# Patient Record
Sex: FEMALE | Race: BLACK OR AFRICAN AMERICAN | Employment: PART TIME | URBAN - METROPOLITAN AREA
[De-identification: names, ages, dates, MRNs, and addresses within clinical notes are randomized per-mention and may not be internally consistent; named-entity substitution may affect disease eponyms.]

---

## 2018-05-02 ENCOUNTER — HOSPITAL ENCOUNTER (EMERGENCY)
Age: 23
Discharge: HOME | End: 2018-05-02
Attending: EMERGENCY MEDICINE
Payer: COMMERCIAL

## 2018-05-02 VITALS
OXYGEN SATURATION: 100 % | RESPIRATION RATE: 16 BRPM | HEART RATE: 90 BPM | SYSTOLIC BLOOD PRESSURE: 114 MMHG | DIASTOLIC BLOOD PRESSURE: 67 MMHG | TEMPERATURE: 98 F

## 2018-05-02 DIAGNOSIS — J45.20 MILD INTERMITTENT ASTHMA WITHOUT COMPLICATION: Primary | ICD-10-CM

## 2018-05-02 LAB
APPEARANCE UR: CLEAR
BACTERIA URNS QL MICRO: ABNORMAL /HPF
BILIRUB UR QL: NEGATIVE
COLOR UR: YELLOW
EPITH CASTS URNS QL MICRO: ABNORMAL /LPF (ref 0–5)
GLUCOSE UR STRIP.AUTO-MCNC: NEGATIVE MG/DL
HGB UR QL STRIP: ABNORMAL
KETONES UR QL STRIP.AUTO: NEGATIVE MG/DL
LEUKOCYTE ESTERASE UR QL STRIP.AUTO: NEGATIVE
MUCOUS THREADS URNS QL MICRO: ABNORMAL /LPF
NITRITE UR QL STRIP.AUTO: NEGATIVE
PH UR STRIP: 6 [PH] (ref 5–8)
PROT UR STRIP-MCNC: 100 MG/DL
RBC #/AREA URNS HPF: ABNORMAL /HPF (ref 0–5)
SP GR UR REFRACTOMETRY: 1.02 (ref 1–1.03)
UROBILINOGEN UR QL STRIP.AUTO: 0.2 EU/DL (ref 0.2–1)
WBC URNS QL MICRO: ABNORMAL /HPF (ref 0–4)

## 2018-05-02 PROCEDURE — 81001 URINALYSIS AUTO W/SCOPE: CPT | Performed by: EMERGENCY MEDICINE

## 2018-05-02 PROCEDURE — 74011250637 HC RX REV CODE- 250/637: Performed by: EMERGENCY MEDICINE

## 2018-05-02 PROCEDURE — 96372 THER/PROPH/DIAG INJ SC/IM: CPT

## 2018-05-02 PROCEDURE — 87491 CHLMYD TRACH DNA AMP PROBE: CPT | Performed by: EMERGENCY MEDICINE

## 2018-05-02 PROCEDURE — 99283 EMERGENCY DEPT VISIT LOW MDM: CPT

## 2018-05-02 PROCEDURE — 74011000250 HC RX REV CODE- 250: Performed by: EMERGENCY MEDICINE

## 2018-05-02 PROCEDURE — 74011250636 HC RX REV CODE- 250/636: Performed by: EMERGENCY MEDICINE

## 2018-05-02 PROCEDURE — 74011636637 HC RX REV CODE- 636/637: Performed by: EMERGENCY MEDICINE

## 2018-05-02 RX ORDER — PREDNISONE 50 MG/1
50 TABLET ORAL DAILY
Qty: 4 TAB | Refills: 0 | Status: SHIPPED | OUTPATIENT
Start: 2018-05-02 | End: 2020-12-30

## 2018-05-02 RX ORDER — ALBUTEROL SULFATE 90 UG/1
1 AEROSOL, METERED RESPIRATORY (INHALATION)
Qty: 1 INHALER | Refills: 0 | Status: SHIPPED | OUTPATIENT
Start: 2018-05-02

## 2018-05-02 RX ORDER — AZITHROMYCIN 250 MG/1
1000 TABLET, FILM COATED ORAL
Status: COMPLETED | OUTPATIENT
Start: 2018-05-02 | End: 2018-05-02

## 2018-05-02 RX ADMIN — PREDNISONE 50 MG: 20 TABLET ORAL at 07:50

## 2018-05-02 RX ADMIN — AZITHROMYCIN 1000 MG: 250 TABLET, FILM COATED ORAL at 07:50

## 2018-05-02 RX ADMIN — LIDOCAINE HYDROCHLORIDE 250 MG: 10 INJECTION, SOLUTION EPIDURAL; INFILTRATION; INTRACAUDAL; PERINEURAL at 07:50

## 2018-05-02 NOTE — ED PROVIDER NOTES
EMERGENCY DEPARTMENT HISTORY AND PHYSICAL EXAM    7:03 AM      Date: 5/2/2018  Patient Name: Mike Li    History of Presenting Illness     Chief Complaint   Patient presents with    Other     STD check    Other     Asthma check         History Provided By: Patient    Chief Complaint: Asthma Exacerbation   Duration: 1 Days  Timing:  Acute  Location: Chest   Quality: N/A  Severity: Mild  Modifying Factors: Some relief with home inhaler  Associated Symptoms: SOB       Additional History (Context): Mike Li is a 20 y.o. female with hx of asthma presenting to the ED with c/o an acute asthma exacerbation. Pt reports she was feeling SOB since yesterday, however is improving. States she has been using her inhaler every hour with some relief. Notes something at work triggered her asthma. Pt is also requesting STD testing and would like to be treated. Denies any CP, fever, chills, abdominal pain, nausea, vomiting, diarrhea, urinary sx, vaginal discharge or vaginal bleeding. Severity is mild. No other sx or complaints given at this time. PCP: Rosa Garcia MD    Current Outpatient Prescriptions   Medication Sig Dispense Refill    albuterol (PROVENTIL HFA, VENTOLIN HFA, PROAIR HFA) 90 mcg/actuation inhaler Take 1 Puff by inhalation every four (4) hours as needed for Wheezing. 1 Inhaler 0    predniSONE (DELTASONE) 50 mg tablet Take 1 Tab by mouth daily. 4 Tab 0    ibuprofen (MOTRIN) 800 mg tablet Take 1 Tab by mouth every eight (8) hours as needed. 61 Tab 3       Past History     Past Medical History:  Past Medical History:   Diagnosis Date    Asthma     last attack years ago       Past Surgical History:  No past surgical history on file.     Family History:  Family History   Problem Relation Age of Onset    No Known Problems Mother        Social History:  Social History   Substance Use Topics    Smoking status: Never Smoker    Smokeless tobacco: Not on file    Alcohol use No Allergies: Allergies   Allergen Reactions    Percocet [Oxycodone-Acetaminophen] Swelling     Pt can not remember if her mom told her  percocet or penicillin allergy so she does not take either. patient reports tylenol makes her eyes swell    Tylenol [Acetaminophen] Anaphylaxis    Penicillins Other (comments)         Review of Systems       Review of Systems   Constitutional: Negative for fever. Respiratory: Positive for shortness of breath. All other systems reviewed and are negative. Physical Exam     Patient Vitals for the past 12 hrs:   Temp Pulse Resp BP SpO2   05/02/18 0715 98 °F (36.7 °C) 90 16 - -   05/02/18 0710 - - - 114/67 100 %           Physical Exam   Constitutional: She is oriented to person, place, and time. She appears well-developed. HENT:   Head: Normocephalic and atraumatic. Eyes: Conjunctivae and EOM are normal.   Neck: Normal range of motion. Cardiovascular: Normal heart sounds. Exam reveals no gallop and no friction rub. No murmur heard. Pulmonary/Chest: Effort normal and breath sounds normal. No stridor. Abdominal: Soft. There is no tenderness. Genitourinary:   Genitourinary Comments: Denies  exam   Musculoskeletal: Normal range of motion. She exhibits no tenderness. Neurological: She is alert and oriented to person, place, and time. Skin: Skin is warm and dry. She is not diaphoretic. Psychiatric: She has a normal mood and affect. Her behavior is normal.   Nursing note and vitals reviewed.         Diagnostic Study Results     Labs -  Recent Results (from the past 12 hour(s))   URINALYSIS W/ RFLX MICROSCOPIC    Collection Time: 05/02/18  6:45 AM   Result Value Ref Range    Color YELLOW      Appearance CLEAR      Specific gravity 1.020 1.005 - 1.030      pH (UA) 6.0 5.0 - 8.0      Protein 100 (A) NEG mg/dL    Glucose NEGATIVE  NEG mg/dL    Ketone NEGATIVE  NEG mg/dL    Bilirubin NEGATIVE  NEG      Blood SMALL (A) NEG      Urobilinogen 0.2 0.2 - 1.0 EU/dL    Nitrites NEGATIVE  NEG      Leukocyte Esterase NEGATIVE  NEG     URINE MICROSCOPIC ONLY    Collection Time: 05/02/18  6:45 AM   Result Value Ref Range    WBC 4 to 6 0 - 4 /hpf    RBC 6 to 8 0 - 5 /hpf    Epithelial cells 3+ 0 - 5 /lpf    Bacteria FEW (A) NEG /hpf    Mucus 1+ (A) NEG /lpf       Radiologic Studies -   No orders to display         Medical Decision Making     Provider Notes (Medical Decision Making): Based on my history, physical exam, and diagnostic evaluation, the patient appears to have symptoms consistent with a mild asthma exacerbation. On re-exam patient has a normal respiratory pattern and lung fields are clear on repeat auscultation. Pt appears to be in no distress, appears well-hydrated and is ambulating in the emergency department without difficulty. Subjectively the pt feels better and is requesting discharge. They will be discharged with instructions to return if difficulty breathing, not tolerating oral food or fluids, any respiratory distress, or new symptoms. I encouraged follow-up with their primary care physician for repeat exam in 24-48 hours. Asked the patient if there were any questions or concerns about the care and discharge instructions. The patient seems satisfied with the care and appears to understand the discharge instructions. I am the first provider for this patient. I reviewed the vital signs, available nursing notes, past medical history, past surgical history, family history and social history. Vital Signs-Reviewed the patient's vital signs. Pulse Oximetry Analysis -  100% on room air, stable     Records Reviewed: Nursing Notes and Old Medical Records (Time of Review: 7:03 AM)    ED Course: Progress Notes, Reevaluation, and Consults:    Diagnosis     Clinical Impression:   1.  Mild intermittent asthma without complication        Disposition: Discharge     Follow-up Information     Follow up With Details Comments Rody Resendiz MD Call in 2 days  [address] [facility] [address]  [phone] [address] [facility] EMERGENCY DEPT  As needed, If symptoms worsen [address] [facility] [address]  [phone]           Discharge Medication List as of 5/2/2018  7:42 AM      START taking these medications    Details   predniSONE (DELTASONE) 50 mg tablet Take 1 Tab by mouth daily. , Print, Disp-4 Tab, R-0         CONTINUE these medications which have CHANGED    Details   albuterol (PROVENTIL HFA, VENTOLIN HFA, PROAIR HFA) 90 mcg/actuation inhaler Take 1 Puff by inhalation every four (4) hours as needed for Wheezing., Print, Disp-1 Inhaler, R-0         CONTINUE these medications which have NOT CHANGED    Details   ibuprofen (MOTRIN) 800 mg tablet Take 1 Tab by mouth every eight (8) hours as needed., Normal, Disp-60 Tab, R-3         STOP taking these medications       HYDROmorphone (DILAUDID) 2 mg tablet Comments:   Reason for Stopping:             _______________________________    Attestations:  Scribe Attestation     Salome Chen acting as a scribe for and in the presence of Moreno Campbell MD      May 02, 2018 at 7:03 AM       Provider Attestation:      I personally performed the services described in the documentation, reviewed the documentation, as recorded by the scribe in my presence, and it accurately and completely records my words and actions.  May 02, 2018 at 7:03 AM - Moreno Campbell MD    _______________________________

## 2018-05-02 NOTE — ED TRIAGE NOTES
Pt states that she is here to be tested for STDs. She states she is having sex with 1 partner, uses condoms, and not on birth control. Pt states she wants her asthma checked as well. Pt denies asthma attack or chest tightness.

## 2018-05-02 NOTE — Clinical Note
Please follow up with a doctor as discussed. IT'S IMPORTANT TO ALWAYS HAVE YOUR INHALER WITH YOU AND USE IT IF WHEEZING. The evaluation and treatment done today requires that you follow up with a physician for re-evaluation. Medical problems can change  over time and symptoms can get worse or new symptoms can develop over time, therefore, it is important that you follow up as we discussed. Please immediately return to the ER if you have any concerns. Call the ER if you have any questions about what w e discussed. At the [facility] Emergency Department we are genuinely concerned about your health and comfort. You may be selected to participate in a patient satisfaction survey mailed to your home. We are excited about the opportunity t o learn from your experience so we may continue to improve. In striving for the very best we believe good is not good enough, but if you rate us as EXCELLENT in all boxes, we have succeeded. We strive to provide EXCELLENT care to you and your family. We  appreciate the opportunity to take care of you, and hope you do well.

## 2018-05-02 NOTE — LETTER
NOTIFICATION RETURN TO WORK / SCHOOL 
 
5/2/2018 7:30 AM 
 
Ms. Anaeblla Pearson [id number] [facility] [address] To Whom It May Concern: 
 
Anabella Pearson is currently under the care of [facility] EMERGENCY DEPT. She will return to work/school on: 05/03/2018 (or sooner if feeling better) If there are questions or concerns please have the patient contact our office. Sincerely, Nubia Samuel MD

## 2018-05-04 LAB
C TRACH RRNA SPEC QL NAA+PROBE: NEGATIVE
N GONORRHOEA RRNA SPEC QL NAA+PROBE: NEGATIVE
SPECIMEN SOURCE: NORMAL

## 2019-02-11 ENCOUNTER — HOSPITAL ENCOUNTER (EMERGENCY)
Age: 24
Discharge: HOME | End: 2019-02-11
Attending: EMERGENCY MEDICINE
Payer: COMMERCIAL

## 2019-02-11 VITALS
BODY MASS INDEX: 49.28 KG/M2 | WEIGHT: 251 LBS | RESPIRATION RATE: 16 BRPM | TEMPERATURE: 98.3 F | SYSTOLIC BLOOD PRESSURE: 137 MMHG | HEART RATE: 100 BPM | DIASTOLIC BLOOD PRESSURE: 96 MMHG | OXYGEN SATURATION: 97 % | HEIGHT: 60 IN

## 2019-02-11 DIAGNOSIS — J06.9 ACUTE UPPER RESPIRATORY INFECTION: Primary | ICD-10-CM

## 2019-02-11 DIAGNOSIS — R03.0 ELEVATED BLOOD PRESSURE READING: ICD-10-CM

## 2019-02-11 PROCEDURE — 74011250637 HC RX REV CODE- 250/637: Performed by: EMERGENCY MEDICINE

## 2019-02-11 PROCEDURE — 99283 EMERGENCY DEPT VISIT LOW MDM: CPT

## 2019-02-11 RX ORDER — DEXAMETHASONE SODIUM PHOSPHATE 4 MG/ML
10 INJECTION, SOLUTION INTRA-ARTICULAR; INTRALESIONAL; INTRAMUSCULAR; INTRAVENOUS; SOFT TISSUE
Status: COMPLETED | OUTPATIENT
Start: 2019-02-11 | End: 2019-02-11

## 2019-02-11 RX ADMIN — DEXAMETHASONE SODIUM PHOSPHATE 10 MG: 4 INJECTION, SOLUTION INTRAMUSCULAR; INTRAVENOUS at 20:30

## 2019-02-12 NOTE — DISCHARGE INSTRUCTIONS
Patient Education        Elevated Blood Pressure: Care Instructions  Your Care Instructions    Blood pressure is a measure of how hard the blood pushes against the walls of your arteries. It's normal for blood pressure to go up and down throughout the day. But if it stays up over time, you have high blood pressure. Two numbers tell you your blood pressure. The first number is the systolic pressure. It shows how hard the blood pushes when your heart is pumping. The second number is the diastolic pressure. It shows how hard the blood pushes between heartbeats, when your heart is relaxed and filling with blood. An ideal blood pressure in adults is less than 120/80 (say \"120 over 80\"). High blood pressure is 140/90 or higher. You have high blood pressure if your top number is 140 or higher or your bottom number is 90 or higher, or both. The main test for high blood pressure is simple, fast, and painless. To diagnose high blood pressure, your doctor will test your blood pressure at different times. After testing your blood pressure, your doctor may ask you to test it again when you are home. If you are diagnosed with high blood pressure, you can work with your doctor to make a long-term plan to manage it. Follow-up care is a key part of your treatment and safety. Be sure to make and go to all appointments, and call your doctor if you are having problems. It's also a good idea to know your test results and keep a list of the medicines you take. How can you care for yourself at home? · Do not smoke. Smoking increases your risk for heart attack and stroke. If you need help quitting, talk to your doctor about stop-smoking programs and medicines. These can increase your chances of quitting for good. · Stay at a healthy weight. · Try to limit how much sodium you eat to less than 2,300 milligrams (mg) a day. Your doctor may ask you to try to eat less than 1,500 mg a day. · Be physically active.  Get at least 30 minutes of exercise on most days of the week. Walking is a good choice. You also may want to do other activities, such as running, swimming, cycling, or playing tennis or team sports. · Avoid or limit alcohol. Talk to your doctor about whether you can drink any alcohol. · Eat plenty of fruits, vegetables, and low-fat dairy products. Eat less saturated and total fats. · Learn how to check your blood pressure at home. When should you call for help? Call your doctor now or seek immediate medical care if:  ? · Your blood pressure is much higher than normal (such as 180/110 or higher). ? · You think high blood pressure is causing symptoms such as:  ¨ Severe headache. ¨ Blurry vision. ? Watch closely for changes in your health, and be sure to contact your doctor if:  ? · You do not get better as expected. Where can you learn more? Go to http://diaThe Clearingrony.info/. Enter E958 in the search box to learn more about \"Elevated Blood Pressure: Care Instructions. \"  Current as of: September 21, 2016  Content Version: 11.4  © 4435-6747 [employer]. Care instructions adapted under license by [employer] (which disclaims liability or warranty for this information). If you have questions about a medical condition or this instruction, always ask your healthcare professional. [place] [address] any warranty or liability for your use of this information. Patient Education        Upper Respiratory Infection (Cold): Care Instructions  Your Care Instructions    An upper respiratory infection, or URI, is an infection of the nose, sinuses, or throat. URIs are spread by coughs, sneezes, and direct contact. The common cold is the most frequent kind of URI. The flu and sinus infections are other kinds of URIs. Almost all URIs are caused by viruses. Antibiotics won't cure them. But you can treat most infections with home care.  This may include drinking lots of fluids and taking over-the-counter pain medicine. You will probably feel better in 4 to 10 days. The doctor has checked you carefully, but problems can develop later. If you notice any problems or new symptoms, get medical treatment right away. Follow-up care is a key part of your treatment and safety. Be sure to make and go to all appointments, and call your doctor if you are having problems. It's also a good idea to know your test results and keep a list of the medicines you take. How can you care for yourself at home? · To prevent dehydration, drink plenty of fluids, enough so that your urine is light yellow or clear like water. Choose water and other caffeine-free clear liquids until you feel better. If you have kidney, heart, or liver disease and have to limit fluids, talk with your doctor before you increase the amount of fluids you drink. · Take an over-the-counter pain medicine, such as acetaminophen (Tylenol), ibuprofen (Advil, Motrin), or naproxen (Aleve). Read and follow all instructions on the label. · Before you use cough and cold medicines, check the label. These medicines may not be safe for young children or for people with certain health problems. · Be careful when taking over-the-counter cold or flu medicines and Tylenol at the same time. Many of these medicines have acetaminophen, which is Tylenol. Read the labels to make sure that you are not taking more than the recommended dose. Too much acetaminophen (Tylenol) can be harmful. · Get plenty of rest.  · Do not smoke or allow others to smoke around you. If you need help quitting, talk to your doctor about stop-smoking programs and medicines. These can increase your chances of quitting for good. When should you call for help? Call 911 anytime you think you may need emergency care.  For example, call if:    · You have severe trouble breathing.    Call your doctor now or seek immediate medical care if:    · You seem to be getting much sicker.     · You have new or worse trouble breathing.     · You have a new or higher fever.     · You have a new rash.    Watch closely for changes in your health, and be sure to contact your doctor if:    · You have a new symptom, such as a sore throat, an earache, or sinus pain.     · You cough more deeply or more often, especially if you notice more mucus or a change in the color of your mucus.     · You do not get better as expected. Where can you learn more? Go to http://dia-rony.info/. Enter W061 in the search box to learn more about \"Upper Respiratory Infection (Cold): Care Instructions. \"  Current as of: September 5, 2018  Content Version: 11.9  © 0988-2426 [employer]. Care instructions adapted under license by [employer] (which disclaims liability or warranty for this information). If you have questions about a medical condition or this instruction, always ask your healthcare professional. [place] [address] any warranty or liability for your use of this information.

## 2019-02-12 NOTE — ED PROVIDER NOTES
EMERGENCY DEPARTMENT HISTORY AND PHYSICAL EXAM 
 
8:17 PM 
 
 
Date: 2/11/2019 Patient Name: Chrissy Polanco History of Presenting Illness Chief Complaint Patient presents with  Dizziness History Provided By: Patient Additional History (Context): Chrissy Polanco is a 24 y.o. female with PMHx of asthma who presents with c/o constant lightheadedness that started today with associated Sx of sore throat and cough for 1 day. Pt states she has a cold but denies swollen tonsils. Pt's LMP was 5 days ago. Pt denies hx of HTN. Pt denies fever. Denies any further complaints or symptoms at the moment. PCP: None Chief Complaint: Lightheadedness Duration:  Today Timing:  Acute Location: NA 
Quality: NA Severity: not reported Modifying Factors: No modifying or aggravating factors were reported. Associated Symptoms: sore throat and cough Current Facility-Administered Medications Medication Dose Route Frequency Provider Last Rate Last Dose  dexamethasone (DECADRON) 4 mg/mL injection 10 mg  10 mg Oral NOW Lizz White PA Current Outpatient Medications Medication Sig Dispense Refill  Camphor-Eucalyptus Oil-Menthol (VICKS VAPORUB) 4.8-1.2-2.6 % oint 1 Actuation(s) by Apply Externally route three (3) times daily as needed. 50 g 0  
 dextromethorphan-guaiFENesin (ROBITUSSIN-DM)  mg/5 mL syrup Take 10 mL by mouth every six (6) hours as needed for Cough. 240 mL 0  
 dextromethorphan-guaiFENesin (CORICIDIN HBP)  mg cap Take 1 Cap by mouth two (2) times daily as needed. 20 Cap 0  
 albuterol (PROVENTIL HFA, VENTOLIN HFA, PROAIR HFA) 90 mcg/actuation inhaler Take 1 Puff by inhalation every four (4) hours as needed for Wheezing. 1 Inhaler 0  predniSONE (DELTASONE) 50 mg tablet Take 1 Tab by mouth daily. 4 Tab 0  ibuprofen (MOTRIN) 800 mg tablet Take 1 Tab by mouth every eight (8) hours as needed. 60 Tab 3 Past History Past Medical History: Past Medical History:  
Diagnosis Date  Asthma   
 last attack years ago Past Surgical History: 
History reviewed. No pertinent surgical history. Family History: 
Family History Problem Relation Age of Onset  No Known Problems Mother Social History: 
Social History Tobacco Use  Smoking status: Never Smoker  Smokeless tobacco: Never Used Substance Use Topics  Alcohol use: No  
 Drug use: Yes Allergies: Allergies Allergen Reactions  Percocet [Oxycodone-Acetaminophen] Swelling Pt can not remember if her mom told her  percocet or penicillin allergy so she does not take either. patient reports tylenol makes her eyes swell  Tylenol [Acetaminophen] Anaphylaxis  Penicillins Other (comments) Review of Systems Review of Systems Constitutional: Negative for activity change, fatigue and fever. HENT: Positive for sore throat. Negative for congestion and rhinorrhea. Eyes: Negative for visual disturbance. Respiratory: Positive for cough. Negative for shortness of breath. Cardiovascular: Negative for chest pain and palpitations. Gastrointestinal: Negative for abdominal pain, diarrhea, nausea and vomiting. Genitourinary: Negative for dysuria and hematuria. Musculoskeletal: Negative for back pain. Skin: Negative for rash. Neurological: Positive for light-headedness. Negative for dizziness and weakness. All other systems reviewed and are negative. Physical Exam  
 
Visit Vitals BP (!) 137/96 (BP 1 Location: Left arm, BP Patient Position: At rest) Pulse 100 Temp 98.3 °F (36.8 °C) Resp 16 Ht 5' (1.524 m) Wt 113.9 kg (251 lb) SpO2 97% BMI 49.02 kg/m² Physical Exam  
Constitutional: She is oriented to person, place, and time. She appears well-developed. HENT:  
Head: Normocephalic and atraumatic. Mouth/Throat: No oropharyngeal exudate. Eyes: Pupils are equal, round, and reactive to light. Neck: No JVD present. No tracheal deviation present. No thyromegaly present. Cardiovascular: Normal rate, regular rhythm and normal heart sounds. Exam reveals no gallop and no friction rub. No murmur heard. Pulmonary/Chest: Effort normal and breath sounds normal. No stridor. No respiratory distress. She has no wheezes. She has no rales. She exhibits no tenderness. Abdominal: Soft. She exhibits no distension and no mass. There is no tenderness. There is no rebound and no guarding. Musculoskeletal: She exhibits no edema or tenderness. Lymphadenopathy:  
  She has no cervical adenopathy. Neurological: She is alert and oriented to person, place, and time. Skin: Skin is warm and dry. No rash noted. No erythema. No pallor. Psychiatric: She has a normal mood and affect. Her behavior is normal. Thought content normal.  
Nursing note and vitals reviewed. Diagnostic Study Results Labs - No results found for this or any previous visit (from the past 12 hour(s)). Radiologic Studies - No orders to display Medical Decision Making It should be noted that [place] PA-C will be the provider of record for this patient. I reviewed the vital signs, available nursing notes, past medical history, past surgical history, family history and social history. Vital Signs-Reviewed the patient's vital signs. Pulse Oximetry Analysis -  97% on room air (Interpretation) normal  
 
Records Reviewed: Nursing Notes and Old Medical Records (Time of Review: 8:17 PM) ED Course: Progress Notes, Reevaluation, and Consults: 
 
Provider Notes (Medical Decision Making): treat URI symptoms of cough, congestion x 1d. Afebrile. Diagnosis Clinical Impression: 1. Acute upper respiratory infection 2. Elevated blood pressure reading Disposition: Discharged Follow-up Information Follow up With Specialties Details Why Contact Info [facility]  Schedule an appointment as soon as possible for a visit in 1 day  JOHN Sykes [address] [facility] EMERGENCY DEPT Emergency Medicine  If symptoms worsen return immediately [address] Medication List  
  
START taking these medications Camphor-Eucalyptus Oil-Menthol 4.8-1.2-2.6 % Oint Commonly known as:  VICKS VAPORUB 
1 Actuation(s) by Apply Externally route three (3) times daily as needed. * dextromethorphan-guaiFENesin  mg/5 mL syrup Commonly known as:  ROBITUSSIN-DM Take 10 mL by mouth every six (6) hours as needed for Cough. * dextromethorphan-guaiFENesin  mg Cap Commonly known as:  CORICIDIN HBP Take 1 Cap by mouth two (2) times daily as needed. * This list has 2 medication(s) that are the same as other medications prescribed for you. Read the directions carefully, and ask your doctor or other care provider to review them with you. ASK your doctor about these medications   
albuterol 90 mcg/actuation inhaler Commonly known as:  PROVENTIL HFA, VENTOLIN HFA, PROAIR HFA Take 1 Puff by inhalation every four (4) hours as needed for Wheezing. ibuprofen 800 mg tablet Commonly known as:  MOTRIN Take 1 Tab by mouth every eight (8) hours as needed. predniSONE 50 mg tablet Commonly known as:  Leverne Donna Take 1 Tab by mouth daily. Where to Get Your Medications Information about where to get these medications is not yet available Ask your nurse or doctor about these medications · Camphor-Eucalyptus Oil-Menthol 4.8-1.2-2.6 % Oint · dextromethorphan-guaiFENesin  mg Cap · dextromethorphan-guaiFENesin  mg/5 mL syrup 
  
 
_______________________________ Scribe Attestation Evelyn Renteria acting as a scribe for and in the presence of Roldan Serrato February 11, 2019 at 8:21 PM 
    
Provider Attestation: I personally performed the services described in the documentation, reviewed the documentation, as recorded by the scribe in my presence, and it accurately and completely records my words and actions. February 11, 2019 at 8:21 PM - Sergio Olmstead PA-C 
 
 
_______________________________

## 2019-04-26 ENCOUNTER — APPOINTMENT (OUTPATIENT)
Dept: GENERAL RADIOLOGY | Age: 24
End: 2019-04-26
Attending: PHYSICIAN ASSISTANT
Payer: COMMERCIAL

## 2019-04-26 ENCOUNTER — HOSPITAL ENCOUNTER (EMERGENCY)
Age: 24
Discharge: HOME | End: 2019-04-26
Attending: EMERGENCY MEDICINE
Payer: COMMERCIAL

## 2019-04-26 VITALS
OXYGEN SATURATION: 100 % | TEMPERATURE: 97.6 F | RESPIRATION RATE: 18 BRPM | HEART RATE: 86 BPM | DIASTOLIC BLOOD PRESSURE: 68 MMHG | SYSTOLIC BLOOD PRESSURE: 126 MMHG

## 2019-04-26 DIAGNOSIS — J45.909 UNCOMPLICATED ASTHMA, UNSPECIFIED ASTHMA SEVERITY, UNSPECIFIED WHETHER PERSISTENT: ICD-10-CM

## 2019-04-26 DIAGNOSIS — Z76.0 MEDICATION REFILL: ICD-10-CM

## 2019-04-26 DIAGNOSIS — S83.91XA SPRAIN OF RIGHT KNEE, UNSPECIFIED LIGAMENT, INITIAL ENCOUNTER: Primary | ICD-10-CM

## 2019-04-26 PROCEDURE — 94640 AIRWAY INHALATION TREATMENT: CPT

## 2019-04-26 PROCEDURE — 99282 EMERGENCY DEPT VISIT SF MDM: CPT

## 2019-04-26 PROCEDURE — 77030029684 HC NEB SM VOL KT MONA -A

## 2019-04-26 PROCEDURE — 74011000250 HC RX REV CODE- 250: Performed by: PHYSICIAN ASSISTANT

## 2019-04-26 PROCEDURE — 73562 X-RAY EXAM OF KNEE 3: CPT

## 2019-04-26 RX ORDER — IPRATROPIUM BROMIDE AND ALBUTEROL SULFATE 2.5; .5 MG/3ML; MG/3ML
3 SOLUTION RESPIRATORY (INHALATION) ONCE
Status: COMPLETED | OUTPATIENT
Start: 2019-04-26 | End: 2019-04-26

## 2019-04-26 RX ORDER — NAPROXEN 500 MG/1
500 TABLET ORAL 2 TIMES DAILY WITH MEALS
Qty: 20 TAB | Refills: 0 | Status: SHIPPED | OUTPATIENT
Start: 2019-04-26 | End: 2019-04-26

## 2019-04-26 RX ORDER — NAPROXEN 500 MG/1
500 TABLET ORAL 2 TIMES DAILY WITH MEALS
Qty: 20 TAB | Refills: 0 | Status: SHIPPED | OUTPATIENT
Start: 2019-04-26 | End: 2019-05-06

## 2019-04-26 RX ORDER — ALBUTEROL SULFATE 90 UG/1
2 AEROSOL, METERED RESPIRATORY (INHALATION)
Qty: 1 INHALER | Refills: 1 | Status: SHIPPED | OUTPATIENT
Start: 2019-04-26 | End: 2020-12-30

## 2019-04-26 RX ADMIN — IPRATROPIUM BROMIDE AND ALBUTEROL SULFATE 3 ML: .5; 3 SOLUTION RESPIRATORY (INHALATION) at 14:33

## 2019-04-26 NOTE — LETTER
NOTIFICATION RETURN TO WORK / SCHOOL 
 
4/26/2019 3:29 PM 
 
Ms. Jordy Hodges [address] [place] [id number] To Whom It May Concern: 
 
Jordy Hodges is currently under the care of [facility] EMERGENCY DE[facility]. She will return to work/school on: April 30, 2019 If there are questions or concerns please have the patient contact our office. Sincerely, Calvin Pina PA-C

## 2019-04-26 NOTE — DISCHARGE INSTRUCTIONS
Patient Education        Asthma Attack: Care Instructions  Your Care Instructions    During an asthma attack, the airways swell and narrow. This makes it hard to breathe. Severe asthma attacks can be life-threatening, but you can help prevent them by keeping your asthma under control and treating symptoms before they get bad. Symptoms include being short of breath, having chest tightness, coughing, and wheezing. Noting and treating these symptoms can also help you avoid future trips to the emergency room. The doctor has checked you carefully, but problems can develop later. If you notice any problems or new symptoms, get medical treatment right away. Follow-up care is a key part of your treatment and safety. Be sure to make and go to all appointments, and call your doctor if you are having problems. It's also a good idea to know your test results and keep a list of the medicines you take. How can you care for yourself at home? · Follow your asthma action plan to prevent and treat attacks. If you don't have an asthma action plan, work with your doctor to create one. · Take your asthma medicines exactly as prescribed. Talk to your doctor right away if you have any questions about how to take them. ? Use your quick-relief medicine when you have symptoms of an attack. Quick-relief medicine is usually an albuterol inhaler. Some people need to use quick-relief medicine before they exercise. ? Take your controller medicine every day, not just when you have symptoms. Controller medicine is usually an inhaled corticosteroid. The goal is to prevent problems before they occur. Don't use your controller medicine to treat an attack that has already started. It doesn't work fast enough to help. ? If your doctor prescribed corticosteroid pills to use during an attack, take them exactly as prescribed. It may take hours for the pills to work, but they may make the episode shorter and help you breathe better. ?  Keep your quick-relief medicine with you at all times. · Talk to your doctor before using other medicines. Some medicines, such as aspirin, can cause asthma attacks in some people. · If you have a peak flow meter, use it to check how well you are breathing. This can help you predict when an asthma attack is going to occur. Then you can take medicine to prevent the asthma attack or make it less severe. · Do not smoke or allow others to smoke around you. Avoid smoky places. Smoking makes asthma worse. If you need help quitting, talk to your doctor about stop-smoking programs and medicines. These can increase your chances of quitting for good. · Learn what triggers an asthma attack for you, and avoid the triggers when you can. Common triggers include colds, smoke, air pollution, dust, pollen, mold, pets, cockroaches, stress, and cold air. · Avoid colds and the flu. Get a pneumococcal vaccine shot. If you have had one before, ask your doctor if you need a second dose. Get a flu vaccine every fall. If you must be around people with colds or the flu, wash your hands often. When should you call for help? Call 911 anytime you think you may need emergency care. For example, call if:    · You have severe trouble breathing.    Call your doctor now or seek immediate medical care if:    · Your symptoms do not get better after you have followed your asthma action plan.     · You have new or worse trouble breathing.     · Your coughing and wheezing get worse.     · You cough up dark brown or bloody mucus (sputum).     · You have a new or higher fever.    Watch closely for changes in your health, and be sure to contact your doctor if:    · You need to use quick-relief medicine on more than 2 days a week (unless it is just for exercise).     · You cough more deeply or more often, especially if you notice more mucus or a change in the color of your mucus.     · You are not getting better as expected. Where can you learn more?   Go to http://dia-rony.info/. Enter O953 in the search box to learn more about \"Asthma Attack: Care Instructions. \"  Current as of: September 5, 2018  Content Version: 11.9  © 3050-2136 [employer]. Care instructions adapted under license by [employer] (which disclaims liability or warranty for this information). If you have questions about a medical condition or this instruction, always ask your healthcare professional. [place] [address] any warranty or liability for your use of this information. Patient Education     Learning About Asthma Triggers  What are triggers? When you have asthma, certain things can make your symptoms worse. These are called triggers. They include:  · Cigarette smoke or air pollution. · Things you are allergic to, such as:  ¨ Pollen, mold, or dust mites. ¨ Pet hair, skin, or saliva. · Illnesses, like colds, flu, or pneumonia. · Exercise. · Dry, cold air. How do triggers affect asthma? Triggers can make it harder for your lungs to work as they should and can lead to sudden difficulty breathing and other symptoms. When you are around a trigger, an asthma attack is more likely. If your symptoms are severe, you may need emergency treatment or have to go to the hospital for treatment. If you know what your triggers are and can avoid them, you may be able to prevent asthma attacks, reduce how often you have them, and make them less severe. What can you do to avoid triggers? The first thing is to know your triggers. When you are having symptoms, note the things around you that might be causing them. Then look for patterns in what may be triggering your symptoms. Record your triggers on a piece of paper or in an asthma diary. When you have your list of possible triggers, work with your doctor to find ways to avoid them.   You also can check how well your lungs are working by measuring your peak expiratory flow (PEF) throughout the day. Your PEF may drop when you are near things that trigger symptoms. Here are some ways to avoid a few common triggers. · Do not smoke or allow others to smoke around you. If you need help quitting, talk to your doctor about stop-smoking programs and medicines. These can increase your chances of quitting for good. · If there is a lot of pollution, pollen, or dust outside, stay at home and keep your windows closed. Use an air conditioner or air filter in your home. Check your local weather report or newspaper for air quality and pollen reports. · Get a flu shot every year. Talk to your doctor about getting a pneumococcal shot. Wash your hands often to prevent infections. · Avoid exercising outdoors in cold weather. If you are outdoors in cold weather, wear a scarf around your face and breathe through your nose. How can you manage an asthma attack? · If you have an asthma action plan, follow the plan. In general:  ¨ Use your quick-relief inhaler as directed by your doctor. If your symptoms do not get better after you use your medicine, have someone take you to the emergency room. Call an ambulance if needed. ¨ If your doctor has given you other inhaled medicines or steroid pills, take them as directed. Where can you learn more? Go to Care2Manage.be  Enter M564 in the search box to learn more about \"Learning About Asthma Triggers. \"   © 9423-1314 [employer]. Care instructions adapted under license by [employer] (which disclaims liability or warranty for this information). This care instruction is for use with your licensed healthcare professional. If you have questions about a medical condition or this instruction, always ask your healthcare professional. [place] [address] any warranty or liability for your use of this information. Content Version: 07.8.316943;  Last Revised: February 23, 2012                 Patient Education        Knee Sprain: Care Instructions  Your Care Instructions    A knee sprain is one or more stretched, partly torn, or completely torn knee ligaments. Ligaments are bands of ropelike tissue that connect bone to bone and make the knee stable. The knee has four main ligaments. Knee sprains often happen because of a twisting or bending injury from sports such as skiing, basketball, soccer, or football. The knee turns one way while the lower or upper leg goes another way. A sprain also can happen when the knee is hit from the side or the front. If a knee ligament is slightly stretched, you will probably need only home treatment. You may need a splint or brace (immobilizer) for a partly torn ligament. A complete tear may need surgery. A minor knee sprain may take up to 6 weeks to heal, while a severe sprain may take months. Follow-up care is a key part of your treatment and safety. Be sure to make and go to all appointments, and call your doctor if you are having problems. It's also a good idea to know your test results and keep a list of the medicines you take. How can you care for yourself at home? · Follow instructions about how much weight you can put on your leg and how to walk with crutches. · Prop up your leg on a pillow when you ice it or anytime you sit or lie down for the next 3 days. Try to keep it above the level of your heart. This will help reduce swelling. · Put ice or a cold pack on your knee for 10 to 20 minutes at a time. Try to do this every 1 to 2 hours for the next 3 days (when you are awake) or until the swelling goes down. Put a thin cloth between the ice and your skin. Do not get the splint wet. · If you have an elastic bandage, make sure it is snug but not so tight that your leg is numb, tingles, or swells below the bandage. You can loosen the bandage if it is too tight. · Your doctor may recommend a brace (immobilizer) to support your knee while it heals. Wear it as directed.   · Ask your doctor if you can take an over-the-counter pain medicine, such as acetaminophen (Tylenol), ibuprofen (Advil, Motrin), or naproxen (Aleve). Be safe with medicines. Read and follow all instructions on the label. When should you call for help? Call 911 anytime you think you may need emergency care. For example, call if:    · You have sudden chest pain and shortness of breath, or you cough up blood.    Call your doctor now or seek immediate medical care if:    · You have increased or severe pain.     · You cannot move your toes or ankle.     · Your foot is cool or pale or changes color.     · You have tingling, weakness, or numbness in your foot or leg.     · Your splint or brace feels too tight.     · You are unable to straighten the knee, or the knee \"locks. \"     · You have signs of a blood clot in your leg, such as:  ? Pain in your calf, back of the knee, thigh, or groin. ? Redness and swelling in your leg.    Watch closely for changes in your health, and be sure to contact your doctor if:    · Your pain is not getting better or is getting worse. Where can you learn more? Go to http://dia-rony.info/. Enter N406 in the search box to learn more about \"Knee Sprain: Care Instructions. \"  Current as of: September 20, 2018  Content Version: 11.9  © 2277-7178 [employer]. Care instructions adapted under license by [employer] (which disclaims liability or warranty for this information). If you have questions about a medical condition or this instruction, always ask your healthcare professional. [place] [address] any warranty or liability for your use of this information.

## 2019-04-26 NOTE — ED PROVIDER NOTES
EMERGENCY DEPARTMENT HISTORY AND PHYSICAL EXAM 
 
Date: 4/26/2019 Patient Name: Michelle Gaitan History of Presenting Illness Chief Complaint Patient presents with  Knee Pain History Provided By: patient Additional History (Context): Michelle Gaitan is a24year-old female presenting with right knee pain as well as request for inhaler refill. Patient states she has asthma and for the past 3 to 4 days is felt slightly more tight. Denies coughing/wheezing/chest pain or shortness of breath. No fever chills or cough. States she ran out of her inhaler recently. Also states she was walking out of work trailer and twisted her right knee. Did not fall to the ground but has had pain in this knee since. This happened earlier this morning patient has been able to ambulate no complaints as far as no ankle pain, calf pain hip pain or any other pain at this time. No numbness or tingling. PCP: None Current Outpatient Medications Medication Sig Dispense Refill  Camphor-Eucalyptus Oil-Menthol (VICKS VAPORUB) 4.8-1.2-2.6 % oint 1 Actuation(s) by Apply Externally route three (3) times daily as needed. 50 g 0  
 dextromethorphan-guaiFENesin (ROBITUSSIN-DM)  mg/5 mL syrup Take 10 mL by mouth every six (6) hours as needed for Cough. 240 mL 0  
 dextromethorphan-guaiFENesin (CORICIDIN HBP)  mg cap Take 1 Cap by mouth two (2) times daily as needed. 20 Cap 0  
 albuterol (PROVENTIL HFA, VENTOLIN HFA, PROAIR HFA) 90 mcg/actuation inhaler Take 1 Puff by inhalation every four (4) hours as needed for Wheezing. 1 Inhaler 0  predniSONE (DELTASONE) 50 mg tablet Take 1 Tab by mouth daily. 4 Tab 0  ibuprofen (MOTRIN) 800 mg tablet Take 1 Tab by mouth every eight (8) hours as needed. 60 Tab 3 Past History Past Medical History: 
Past Medical History:  
Diagnosis Date  Asthma   
 last attack years ago Past Surgical History: No past surgical history on file. Family History: 
Family History Problem Relation Age of Onset  No Known Problems Mother Social History: 
Social History Tobacco Use  Smoking status: Never Smoker  Smokeless tobacco: Never Used Substance Use Topics  Alcohol use: No  
 Drug use: Yes Allergies: Allergies Allergen Reactions  Percocet [Oxycodone-Acetaminophen] Swelling Pt can not remember if her mom told her  percocet or penicillin allergy so she does not take either. patient reports tylenol makes her eyes swell  Tylenol [Acetaminophen] Anaphylaxis  Penicillins Other (comments) Review of Systems Review of Systems Constitutional: Negative for chills and fever. HENT: Negative for nasal congestion, sore throat, rhinorrhea Eyes: Negative. Respiratory: pos cough and negative for shortness of breath. Cardiovascular: Negative for chest pain and palpitations. Gastrointestinal: Negative for abdominal pain, constipation, diarrhea, nausea and vomiting. Genitourinary: Negative. Negative for difficulty urinating and flank pain. Musculoskeletal: Negative for back pain. Negative for gait problem and neck pain. Positive for right knee pain Skin: Negative. Allergic/Immunologic: Negative. Neurological: Negative for dizziness, weakness, numbness and headaches. Psychiatric/Behavioral: Negative. All other systems reviewed and are negative. All Other Systems Negative Physical Exam  
 
Vitals:  
 04/26/19 1422 BP: 126/68 Pulse: 86 Resp: 18 Temp: 97.6 °F (36.4 °C) SpO2: 100% Physical Exam  
Constitutional: She is oriented to person, place, and time. She appears well-developed and well-nourished. No distress. HENT:  
Head: Normocephalic and atraumatic. Nose: Nose normal.  
Eyes: Pupils are equal, round, and reactive to light. Conjunctivae and EOM are normal.  
Neck: Normal range of motion. Neck supple. Cardiovascular: Normal rate and regular rhythm. Pulmonary/Chest: Effort normal and breath sounds normal. No respiratory distress. She has no wheezes. She has no rales. She exhibits no tenderness. No cough, lungs clear to auscultation, no wheeze or distress. Abdominal: Soft. Musculoskeletal: Normal range of motion. Right knee: She exhibits normal range of motion, no swelling, no effusion, no ecchymosis, no deformity, no laceration, no erythema, normal alignment, no LCL laxity and no bony tenderness. Right ankle: Normal.  
     Right lower leg: Normal.  
     Legs: 
     Right foot: Normal.  
Neurological: She is alert and oriented to person, place, and time. No cranial nerve deficit. Coordination normal.  
Skin: Skin is warm. No rash noted. She is not diaphoretic. Psychiatric: She has a normal mood and affect. Her behavior is normal.  
Nursing note and vitals reviewed. Diagnostic Study Results Labs - No results found for this or any previous visit (from the past 12 hour(s)). Radiologic Studies -  
XR KNEE RT 3 V    (Results Pending) CT Results  (Last 48 hours) None CXR Results  (Last 48 hours) None Medical Decision Making I am the first provider for this patient. I reviewed the vital signs, available nursing notes, past medical history, past surgical history, family history and social history. Vital Signs-Reviewed the patient's vital signs. Records Reviewed: Nursing notes, old medical records and any previous labs, imaging, visits, consultations pertinent to patient care Procedures: 
Procedures Provider Notes (Medical Decision Making):  
Patient with history of asthma here with request for inhaler. Also requesting a DuoNeb treatment for supportive treatment. No wheezing, no distress will give treatment for therapeutic reasons but no indication for labs or imaging. Will refill inhaler. Xray negative for acute process. Appropriate for out pt management.  Will discuss supportive treatment, NSAIDS, RICE and ortho f/u. Discussed proper way to take medications. Discussed treatment plan, return precautions, symptomatic relief, and expected time to improvement. All questions answered. Patient is stable for discharge and outpatient management. MED RECONCILIATION: 
No current facility-administered medications for this encounter. Current Outpatient Medications Medication Sig  Camphor-Eucalyptus Oil-Menthol (VICKS VAPORUB) 4.8-1.2-2.6 % oint 1 Actuation(s) by Apply Externally route three (3) times daily as needed.  dextromethorphan-guaiFENesin (ROBITUSSIN-DM)  mg/5 mL syrup Take 10 mL by mouth every six (6) hours as needed for Cough.  dextromethorphan-guaiFENesin (CORICIDIN HBP)  mg cap Take 1 Cap by mouth two (2) times daily as needed.  albuterol (PROVENTIL HFA, VENTOLIN HFA, PROAIR HFA) 90 mcg/actuation inhaler Take 1 Puff by inhalation every four (4) hours as needed for Wheezing.  predniSONE (DELTASONE) 50 mg tablet Take 1 Tab by mouth daily.  ibuprofen (MOTRIN) 800 mg tablet Take 1 Tab by mouth every eight (8) hours as needed. Disposition: 
home DISCHARGE NOTE:  
 
Pt has been reexamined. Patient has no new complaints, changes, or physical findings. Care plan outlined and precautions discussed. Discussed proper way to take medications. Discussed treatment plan, return precautions, symptomatic relief, and expected time to improvement. All questions answered. Patient is stable for discharge and outpatient management. Patient is ready to go home. Follow-up Information None Current Discharge Medication List  
  
 
 
 
 
 
Diagnosis Clinical Impression: No diagnosis found.

## 2019-04-26 NOTE — ED TRIAGE NOTES
Pt was walking out of work and felt knee cap popped out. Has a hx of this happening and states she feels like it went back in. C/o of pain and tenderness to right knee. Also states \"her asthma is acting up\" and wants a breathing treatment.

## 2019-05-21 ENCOUNTER — APPOINTMENT (OUTPATIENT)
Dept: CT IMAGING | Age: 24
End: 2019-05-21
Attending: PHYSICIAN ASSISTANT
Payer: COMMERCIAL

## 2019-05-21 ENCOUNTER — HOSPITAL ENCOUNTER (EMERGENCY)
Age: 24
Discharge: HOME | End: 2019-05-21
Attending: EMERGENCY MEDICINE
Payer: COMMERCIAL

## 2019-05-21 VITALS
SYSTOLIC BLOOD PRESSURE: 142 MMHG | RESPIRATION RATE: 14 BRPM | WEIGHT: 262 LBS | BODY MASS INDEX: 51.44 KG/M2 | HEART RATE: 90 BPM | TEMPERATURE: 97.4 F | HEIGHT: 60 IN | OXYGEN SATURATION: 100 % | DIASTOLIC BLOOD PRESSURE: 87 MMHG

## 2019-05-21 DIAGNOSIS — B96.89 BV (BACTERIAL VAGINOSIS): Primary | ICD-10-CM

## 2019-05-21 DIAGNOSIS — N76.0 BV (BACTERIAL VAGINOSIS): Primary | ICD-10-CM

## 2019-05-21 DIAGNOSIS — N39.0 URINARY TRACT INFECTION, ACUTE: ICD-10-CM

## 2019-05-21 LAB
APPEARANCE UR: CLEAR
BACTERIA URNS QL MICRO: ABNORMAL /HPF
BILIRUB UR QL: NEGATIVE
COLOR UR: YELLOW
EPITH CASTS URNS QL MICRO: ABNORMAL /LPF (ref 0–5)
GLUCOSE UR STRIP.AUTO-MCNC: NEGATIVE MG/DL
HCG UR QL: NEGATIVE
HGB UR QL STRIP: ABNORMAL
KETONES UR QL STRIP.AUTO: NEGATIVE MG/DL
LEUKOCYTE ESTERASE UR QL STRIP.AUTO: NEGATIVE
NITRITE UR QL STRIP.AUTO: NEGATIVE
PH UR STRIP: 6.5 [PH] (ref 5–8)
PROT UR STRIP-MCNC: 100 MG/DL
RBC #/AREA URNS HPF: ABNORMAL /HPF (ref 0–5)
SERVICE CMNT-IMP: NORMAL
SP GR UR REFRACTOMETRY: 1.02 (ref 1–1.03)
UROBILINOGEN UR QL STRIP.AUTO: 1 EU/DL (ref 0.2–1)
WBC URNS QL MICRO: ABNORMAL /HPF (ref 0–4)
WET PREP GENITAL: NORMAL

## 2019-05-21 PROCEDURE — 81001 URINALYSIS AUTO W/SCOPE: CPT

## 2019-05-21 PROCEDURE — 87210 SMEAR WET MOUNT SALINE/INK: CPT

## 2019-05-21 PROCEDURE — 99283 EMERGENCY DEPT VISIT LOW MDM: CPT

## 2019-05-21 PROCEDURE — 81025 URINE PREGNANCY TEST: CPT

## 2019-05-21 PROCEDURE — 87491 CHLMYD TRACH DNA AMP PROBE: CPT

## 2019-05-21 PROCEDURE — 74176 CT ABD & PELVIS W/O CONTRAST: CPT

## 2019-05-21 RX ORDER — NITROFURANTOIN 25; 75 MG/1; MG/1
100 CAPSULE ORAL 2 TIMES DAILY
Qty: 14 CAP | Refills: 0 | Status: SHIPPED | OUTPATIENT
Start: 2019-05-21 | End: 2019-05-28

## 2019-05-21 RX ORDER — METRONIDAZOLE 500 MG/1
500 TABLET ORAL 2 TIMES DAILY
Qty: 14 TAB | Refills: 0 | Status: SHIPPED | OUTPATIENT
Start: 2019-05-21 | End: 2019-05-28

## 2019-05-21 NOTE — ED PROVIDER NOTES
EMERGENCY DEPARTMENT HISTORY AND PHYSICAL EXAM    Date: 5/21/2019  Patient Name: Gio Couch    History of Presenting Illness     Chief Complaint   Patient presents with    Flank Pain         History Provided By: Patient    Chief Complaint: flank pain  Duration: 1 Weeks  Timing:  Constant  Location: right flank  Quality: Cramping  Severity: Mild  Modifying Factors: none  Associated Symptoms: urgency, frequency, vaginal discharge      Additional History (Context): Gio Couch is a 21 y.o. female with asthma who presents with right flank pain, urinary frequency, urinary urgency x 1 week. Pain is constant. She reports white vaginal discharge. Denies pelvic pain, vaginal bleeding, fever, chills, nausea, vomiting or any other complaints or symptoms. Patient denies history of STIs. She does state her urine is very dark and malodorous. Does recall history of one UTI when she was pregnant. PCP: None    Current Outpatient Medications   Medication Sig Dispense Refill    nitrofurantoin, macrocrystal-monohydrate, (MACROBID) 100 mg capsule Take 1 Cap by mouth two (2) times a day for 7 days. 14 Cap 0    metroNIDAZOLE (FLAGYL) 500 mg tablet Take 1 Tab by mouth two (2) times a day for 7 days. 14 Tab 0    albuterol (PROVENTIL HFA, VENTOLIN HFA, PROAIR HFA) 90 mcg/actuation inhaler Take 2 Puffs by inhalation every four (4) hours as needed for Wheezing. 1 Inhaler 1    Camphor-Eucalyptus Oil-Menthol (VICKS VAPORUB) 4.8-1.2-2.6 % oint 1 Actuation(s) by Apply Externally route three (3) times daily as needed. 50 g 0    dextromethorphan-guaiFENesin (ROBITUSSIN-DM)  mg/5 mL syrup Take 10 mL by mouth every six (6) hours as needed for Cough. 240 mL 0    dextromethorphan-guaiFENesin (CORICIDIN HBP)  mg cap Take 1 Cap by mouth two (2) times daily as needed.  20 Cap 0    albuterol (PROVENTIL HFA, VENTOLIN HFA, PROAIR HFA) 90 mcg/actuation inhaler Take 1 Puff by inhalation every four (4) hours as needed for Wheezing. 1 Inhaler 0    predniSONE (DELTASONE) 50 mg tablet Take 1 Tab by mouth daily. 4 Tab 0    ibuprofen (MOTRIN) 800 mg tablet Take 1 Tab by mouth every eight (8) hours as needed. 61 Tab 3       Past History     Past Medical History:  Past Medical History:   Diagnosis Date    Asthma     last attack years ago       Past Surgical History:  History reviewed. No pertinent surgical history. Family History:  Family History   Problem Relation Age of Onset    No Known Problems Mother        Social History:  Social History     Tobacco Use    Smoking status: Never Smoker    Smokeless tobacco: Never Used   Substance Use Topics    Alcohol use: No    Drug use: Yes       Allergies: Allergies   Allergen Reactions    Percocet [Oxycodone-Acetaminophen] Swelling     Pt can not remember if her mom told her  percocet or penicillin allergy so she does not take either. patient reports tylenol makes her eyes swell    Tylenol [Acetaminophen] Anaphylaxis    Penicillins Other (comments)         Review of Systems   Review of Systems   Constitutional: Negative for chills and fever. Respiratory: Negative. Cardiovascular: Negative. Gastrointestinal: Negative. Genitourinary: Positive for flank pain, frequency, urgency and vaginal discharge. Negative for hematuria, pelvic pain and vaginal bleeding. Neurological: Negative. All other systems reviewed and are negative. All Other Systems Negative  Physical Exam     Vitals:    05/21/19 1033   BP: 142/87   Pulse: 90   Resp: 14   Temp: 97.4 °F (36.3 °C)   SpO2: 100%   Weight: 118.8 kg (262 lb)   Height: 5' (1.524 m)     Physical Exam   Constitutional: She appears well-developed and well-nourished. No distress. HENT:   Head: Normocephalic and atraumatic.    Right Ear: Hearing and external ear normal.   Left Ear: Hearing and external ear normal.   Nose: Nose normal.   Mouth/Throat: Uvula is midline, oropharynx is clear and moist and mucous membranes are normal. Eyes: Conjunctivae are normal.   Neck: Normal range of motion. Cardiovascular: Normal rate. Pulmonary/Chest: Effort normal.   Abdominal: Soft. She exhibits no distension. There is no tenderness. There is no rebound. Musculoskeletal: Normal range of motion. Neurological: She is alert. Skin: Skin is warm and dry. She is not diaphoretic. Psychiatric: She has a normal mood and affect. Diagnostic Study Results     Labs -     Recent Results (from the past 12 hour(s))   URINALYSIS W/ RFLX MICROSCOPIC    Collection Time: 05/21/19 10:35 AM   Result Value Ref Range    Color YELLOW      Appearance CLEAR      Specific gravity 1.019 1.005 - 1.030      pH (UA) 6.5 5.0 - 8.0      Protein 100 (A) NEG mg/dL    Glucose NEGATIVE  NEG mg/dL    Ketone NEGATIVE  NEG mg/dL    Bilirubin NEGATIVE  NEG      Blood TRACE (A) NEG      Urobilinogen 1.0 0.2 - 1.0 EU/dL    Nitrites NEGATIVE  NEG      Leukocyte Esterase NEGATIVE  NEG     URINE MICROSCOPIC ONLY    Collection Time: 05/21/19 10:35 AM   Result Value Ref Range    WBC 0 to 1 0 - 4 /hpf    RBC 2 to 4 0 - 5 /hpf    Epithelial cells 1+ 0 - 5 /lpf    Bacteria FEW (A) NEG /hpf   HCG URINE, QL    Collection Time: 05/21/19 10:35 AM   Result Value Ref Range    HCG urine, QL NEGATIVE  NEG     WET PREP    Collection Time: 05/21/19  1:10 PM   Result Value Ref Range    Special Requests: NO SPECIAL REQUESTS      Wet prep MODERATE  CLUE CELLS PRESENT        Wet prep NO TRICHOMONAS SEEN      Wet prep NO YEAST SEEN         Radiologic Studies -   CT ABD PELV WO CONT   Final Result   No renal calculi or obstructive uropathy. Normal gallbladder and appendix. No dilated loops of bowel, free fluid or free air in the peritoneum. 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 No pathologic lymphadenopathy.         CT Results  (Last 48 hours)               05/21/19 1416  CT ABD PELV WO CONT Final result Impression:  No renal calculi or obstructive uropathy. Normal gallbladder and appendix. No dilated loops of bowel, free fluid or free air in the peritoneum. 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 No pathologic lymphadenopathy. Narrative:  EXAM: CT ABD PELV WO CONT       INDICATION: right flank pain r/o stone       COMPARISON: None. CONTRAST: None. TECHNIQUE:    Unenhanced multislice helical CT was performed from the diaphragm to the   symphysis pubis without oral or intravenous contrast administration. Contiguous   5 mm axial images were reconstructed and lung and soft tissue windows were   generated. Coronal and sagittal reformations were generated. CT dose reduction   was achieved through use of a standardized protocol tailored for this   examination and automatic exposure control for dose modulation. FINDINGS:   The visualized portions of the lung bases are clear. The absence of intravenous contrast material reduces the sensitivity for   evaluation of the solid parenchymal organs of the abdomen. No focal   abnormalities are seen within the liver, spleen, pancreas or adrenal glands or   kidneys. No high attenuation filling defects are seen in the gallbladder. No   renal or ureteral calculus or obstruction is identified. The aorta tapers without aneurysm. There is no retroperitoneal adenopathy or   mass. The bowel is not obstructed. The appendix is visualized and appears normal..   There is no ascites or free intraperitoneal air. The adnexa are normal. Uterus is normal in size shape and contour. No aggressive   lytic or blastic lesion is seen within the visualized skeletal structures. There   appears to be an abnormality most likely congenital to the right acetabulum and   right femoral head with mild lateral subluxation of the right femoral head. .               CXR Results (Last 48 hours)    None            Medical Decision Making   I am the first provider for this patient. I reviewed the vital signs, available nursing notes, past medical history, past surgical history, family history and social history. Vital Signs-Reviewed the patient's vital signs. Pulse Oximetry Analysis - 100% on RA    Records Reviewed: Nursing Notes    Procedures:  Procedures    Provider Notes (Medical Decision Making): 76-year-old female complaining of right flank pain and urinary urgency for the past 1 week. No history of kidney stones or STIs. Does not have a UTI on the UA does have microscopic hematuria. Does have BV on wet prep. Will CT the abdomen to make sure she does not have a kidney stone. TRAVIS Dunn 1:50 PM    CT neg for stones. Will treat for UTI and BV. MED RECONCILIATION:  No current facility-administered medications for this encounter. Current Outpatient Medications   Medication Sig    nitrofurantoin, macrocrystal-monohydrate, (MACROBID) 100 mg capsule Take 1 Cap by mouth two (2) times a day for 7 days.  metroNIDAZOLE (FLAGYL) 500 mg tablet Take 1 Tab by mouth two (2) times a day for 7 days.  albuterol (PROVENTIL HFA, VENTOLIN HFA, PROAIR HFA) 90 mcg/actuation inhaler Take 2 Puffs by inhalation every four (4) hours as needed for Wheezing.  Camphor-Eucalyptus Oil-Menthol (VICKS VAPORUB) 4.8-1.2-2.6 % oint 1 Actuation(s) by Apply Externally route three (3) times daily as needed.  dextromethorphan-guaiFENesin (ROBITUSSIN-DM)  mg/5 mL syrup Take 10 mL by mouth every six (6) hours as needed for Cough.  dextromethorphan-guaiFENesin (CORICIDIN HBP)  mg cap Take 1 Cap by mouth two (2) times daily as needed.  albuterol (PROVENTIL HFA, VENTOLIN HFA, PROAIR HFA) 90 mcg/actuation inhaler Take 1 Puff by inhalation every four (4) hours as needed for Wheezing.  predniSONE (DELTASONE) 50 mg tablet Take 1 Tab by mouth daily.     ibuprofen (MOTRIN) 800 mg tablet Take 1 Tab by mouth every eight (8) hours as needed. Disposition:  home    DISCHARGE NOTE:     Pt has been reexamined. Patient has no new complaints, changes, or physical findings. Care plan outlined and precautions discussed. Results of labs and CT were reviewed with the patient. All medications were reviewed with the patient; will d/c home with flagyl and macrobid. All of pt's questions and concerns were addressed. Patient was instructed and agrees to follow up with pcp, as well as to return to the ED upon further deterioration. Patient is ready to go home. Follow-up Information     Follow up With Specialties Details Why [address] [place] [id number] [phone]          Current Discharge Medication List      START taking these medications    Details   nitrofurantoin, macrocrystal-monohydrate, (MACROBID) 100 mg capsule Take 1 Cap by mouth two (2) times a day for 7 days. Qty: 14 Cap, Refills: 0      metroNIDAZOLE (FLAGYL) 500 mg tablet Take 1 Tab by mouth two (2) times a day for 7 days. Qty: 14 Tab, Refills: 0                 Diagnosis     Clinical Impression:   1. BV (bacterial vaginosis)    2.  Urinary tract infection, acute

## 2019-05-21 NOTE — DISCHARGE INSTRUCTIONS
Patient Education        Bacterial Vaginosis: Care Instructions  Your Care Instructions    Bacterial vaginosis is a type of vaginal infection. It is caused by excess growth of certain bacteria that are normally found in the vagina. Symptoms can include itching, swelling, pain when you urinate or have sex, and a gray or yellow discharge with a \"fishy\" odor. It is not considered an infection that is spread through sexual contact. Although symptoms can be annoying and uncomfortable, bacterial vaginosis does not usually cause other health problems. However, if you have it while you are pregnant, it can cause complications. While the infection may go away on its own, most doctors use antibiotics to treat it. You may have been prescribed pills or vaginal cream. With treatment, bacterial vaginosis usually clears up in 5 to 7 days. Follow-up care is a key part of your treatment and safety. Be sure to make and go to all appointments, and call your doctor if you are having problems. It's also a good idea to know your test results and keep a list of the medicines you take. How can you care for yourself at home? · Take your antibiotics as directed. Do not stop taking them just because you feel better. You need to take the full course of antibiotics. · Do not eat or drink anything that contains alcohol if you are taking metronidazole (Flagyl). · Keep using your medicine if you start your period. Use pads instead of tampons while using a vaginal cream or suppository. Tampons can absorb the medicine. · Wear loose cotton clothing. Do not wear nylon and other materials that hold body heat and moisture close to the skin. · Do not scratch. Relieve itching with a cold pack or a cool bath. · Do not wash your vaginal area more than once a day. Use plain water or a mild, unscented soap. Do not douche. When should you call for help?   Watch closely for changes in your health, and be sure to contact your doctor if:    · You have unexpected vaginal bleeding.     · You have a fever.     · You have new or increased pain in your vagina or pelvis.     · You are not getting better after 1 week.     · Your symptoms return after you finish the course of your medicine. Where can you learn more? Go to http://dia-rony.info/. Gertrude Chiu in the search box to learn more about \"Bacterial Vaginosis: Care Instructions. \"  Current as of: May 14, 2018  Content Version: 11.9  © 0222-7614 [employer]. Care instructions adapted under license by [employer] (which disclaims liability or warranty for this information). If you have questions about a medical condition or this instruction, always ask your healthcare professional. [place] [address] any warranty or liability for your use of this information. Patient Education        Urinary Tract Infection in Women: Care Instructions  Your Care Instructions    A urinary tract infection, or UTI, is a general term for an infection anywhere between the kidneys and the urethra (where urine comes out). Most UTIs are bladder infections. They often cause pain or burning when you urinate. UTIs are caused by bacteria and can be cured with antibiotics. Be sure to complete your treatment so that the infection goes away. Follow-up care is a key part of your treatment and safety. Be sure to make and go to all appointments, and call your doctor if you are having problems. It's also a good idea to know your test results and keep a list of the medicines you take. How can you care for yourself at home? · Take your antibiotics as directed. Do not stop taking them just because you feel better. You need to take the full course of antibiotics. · Drink extra water and other fluids for the next day or two. This may help wash out the bacteria that are causing the infection.  (If you have kidney, heart, or liver disease and have to limit fluids, talk with your doctor before you increase your fluid intake.)  · Avoid drinks that are carbonated or have caffeine. They can irritate the bladder. · Urinate often. Try to empty your bladder each time. · To relieve pain, take a hot bath or lay a heating pad set on low over your lower belly or genital area. Never go to sleep with a heating pad in place. To prevent UTIs  · Drink plenty of water each day. This helps you urinate often, which clears bacteria from your system. (If you have kidney, heart, or liver disease and have to limit fluids, talk with your doctor before you increase your fluid intake.)  · Urinate when you need to. · Urinate right after you have sex. · Change sanitary pads often. · Avoid douches, bubble baths, feminine hygiene sprays, and other feminine hygiene products that have deodorants. · After going to the bathroom, wipe from front to back. When should you call for help? Call your doctor now or seek immediate medical care if:    · Symptoms such as fever, chills, nausea, or vomiting get worse or appear for the first time.     · You have new pain in your back just below your rib cage. This is called flank pain.     · There is new blood or pus in your urine.     · You have any problems with your antibiotic medicine.    Watch closely for changes in your health, and be sure to contact your doctor if:    · You are not getting better after taking an antibiotic for 2 days.     · Your symptoms go away but then come back. Where can you learn more? Go to http://dia-rony.info/. Enter U664 in the search box to learn more about \"Urinary Tract Infection in Women: Care Instructions. \"  Current as of: March 20, 2018  Content Version: 11.9  © 0479-1350 [employer]. Care instructions adapted under license by [employer] (which disclaims liability or warranty for this information).  If you have questions about a medical condition or this instruction, always ask your healthcare professional. [address] any warranty or liability for your use of this information.

## 2019-11-17 ENCOUNTER — APPOINTMENT (OUTPATIENT)
Dept: ULTRASOUND IMAGING | Age: 24
End: 2019-11-17
Attending: EMERGENCY MEDICINE
Payer: COMMERCIAL

## 2019-11-17 ENCOUNTER — HOSPITAL ENCOUNTER (EMERGENCY)
Age: 24
Discharge: HOME | End: 2019-11-17
Attending: EMERGENCY MEDICINE
Payer: COMMERCIAL

## 2019-11-17 VITALS
HEART RATE: 88 BPM | HEIGHT: 60 IN | RESPIRATION RATE: 18 BRPM | OXYGEN SATURATION: 100 % | DIASTOLIC BLOOD PRESSURE: 78 MMHG | BODY MASS INDEX: 47.91 KG/M2 | WEIGHT: 244 LBS | TEMPERATURE: 97.7 F | SYSTOLIC BLOOD PRESSURE: 129 MMHG

## 2019-11-17 DIAGNOSIS — B96.89 BV (BACTERIAL VAGINOSIS): ICD-10-CM

## 2019-11-17 DIAGNOSIS — N76.0 BV (BACTERIAL VAGINOSIS): ICD-10-CM

## 2019-11-17 DIAGNOSIS — O20.0 THREATENED MISCARRIAGE IN EARLY PREGNANCY: Primary | ICD-10-CM

## 2019-11-17 LAB
ALBUMIN SERPL-MCNC: 3.2 G/DL (ref 3.4–5)
ALBUMIN/GLOB SERPL: 0.8 {RATIO} (ref 0.8–1.7)
ALP SERPL-CCNC: 94 U/L (ref 45–117)
ALT SERPL-CCNC: 32 U/L (ref 13–56)
ANION GAP SERPL CALC-SCNC: 3 MMOL/L (ref 3–18)
APPEARANCE UR: CLEAR
AST SERPL-CCNC: 27 U/L (ref 10–38)
BACTERIA URNS QL MICRO: ABNORMAL /HPF
BASOPHILS # BLD: 0 K/UL (ref 0–0.1)
BASOPHILS NFR BLD: 0 % (ref 0–2)
BILIRUB SERPL-MCNC: 0.3 MG/DL (ref 0.2–1)
BILIRUB UR QL: NEGATIVE
BUN SERPL-MCNC: 8 MG/DL (ref 7–18)
BUN/CREAT SERPL: 10 (ref 12–20)
CALCIUM SERPL-MCNC: 8.6 MG/DL (ref 8.5–10.1)
CHLORIDE SERPL-SCNC: 111 MMOL/L (ref 100–111)
CO2 SERPL-SCNC: 26 MMOL/L (ref 21–32)
COLOR UR: YELLOW
CREAT SERPL-MCNC: 0.83 MG/DL (ref 0.6–1.3)
DIFFERENTIAL METHOD BLD: ABNORMAL
EOSINOPHIL # BLD: 0.2 K/UL (ref 0–0.4)
EOSINOPHIL NFR BLD: 2 % (ref 0–5)
EPITH CASTS URNS QL MICRO: ABNORMAL /LPF (ref 0–5)
ERYTHROCYTE [DISTWIDTH] IN BLOOD BY AUTOMATED COUNT: 14.6 % (ref 11.6–14.5)
GLOBULIN SER CALC-MCNC: 4.2 G/DL (ref 2–4)
GLUCOSE SERPL-MCNC: 102 MG/DL (ref 74–99)
GLUCOSE UR STRIP.AUTO-MCNC: NEGATIVE MG/DL
HCG SERPL-ACNC: 3775 MIU/ML (ref 0–10)
HCG UR QL: POSITIVE
HCT VFR BLD AUTO: 40.9 % (ref 35–45)
HGB BLD-MCNC: 13.6 G/DL (ref 12–16)
HGB UR QL STRIP: ABNORMAL
KETONES UR QL STRIP.AUTO: 40 MG/DL
LEUKOCYTE ESTERASE UR QL STRIP.AUTO: NEGATIVE
LYMPHOCYTES # BLD: 2.9 K/UL (ref 0.9–3.6)
LYMPHOCYTES NFR BLD: 27 % (ref 21–52)
MCH RBC QN AUTO: 26.7 PG (ref 24–34)
MCHC RBC AUTO-ENTMCNC: 33.3 G/DL (ref 31–37)
MCV RBC AUTO: 80.2 FL (ref 74–97)
MONOCYTES # BLD: 0.5 K/UL (ref 0.05–1.2)
MONOCYTES NFR BLD: 5 % (ref 3–10)
MUCOUS THREADS URNS QL MICRO: ABNORMAL /LPF
NEUTS SEG # BLD: 6.9 K/UL (ref 1.8–8)
NEUTS SEG NFR BLD: 66 % (ref 40–73)
NITRITE UR QL STRIP.AUTO: NEGATIVE
PH UR STRIP: 6.5 [PH] (ref 5–8)
PLATELET # BLD AUTO: 497 K/UL (ref 135–420)
PMV BLD AUTO: 9.1 FL (ref 9.2–11.8)
POTASSIUM SERPL-SCNC: 4.2 MMOL/L (ref 3.5–5.5)
PROT SERPL-MCNC: 7.4 G/DL (ref 6.4–8.2)
PROT UR STRIP-MCNC: 100 MG/DL
RBC # BLD AUTO: 5.1 M/UL (ref 4.2–5.3)
RBC #/AREA URNS HPF: ABNORMAL /HPF (ref 0–5)
SERVICE CMNT-IMP: NORMAL
SODIUM SERPL-SCNC: 140 MMOL/L (ref 136–145)
SP GR UR REFRACTOMETRY: 1.02 (ref 1–1.03)
UROBILINOGEN UR QL STRIP.AUTO: 1 EU/DL (ref 0.2–1)
WBC # BLD AUTO: 10.4 K/UL (ref 4.6–13.2)
WBC URNS QL MICRO: ABNORMAL /HPF (ref 0–4)
WET PREP GENITAL: NORMAL

## 2019-11-17 PROCEDURE — 87210 SMEAR WET MOUNT SALINE/INK: CPT

## 2019-11-17 PROCEDURE — 85025 COMPLETE CBC W/AUTO DIFF WBC: CPT

## 2019-11-17 PROCEDURE — 81025 URINE PREGNANCY TEST: CPT

## 2019-11-17 PROCEDURE — 87491 CHLMYD TRACH DNA AMP PROBE: CPT

## 2019-11-17 PROCEDURE — 74011250637 HC RX REV CODE- 250/637: Performed by: EMERGENCY MEDICINE

## 2019-11-17 PROCEDURE — 84702 CHORIONIC GONADOTROPIN TEST: CPT

## 2019-11-17 PROCEDURE — 80053 COMPREHEN METABOLIC PANEL: CPT

## 2019-11-17 PROCEDURE — 74011000250 HC RX REV CODE- 250: Performed by: EMERGENCY MEDICINE

## 2019-11-17 PROCEDURE — 87086 URINE CULTURE/COLONY COUNT: CPT

## 2019-11-17 PROCEDURE — 76817 TRANSVAGINAL US OBSTETRIC: CPT

## 2019-11-17 PROCEDURE — 99284 EMERGENCY DEPT VISIT MOD MDM: CPT

## 2019-11-17 PROCEDURE — 96372 THER/PROPH/DIAG INJ SC/IM: CPT

## 2019-11-17 PROCEDURE — 74011250636 HC RX REV CODE- 250/636: Performed by: EMERGENCY MEDICINE

## 2019-11-17 PROCEDURE — 81001 URINALYSIS AUTO W/SCOPE: CPT

## 2019-11-17 RX ORDER — AZITHROMYCIN 250 MG/1
1000 TABLET, FILM COATED ORAL
Status: COMPLETED | OUTPATIENT
Start: 2019-11-17 | End: 2019-11-17

## 2019-11-17 RX ORDER — METRONIDAZOLE 500 MG/1
500 TABLET ORAL 2 TIMES DAILY
Qty: 14 TAB | Refills: 0 | Status: SHIPPED | OUTPATIENT
Start: 2019-11-17 | End: 2019-11-24

## 2019-11-17 RX ORDER — METOCLOPRAMIDE 10 MG/1
10 TABLET ORAL
Qty: 12 TAB | Refills: 0 | Status: SHIPPED | OUTPATIENT
Start: 2019-11-17 | End: 2019-11-27

## 2019-11-17 RX ADMIN — LIDOCAINE HYDROCHLORIDE 250 MG: 10 INJECTION, SOLUTION EPIDURAL; INFILTRATION; INTRACAUDAL; PERINEURAL at 16:38

## 2019-11-17 RX ADMIN — AZITHROMYCIN MONOHYDRATE 1000 MG: 250 TABLET ORAL at 16:37

## 2019-11-17 NOTE — ED TRIAGE NOTES
Patient arrived via EMS stating that she has missed her period last month and took three pregnancy test and they all came back positive. LMP was Oct 12, 2019. Patient states that she is having abdominal pain and breast tenderness. She states that she had the same symptoms with her daughter 3 years ago.

## 2019-11-17 NOTE — LETTER
NOTIFICATION RETURN TO WORK / SCHOOL 
 
11/17/2019 4:38 PM 
 
Ms. Kevin Wade [address] [place] [address] To Whom It May Concern: 
 
Kevin Wade is currently under the care of [facility] EMERGENCY DEPT. She will return to work/school on: 11/18/19 but may not lift loads greater than 8lbs for two months. If there are questions or concerns please have the patient contact our office.  
 
 
 
Sincerely, 
 
 
Humberto Bansal PA-C

## 2019-11-17 NOTE — ED NOTES
Spoke with ultrasound tech who is coming on her way in to take care of patient's ordered exam.    I performed a brief evaluation, including history and physical, of the patient here in triage and I have determined that pt will need further treatment and evaluation from the main side ER physician. I have placed initial orders to help in expediting patients care. November 17, 2019 at 2:03 PM - TRAVIS Forrest        There were no vitals taken for this visit.

## 2019-11-17 NOTE — ED PROVIDER NOTES
EMERGENCY DEPARTMENT HISTORY AND PHYSICAL EXAM    Date: 11/17/2019  Patient Name: Giovana Maurice    History of Presenting Illness     Chief Complaint   Patient presents with    Abdominal Pain         History Provided By: Patient  Additional History (Context): Giovana Maurice is a 21 y.o. female with obesity, asthma and G2, P1 AB O L1 with element P October 12 who presents with cramping pelvic pain for a week. Took 3 pregnancy tests at home all of which were positive. Denies any vaginal bleeding. Does not have prenatal care yet. Denies dysuria fever flank pain. PCP: None    Current Facility-Administered Medications   Medication Dose Route Frequency Provider Last Rate Last Dose    sodium chloride 0.9 % bolus infusion 1,000 mL  1,000 mL IntraVENous ONCE Lizz White PA         Current Outpatient Medications   Medication Sig Dispense Refill    albuterol (PROVENTIL HFA, VENTOLIN HFA, PROAIR HFA) 90 mcg/actuation inhaler Take 2 Puffs by inhalation every four (4) hours as needed for Wheezing. 1 Inhaler 1    Camphor-Eucalyptus Oil-Menthol (VICKS VAPORUB) 4.8-1.2-2.6 % oint 1 Actuation(s) by Apply Externally route three (3) times daily as needed. 50 g 0    dextromethorphan-guaiFENesin (ROBITUSSIN-DM)  mg/5 mL syrup Take 10 mL by mouth every six (6) hours as needed for Cough. 240 mL 0    dextromethorphan-guaiFENesin (CORICIDIN HBP)  mg cap Take 1 Cap by mouth two (2) times daily as needed. 20 Cap 0    albuterol (PROVENTIL HFA, VENTOLIN HFA, PROAIR HFA) 90 mcg/actuation inhaler Take 1 Puff by inhalation every four (4) hours as needed for Wheezing. 1 Inhaler 0    predniSONE (DELTASONE) 50 mg tablet Take 1 Tab by mouth daily. 4 Tab 0    ibuprofen (MOTRIN) 800 mg tablet Take 1 Tab by mouth every eight (8) hours as needed.  61 Tab 3       Past History     Past Medical History:  Past Medical History:   Diagnosis Date    Asthma     last attack years ago       Past Surgical History:  No past surgical history on file. Family History:  Family History   Problem Relation Age of Onset    No Known Problems Mother        Social History:  Social History     Tobacco Use    Smoking status: Never Smoker    Smokeless tobacco: Never Used   Substance Use Topics    Alcohol use: No    Drug use: Yes       Allergies: Allergies   Allergen Reactions    Percocet [Oxycodone-Acetaminophen] Swelling     Pt can not remember if her mom told her  percocet or penicillin allergy so she does not take either. patient reports tylenol makes her eyes swell    Tylenol [Acetaminophen] Anaphylaxis    Penicillins Other (comments)         Review of Systems   Review of Systems   Constitutional: Negative for fever. Gastrointestinal: Positive for nausea. Negative for vomiting. Genitourinary: Positive for pelvic pain. Negative for dysuria, flank pain and vaginal bleeding. All Other Systems Negative  Physical Exam     Vitals:    11/17/19 1402   BP: 129/78   Pulse: 88   Resp: 18   Temp: 97.7 °F (36.5 °C)   SpO2: 100%   Weight: 110.7 kg (244 lb)   Height: 5' (1.524 m)     Physical Exam   Constitutional: She is oriented to person, place, and time. She appears well-developed. HENT:   Head: Normocephalic and atraumatic. Eyes: Pupils are equal, round, and reactive to light. Neck: No JVD present. No tracheal deviation present. No thyromegaly present. Cardiovascular: Normal rate, regular rhythm and normal heart sounds. Exam reveals no gallop and no friction rub. No murmur heard. Pulmonary/Chest: Effort normal and breath sounds normal. No stridor. No respiratory distress. She has no wheezes. She has no rales. She exhibits no tenderness. Abdominal: Soft. She exhibits no distension and no mass. There is no tenderness. There is no rebound and no guarding. Musculoskeletal: She exhibits no edema or tenderness. Lymphadenopathy:     She has no cervical adenopathy. Neurological: She is alert and oriented to person, place, and time. Skin: Skin is warm and dry. No rash noted. No erythema. No pallor. Psychiatric: She has a normal mood and affect. Her behavior is normal. Thought content normal.   Nursing note and vitals reviewed. Diagnostic Study Results     Labs -     Recent Results (from the past 12 hour(s))   CBC WITH AUTOMATED DIFF    Collection Time: 11/17/19  2:16 PM   Result Value Ref Range    WBC 10.4 4.6 - 13.2 K/uL    RBC 5.10 4.20 - 5.30 M/uL    HGB 13.6 12.0 - 16.0 g/dL    HCT 40.9 35.0 - 45.0 %    MCV 80.2 74.0 - 97.0 FL    MCH 26.7 24.0 - 34.0 PG    MCHC 33.3 31.0 - 37.0 g/dL    RDW 14.6 (H) 11.6 - 14.5 %    PLATELET 404 (H) 652 - 420 K/uL    MPV 9.1 (L) 9.2 - 11.8 FL    NEUTROPHILS 66 40 - 73 %    LYMPHOCYTES 27 21 - 52 %    MONOCYTES 5 3 - 10 %    EOSINOPHILS 2 0 - 5 %    BASOPHILS 0 0 - 2 %    ABS. NEUTROPHILS 6.9 1.8 - 8.0 K/UL    ABS. LYMPHOCYTES 2.9 0.9 - 3.6 K/UL    ABS. MONOCYTES 0.5 0.05 - 1.2 K/UL    ABS. EOSINOPHILS 0.2 0.0 - 0.4 K/UL    ABS. BASOPHILS 0.0 0.0 - 0.1 K/UL    DF AUTOMATED     METABOLIC PANEL, COMPREHENSIVE    Collection Time: 11/17/19  2:16 PM   Result Value Ref Range    Sodium 140 136 - 145 mmol/L    Potassium 4.2 3.5 - 5.5 mmol/L    Chloride 111 100 - 111 mmol/L    CO2 26 21 - 32 mmol/L    Anion gap 3 3.0 - 18 mmol/L    Glucose 102 (H) 74 - 99 mg/dL    BUN 8 7.0 - 18 MG/DL    Creatinine 0.83 0.6 - 1.3 MG/DL    BUN/Creatinine ratio 10 (L) 12 - 20      GFR est AA >60 >60 ml/min/1.73m2    GFR est non-AA >60 >60 ml/min/1.73m2    Calcium 8.6 8.5 - 10.1 MG/DL    Bilirubin, total 0.3 0.2 - 1.0 MG/DL    ALT (SGPT) 32 13 - 56 U/L    AST (SGOT) 27 10 - 38 U/L    Alk.  phosphatase 94 45 - 117 U/L    Protein, total 7.4 6.4 - 8.2 g/dL    Albumin 3.2 (L) 3.4 - 5.0 g/dL    Globulin 4.2 (H) 2.0 - 4.0 g/dL    A-G Ratio 0.8 0.8 - 1.7     BETA HCG, QT    Collection Time: 11/17/19  2:16 PM   Result Value Ref Range    Beta HCG, QT 3,775 (H) 0 - 10 MIU/ML   URINALYSIS W/ RFLX MICROSCOPIC    Collection Time: 11/17/19 2:16 PM   Result Value Ref Range    Color YELLOW      Appearance CLEAR      Specific gravity 1.021 1.005 - 1.030      pH (UA) 6.5 5.0 - 8.0      Protein 100 (A) NEG mg/dL    Glucose NEGATIVE  NEG mg/dL    Ketone 40 (A) NEG mg/dL    Bilirubin NEGATIVE  NEG      Blood TRACE (A) NEG      Urobilinogen 1.0 0.2 - 1.0 EU/dL    Nitrites NEGATIVE  NEG      Leukocyte Esterase NEGATIVE  NEG     HCG URINE, QL    Collection Time: 11/17/19  2:16 PM   Result Value Ref Range    HCG urine, QL POSITIVE (A) NEG     URINE MICROSCOPIC ONLY    Collection Time: 11/17/19  2:16 PM   Result Value Ref Range    WBC 0 to 3 0 - 4 /hpf    RBC NONE 0 - 5 /hpf    Epithelial cells 2+ 0 - 5 /lpf    Bacteria 1+ (A) NEG /hpf    Mucus 1+ (A) NEG /lpf       Radiologic Studies -   US OB TV W DOPPLER   Final Result   Impression:         1. There is a very small gestational sac within the endometrial canal. There   also appears to be a very small yolk sac and fetal pole. All measurements are   too small for dating. Pregnancy probably less than 5 weeks. 2. Right ovarian cyst likely corpus luteum. 3. There is a small amount of free fluid in the pelvis. Findings likely represent very early intrauterine pregnancy. The presence of   ectopic pregnancy with pseudosac ectopic pregnancy not completely excluded. CT Results  (Last 48 hours)    None        CXR Results  (Last 48 hours)    None            Medical Decision Making   I am the first provider for this patient. I reviewed the vital signs, available nursing notes, past medical history, past surgical history, family history and social history. Vital Signs-Reviewed the patient's vital signs. Records Reviewed: Nursing Notes and Previous Laboratory Studies    Procedures:  Pelvic Exam  Date/Time: 11/17/2019 4:25 PM  Performed by: PA  Procedure duration:  2 minutes. Type of exam performed: bimanual and speculum. External genitalia appearance: normal.    Vaginal exam:  discharge.     The amount of discharge was:  moderate. The discharge was thick and white. Cervical exam:  normal, no cervical motion tenderness and os closed. Specimen(s) collected:  chlamydia, GC and vaginal culture. Bimanual exam:  normal.    Patient tolerance: Patient tolerated the procedure well with no immediate complications          Provider Notes (Medical Decision Making): Rh O is positive. She has BV will treat. Welcomes gonorrhea chlamydia prophylaxis. We will give her prenatal vitamins Flagyl and Reglan for home. Made several OB recommendations. Reviewed pelvic rest precautions. Understands importance of close OB follow-up. MED RECONCILIATION:  Current Facility-Administered Medications   Medication Dose Route Frequency    sodium chloride 0.9 % bolus infusion 1,000 mL  1,000 mL IntraVENous ONCE     Current Outpatient Medications   Medication Sig    albuterol (PROVENTIL HFA, VENTOLIN HFA, PROAIR HFA) 90 mcg/actuation inhaler Take 2 Puffs by inhalation every four (4) hours as needed for Wheezing.  Camphor-Eucalyptus Oil-Menthol (VICKS VAPORUB) 4.8-1.2-2.6 % oint 1 Actuation(s) by Apply Externally route three (3) times daily as needed.  dextromethorphan-guaiFENesin (ROBITUSSIN-DM)  mg/5 mL syrup Take 10 mL by mouth every six (6) hours as needed for Cough.  dextromethorphan-guaiFENesin (CORICIDIN HBP)  mg cap Take 1 Cap by mouth two (2) times daily as needed.  albuterol (PROVENTIL HFA, VENTOLIN HFA, PROAIR HFA) 90 mcg/actuation inhaler Take 1 Puff by inhalation every four (4) hours as needed for Wheezing.  predniSONE (DELTASONE) 50 mg tablet Take 1 Tab by mouth daily.  ibuprofen (MOTRIN) 800 mg tablet Take 1 Tab by mouth every eight (8) hours as needed. Disposition:  home    DISCHARGE NOTE:   4:27 PM    Pt has been reexamined. Patient has no new complaints, changes, or physical findings. Care plan outlined and precautions discussed.   Results of labs, US were reviewed with the patient. All medications were reviewed with the patient; will d/c home with flagyl, reglan, PNV. All of pt's questions and concerns were addressed. Patient was instructed and agrees to follow up with OB, as well as to return to the ED upon further deterioration. Patient is ready to go home. Follow-up Information    None         Current Discharge Medication List              Diagnosis     Clinical Impression: No diagnosis found.

## 2019-11-17 NOTE — DISCHARGE INSTRUCTIONS
Patient Education        Threatened Miscarriage: Care Instructions  Your Care Instructions    Some women have light spotting or bleeding during the first 12 weeks of pregnancy. In some cases this is normal. Light spotting or bleeding can also be a sign of a possible loss of the pregnancy. This is called a threatened miscarriage. At this point, the doctor may not be able to tell if your vaginal bleeding is normal or is a sign of a miscarriage. In early pregnancy, things such as stress, exercise, and sex do not cause miscarriage. You may be worried or upset about the possibility of losing your pregnancy. But do not blame yourself. There is no treatment to stop a threatened miscarriage. If you do have a miscarriage, there was nothing you could have done to prevent it. A miscarriage usually means that the pregnancy is not developing normally. The doctor has checked you carefully, but problems can develop later. If you notice any problems or new symptoms, get medical treatment right away. Follow-up care is a key part of your treatment and safety. Be sure to make and go to all appointments, and call your doctor if you are having problems. It's also a good idea to know your test results and keep a list of the medicines you take. How can you care for yourself at home? · If you do have a miscarriage, you will probably have some vaginal bleeding for 1 to 2 weeks. Use pads instead of tampons. · Take acetaminophen (Tylenol) for cramps. Read and follow all instructions on the label. · Do not take two or more pain medicines at the same time unless the doctor told you to. Many pain medicines have acetaminophen, which is Tylenol. Too much acetaminophen (Tylenol) can be harmful. · Do not have sex until your doctor says it is okay. · Get lots of rest over the next several days. · You may do your normal activities if you feel well enough to do them. But do not do any heavy exercise until your doctor says it is okay.   · Eat a balanced diet that is high in iron and vitamin C. Foods rich in iron include red meat, shellfish, eggs, beans, and leafy green vegetables. Foods high in vitamin C include citrus fruits, tomatoes, and broccoli. Talk to your doctor about whether you need to take iron pills or a multivitamin. · Do not drink alcohol or use tobacco or illegal drugs. · Do not smoke. If you need help quitting, talk to your doctor about stop-smoking programs and medicines. These can increase your chances of quitting for good. When should you call for help? Call 911 anytime you think you may need emergency care. For example, call if:    · You passed out (lost consciousness).    Call your doctor now or seek immediate medical care if:    · You have severe vaginal bleeding.     · You are dizzy or lightheaded, or you feel like you may faint.     · You have new or worse pain in your belly or pelvis.     · You have a fever.     · You have vaginal discharge that smells bad.    Watch closely for changes in your health, and be sure to contact your doctor if:    · You do not get better as expected. Where can you learn more? Go to http://dia-rony.info/. Enter Q426 in the search box to learn more about \"Threatened Miscarriage: Care Instructions. \"  Current as of: May 29, 2019  Content Version: 12.2  © 2680-3821 [employer]. Care instructions adapted under license by [employer] (which disclaims liability or warranty for this information). If you have questions about a medical condition or this instruction, always ask your healthcare professional. [place] [address] any warranty or liability for your use of this information.

## 2019-11-19 LAB
BACTERIA SPEC CULT: NORMAL
SERVICE CMNT-IMP: NORMAL

## 2020-03-09 ENCOUNTER — APPOINTMENT (OUTPATIENT)
Dept: ULTRASOUND IMAGING | Age: 25
End: 2020-03-09
Attending: EMERGENCY MEDICINE
Payer: COMMERCIAL

## 2020-03-09 ENCOUNTER — HOSPITAL ENCOUNTER (EMERGENCY)
Age: 25
Discharge: HOME | End: 2020-03-09
Attending: EMERGENCY MEDICINE
Payer: COMMERCIAL

## 2020-03-09 VITALS
HEART RATE: 90 BPM | OXYGEN SATURATION: 97 % | HEIGHT: 60 IN | SYSTOLIC BLOOD PRESSURE: 115 MMHG | WEIGHT: 250 LBS | DIASTOLIC BLOOD PRESSURE: 66 MMHG | TEMPERATURE: 99 F | RESPIRATION RATE: 12 BRPM | BODY MASS INDEX: 49.08 KG/M2

## 2020-03-09 DIAGNOSIS — K80.50 BILIARY COLIC: ICD-10-CM

## 2020-03-09 DIAGNOSIS — R79.89 ABNORMAL LFTS: ICD-10-CM

## 2020-03-09 DIAGNOSIS — K80.20 GALLSTONES: Primary | ICD-10-CM

## 2020-03-09 LAB
ALBUMIN SERPL-MCNC: 3.3 G/DL (ref 3.4–5)
ALBUMIN/GLOB SERPL: 0.7 {RATIO} (ref 0.8–1.7)
ALP SERPL-CCNC: 127 U/L (ref 45–117)
ALT SERPL-CCNC: 63 U/L (ref 13–56)
ANION GAP SERPL CALC-SCNC: 4 MMOL/L (ref 3–18)
APPEARANCE UR: CLEAR
AST SERPL-CCNC: 124 U/L (ref 10–38)
BACTERIA URNS QL MICRO: ABNORMAL /HPF
BASOPHILS # BLD: 0 K/UL (ref 0–0.1)
BASOPHILS NFR BLD: 0 % (ref 0–2)
BILIRUB SERPL-MCNC: 0.5 MG/DL (ref 0.2–1)
BILIRUB UR QL: NEGATIVE
BUN SERPL-MCNC: 13 MG/DL (ref 7–18)
BUN/CREAT SERPL: 15 (ref 12–20)
CALCIUM SERPL-MCNC: 9 MG/DL (ref 8.5–10.1)
CHLORIDE SERPL-SCNC: 107 MMOL/L (ref 100–111)
CO2 SERPL-SCNC: 26 MMOL/L (ref 21–32)
COLOR UR: YELLOW
CREAT SERPL-MCNC: 0.84 MG/DL (ref 0.6–1.3)
DIFFERENTIAL METHOD BLD: ABNORMAL
EOSINOPHIL # BLD: 0.1 K/UL (ref 0–0.4)
EOSINOPHIL NFR BLD: 2 % (ref 0–5)
EPITH CASTS URNS QL MICRO: ABNORMAL /LPF (ref 0–5)
ERYTHROCYTE [DISTWIDTH] IN BLOOD BY AUTOMATED COUNT: 14.3 % (ref 11.6–14.5)
GLOBULIN SER CALC-MCNC: 4.8 G/DL (ref 2–4)
GLUCOSE SERPL-MCNC: 147 MG/DL (ref 74–99)
GLUCOSE UR STRIP.AUTO-MCNC: NEGATIVE MG/DL
HCG UR QL: NEGATIVE
HCT VFR BLD AUTO: 42.4 % (ref 35–45)
HGB BLD-MCNC: 14.1 G/DL (ref 12–16)
HGB UR QL STRIP: NEGATIVE
HYALINE CASTS URNS QL MICRO: ABNORMAL /LPF (ref 0–2)
KETONES UR QL STRIP.AUTO: NEGATIVE MG/DL
LEUKOCYTE ESTERASE UR QL STRIP.AUTO: NEGATIVE
LIPASE SERPL-CCNC: 82 U/L (ref 73–393)
LYMPHOCYTES # BLD: 1.6 K/UL (ref 0.9–3.6)
LYMPHOCYTES NFR BLD: 17 % (ref 21–52)
MCH RBC QN AUTO: 26.8 PG (ref 24–34)
MCHC RBC AUTO-ENTMCNC: 33.3 G/DL (ref 31–37)
MCV RBC AUTO: 80.6 FL (ref 74–97)
MONOCYTES # BLD: 0.6 K/UL (ref 0.05–1.2)
MONOCYTES NFR BLD: 6 % (ref 3–10)
NEUTS SEG # BLD: 7.2 K/UL (ref 1.8–8)
NEUTS SEG NFR BLD: 75 % (ref 40–73)
NITRITE UR QL STRIP.AUTO: NEGATIVE
PH UR STRIP: 6 [PH] (ref 5–8)
PLATELET # BLD AUTO: 493 K/UL (ref 135–420)
PMV BLD AUTO: 9.3 FL (ref 9.2–11.8)
POTASSIUM SERPL-SCNC: 4.4 MMOL/L (ref 3.5–5.5)
PROT SERPL-MCNC: 8.1 G/DL (ref 6.4–8.2)
PROT UR STRIP-MCNC: 30 MG/DL
RBC # BLD AUTO: 5.26 M/UL (ref 4.2–5.3)
RBC #/AREA URNS HPF: ABNORMAL /HPF (ref 0–5)
SODIUM SERPL-SCNC: 137 MMOL/L (ref 136–145)
SP GR UR REFRACTOMETRY: 1.02 (ref 1–1.03)
UROBILINOGEN UR QL STRIP.AUTO: 1 EU/DL (ref 0.2–1)
WBC # BLD AUTO: 9.6 K/UL (ref 4.6–13.2)
WBC URNS QL MICRO: ABNORMAL /HPF (ref 0–4)

## 2020-03-09 PROCEDURE — 81001 URINALYSIS AUTO W/SCOPE: CPT

## 2020-03-09 PROCEDURE — 76705 ECHO EXAM OF ABDOMEN: CPT

## 2020-03-09 PROCEDURE — 85025 COMPLETE CBC W/AUTO DIFF WBC: CPT

## 2020-03-09 PROCEDURE — 83690 ASSAY OF LIPASE: CPT

## 2020-03-09 PROCEDURE — 99283 EMERGENCY DEPT VISIT LOW MDM: CPT

## 2020-03-09 PROCEDURE — 80053 COMPREHEN METABOLIC PANEL: CPT

## 2020-03-09 PROCEDURE — 81025 URINE PREGNANCY TEST: CPT

## 2020-03-09 RX ORDER — ONDANSETRON 4 MG/1
8 TABLET, FILM COATED ORAL
Qty: 12 TAB | Refills: 0 | Status: SHIPPED | OUTPATIENT
Start: 2020-03-09 | End: 2020-12-30

## 2020-03-09 NOTE — ED PROVIDER NOTES
EMERGENCY DEPARTMENT HISTORY AND PHYSICAL EXAM    Date: 3/9/2020  Patient Name: Hiro Morrell    History of Presenting Illness     Chief Complaint   Patient presents with    Abdominal Pain    Back Pain         History Provided By: Patient      Additional History (Context): Hiro Morrell is a 21 y.o. female with obesity and asthma who presents with upper abdominal pain nausea vomiting that woke her from her sleep this morning. Pain radiated into her back. Eating pretzels and has a bag of peanut M&Ms with her. Denies diarrheal stools fever dysuria or blood in her urine or stools. Prior surgical history includes a . PCP: None    Current Outpatient Medications   Medication Sig Dispense Refill    ondansetron hcl (ZOFRAN) 4 mg tablet Take 2 Tabs by mouth every eight (8) hours as needed for Nausea. 12 Tab 0    prenatal multivit-ca-min-fe-fa (PRENATAL VITAMIN) tab Take 1 Tab by mouth daily. 30 Tab 0    albuterol (PROVENTIL HFA, VENTOLIN HFA, PROAIR HFA) 90 mcg/actuation inhaler Take 2 Puffs by inhalation every four (4) hours as needed for Wheezing. 1 Inhaler 1    Camphor-Eucalyptus Oil-Menthol (VICKS VAPORUB) 4.8-1.2-2.6 % oint 1 Actuation(s) by Apply Externally route three (3) times daily as needed. 50 g 0    dextromethorphan-guaiFENesin (ROBITUSSIN-DM)  mg/5 mL syrup Take 10 mL by mouth every six (6) hours as needed for Cough. 240 mL 0    dextromethorphan-guaiFENesin (CORICIDIN HBP)  mg cap Take 1 Cap by mouth two (2) times daily as needed. 20 Cap 0    albuterol (PROVENTIL HFA, VENTOLIN HFA, PROAIR HFA) 90 mcg/actuation inhaler Take 1 Puff by inhalation every four (4) hours as needed for Wheezing. 1 Inhaler 0    predniSONE (DELTASONE) 50 mg tablet Take 1 Tab by mouth daily. 4 Tab 0    ibuprofen (MOTRIN) 800 mg tablet Take 1 Tab by mouth every eight (8) hours as needed.  60 Tab 3       Past History     Past Medical History:  Past Medical History:   Diagnosis Date    Asthma     last attack years ago       Past Surgical History:  History reviewed. No pertinent surgical history. Family History:  Family History   Problem Relation Age of Onset    No Known Problems Mother        Social History:  Social History     Tobacco Use    Smoking status: Never Smoker    Smokeless tobacco: Never Used   Substance Use Topics    Alcohol use: No    Drug use: Yes       Allergies: Allergies   Allergen Reactions    Percocet [Oxycodone-Acetaminophen] Swelling     Pt can not remember if her mom told her  percocet or penicillin allergy so she does not take either. patient reports tylenol makes her eyes swell    Tylenol [Acetaminophen] Anaphylaxis    Penicillins Other (comments)         Review of Systems   Review of Systems   Constitutional: Negative for fever. Respiratory: Negative for shortness of breath. Cardiovascular: Negative for chest pain. Gastrointestinal: Positive for abdominal pain, nausea and vomiting. Genitourinary: Positive for flank pain. Musculoskeletal: Negative for back pain. All Other Systems Negative  Physical Exam     Vitals:    03/09/20 0812   BP: 115/66   Pulse: 90   Resp: 12   Temp: 99 °F (37.2 °C)   SpO2: 97%   Weight: 113.4 kg (250 lb)   Height: 5' (1.524 m)     Physical Exam  Vitals signs and nursing note reviewed. Constitutional:       Appearance: She is well-developed. HENT:      Head: Normocephalic and atraumatic. Eyes:      Pupils: Pupils are equal, round, and reactive to light. Neck:      Thyroid: No thyromegaly. Vascular: No JVD. Trachea: No tracheal deviation. Cardiovascular:      Rate and Rhythm: Normal rate and regular rhythm. Heart sounds: Normal heart sounds. No murmur. No friction rub. No gallop. Pulmonary:      Effort: Pulmonary effort is normal. No respiratory distress. Breath sounds: Normal breath sounds. No stridor. No wheezing or rales. Chest:      Chest wall: No tenderness.    Abdominal:      General: There is no distension. Palpations: Abdomen is soft. There is no mass. Tenderness: There is abdominal tenderness in the right upper quadrant. There is no guarding or rebound. Musculoskeletal:         General: No tenderness. Lymphadenopathy:      Cervical: No cervical adenopathy. Skin:     General: Skin is warm and dry. Coloration: Skin is not pale. Findings: No erythema or rash. Neurological:      Mental Status: She is alert and oriented to person, place, and time. Psychiatric:         Behavior: Behavior normal.         Thought Content:  Thought content normal.              Diagnostic Study Results     Labs -     Recent Results (from the past 12 hour(s))   URINALYSIS W/ RFLX MICROSCOPIC    Collection Time: 03/09/20  8:15 AM   Result Value Ref Range    Color YELLOW      Appearance CLEAR      Specific gravity 1.018 1.005 - 1.030      pH (UA) 6.0 5.0 - 8.0      Protein 30 (A) NEG mg/dL    Glucose NEGATIVE  NEG mg/dL    Ketone NEGATIVE  NEG mg/dL    Bilirubin NEGATIVE  NEG      Blood NEGATIVE  NEG      Urobilinogen 1.0 0.2 - 1.0 EU/dL    Nitrites NEGATIVE  NEG      Leukocyte Esterase NEGATIVE  NEG     URINE MICROSCOPIC ONLY    Collection Time: 03/09/20  8:15 AM   Result Value Ref Range    WBC 0 to 1 0 - 4 /hpf    RBC 1 to 2 0 - 5 /hpf    Epithelial cells 1+ 0 - 5 /lpf    Bacteria FEW (A) NEG /hpf    Hyaline cast 0 to 1 0 - 2 /lpf   HCG URINE, QL    Collection Time: 03/09/20  8:15 AM   Result Value Ref Range    HCG urine, QL NEGATIVE  NEG     METABOLIC PANEL, COMPREHENSIVE    Collection Time: 03/09/20  8:20 AM   Result Value Ref Range    Sodium 137 136 - 145 mmol/L    Potassium 4.4 3.5 - 5.5 mmol/L    Chloride 107 100 - 111 mmol/L    CO2 26 21 - 32 mmol/L    Anion gap 4 3.0 - 18 mmol/L    Glucose 147 (H) 74 - 99 mg/dL    BUN 13 7.0 - 18 MG/DL    Creatinine 0.84 0.6 - 1.3 MG/DL    BUN/Creatinine ratio 15 12 - 20      GFR est AA >60 >60 ml/min/1.73m2    GFR est non-AA >60 >60 ml/min/1.73m2    Calcium 9.0 8.5 - 10.1 MG/DL    Bilirubin, total 0.5 0.2 - 1.0 MG/DL    ALT (SGPT) 63 (H) 13 - 56 U/L    AST (SGOT) 124 (H) 10 - 38 U/L    Alk. phosphatase 127 (H) 45 - 117 U/L    Protein, total 8.1 6.4 - 8.2 g/dL    Albumin 3.3 (L) 3.4 - 5.0 g/dL    Globulin 4.8 (H) 2.0 - 4.0 g/dL    A-G Ratio 0.7 (L) 0.8 - 1.7     CBC WITH AUTOMATED DIFF    Collection Time: 03/09/20  8:20 AM   Result Value Ref Range    WBC 9.6 4.6 - 13.2 K/uL    RBC 5.26 4.20 - 5.30 M/uL    HGB 14.1 12.0 - 16.0 g/dL    HCT 42.4 35.0 - 45.0 %    MCV 80.6 74.0 - 97.0 FL    MCH 26.8 24.0 - 34.0 PG    MCHC 33.3 31.0 - 37.0 g/dL    RDW 14.3 11.6 - 14.5 %    PLATELET 974 (H) 895 - 420 K/uL    MPV 9.3 9.2 - 11.8 FL    NEUTROPHILS 75 (H) 40 - 73 %    LYMPHOCYTES 17 (L) 21 - 52 %    MONOCYTES 6 3 - 10 %    EOSINOPHILS 2 0 - 5 %    BASOPHILS 0 0 - 2 %    ABS. NEUTROPHILS 7.2 1.8 - 8.0 K/UL    ABS. LYMPHOCYTES 1.6 0.9 - 3.6 K/UL    ABS. MONOCYTES 0.6 0.05 - 1.2 K/UL    ABS. EOSINOPHILS 0.1 0.0 - 0.4 K/UL    ABS. BASOPHILS 0.0 0.0 - 0.1 K/UL    DF AUTOMATED     LIPASE    Collection Time: 03/09/20  8:20 AM   Result Value Ref Range    Lipase 82 73 - 393 U/L       Radiologic Studies -   US ABD LTD   Final Result   IMPRESSION:      Cholelithiasis without evidence of cholecystitis. -Borderline prominent common bile duct caliber. Recommend correlation with   biliary and times. Slightly increased hepatic echotexture suggestive of steatosis. CT Results  (Last 48 hours)    None        CXR Results  (Last 48 hours)    None            Medical Decision Making   I am the first provider for this patient. I reviewed the vital signs, available nursing notes, past medical history, past surgical history, family history and social history. Vital Signs-Reviewed the patient's vital signs. Procedures:  Procedures    Provider Notes (Medical Decision Making):  Pt has RUQ TTP on exam, is large bodied. US ordered.    Gallstones noted on ultrasound; patient has right upper quadrant pain intermittently and nausea but no vomiting. Refer her to GI as well as general surgery for her biliary colic. Give low-fat gallbladder dietary instructions. MED RECONCILIATION:  No current facility-administered medications for this encounter. Current Outpatient Medications   Medication Sig    ondansetron hcl (ZOFRAN) 4 mg tablet Take 2 Tabs by mouth every eight (8) hours as needed for Nausea.  prenatal multivit-ca-min-fe-fa (PRENATAL VITAMIN) tab Take 1 Tab by mouth daily.  albuterol (PROVENTIL HFA, VENTOLIN HFA, PROAIR HFA) 90 mcg/actuation inhaler Take 2 Puffs by inhalation every four (4) hours as needed for Wheezing.  Camphor-Eucalyptus Oil-Menthol (VICKS VAPORUB) 4.8-1.2-2.6 % oint 1 Actuation(s) by Apply Externally route three (3) times daily as needed.  dextromethorphan-guaiFENesin (ROBITUSSIN-DM)  mg/5 mL syrup Take 10 mL by mouth every six (6) hours as needed for Cough.  dextromethorphan-guaiFENesin (CORICIDIN HBP)  mg cap Take 1 Cap by mouth two (2) times daily as needed.  albuterol (PROVENTIL HFA, VENTOLIN HFA, PROAIR HFA) 90 mcg/actuation inhaler Take 1 Puff by inhalation every four (4) hours as needed for Wheezing.  predniSONE (DELTASONE) 50 mg tablet Take 1 Tab by mouth daily.  ibuprofen (MOTRIN) 800 mg tablet Take 1 Tab by mouth every eight (8) hours as needed. Disposition:  home    DISCHARGE NOTE:   1:58 PM    Pt has been reexamined. Patient has no new complaints, changes, or physical findings. Care plan outlined and precautions discussed. Results of labs, US were reviewed with the patient. All medications were reviewed with the patient; will d/c home with zofran. All of pt's questions and concerns were addressed. Patient was instructed and agrees to follow up with GI, GS, PCP, as well as to return to the ED upon further deterioration. Patient is ready to go home.     Follow-up Information     Follow up With Specialties Details Why [address]  On 5/13/2020 Primary Care Physician at 2:45 pm  [address] [facility]  [phone]    Mark Cleveland MD Gastroenterology Schedule an appointment as soon as possible for a visit in 2 days  [facility]      Elver Irwin MD General Surgery Schedule an appointment as soon as possible for a visit in 2 days  Jesus Pickens [address] [facility] DEPT Emergency Medicine  If symptoms worsen return immediately [address]  [phone]          Current Discharge Medication List      START taking these medications    Details   ondansetron hcl (ZOFRAN) 4 mg tablet Take 2 Tabs by mouth every eight (8) hours as needed for Nausea. Qty: 12 Tab, Refills: 0               Diagnosis     Clinical Impression:   1. Gallstones    2. Biliary colic    3.  Abnormal LFTs

## 2020-03-09 NOTE — LETTER
NOTIFICATION RETURN TO WORK / SCHOOL 
 
3/9/2020 2:14 PM 
 
Ms. Lionel Mcardle [address] [place] [address] To Whom It May Concern: 
 
Lionel Mcardle is currently under the care of [facility] EM[facility]. She will return to work/school on: 3/11/20. If there are questions or concerns please have the patient contact our office.  
 
 
 
Sincerely, 
 
 
Eielen Martínez PA-C

## 2020-03-09 NOTE — DISCHARGE INSTRUCTIONS
Patient Education        Low-Fat Diet for Gallbladder Disease: Care Instructions  Your Care Instructions    When you eat, the gallbladder releases bile, which helps you digest the fat in food. If you have an inflamed gallbladder, this may cause pain. A low-fat diet may give your gallbladder a rest so you can start to heal. Your doctor and dietitian can help you make an eating plan that does not irritate your digestive system. Always talk with your doctor or dietitian before you make changes in your diet. Follow-up care is a key part of your treatment and safety. Be sure to make and go to all appointments, and call your doctor if you are having problems. It's also a good idea to know your test results and keep a list of the medicines you take. How can you care for yourself at home? · Eat many small meals and snacks each day instead of three large meals. · Choose lean meats. ? Eat no more than 5 to 6½ ounces of meat a day. ? Cut off all fat you can see. ? Eat chicken and turkey without the skin. ? Many types of fish, such as salmon, lake trout, tuna, and herring, provide healthy omega-3 fat. But, avoid fish canned in oil, such as sardines in olive oil. ? Bake, broil, or grill meats, poultry, or fish instead of frying them in butter or fat. · Drink or eat nonfat or low-fat milk, yogurt, cheese, or other milk products each day. ? Read the labels on cheeses, and choose those with less than 5 grams of fat an ounce. ? Try fat-free sour cream, cream cheese, or yogurt. ? Avoid cream soups and cream sauces on pasta. ? Eat low-fat ice cream, frozen yogurt, or sorbet. Avoid regular ice cream.  · Eat whole-grain cereals, breads, crackers, rice, or pasta. Avoid high-fat foods such as croissants, scones, biscuits, waffles, doughnuts, muffins, granola, and high-fat breads. · Flavor your foods with herbs and spices (such as basil, tarragon, or mint), fat-free sauces, or lemon juice instead of butter.  You can also use butter substitutes, fat-free mayonnaise, or fat-free dressing. · Try applesauce, prune puree, or mashed bananas to replace some or all of the fat when you bake. · Limit fats and oils, such as butter, margarine, mayonnaise, and salad dressing, to no more than 1 tablespoon a meal.  · Avoid high-fat foods, such as:  ? Chocolate, whole milk, ice cream, and processed cheese. ? Fried or buttered foods. ? Sausage, salami, and cabrera. ? Cinnamon rolls, cakes, pies, cookies, and other pastries. ? Prepared snack foods, such as potato chips, nut and granola bars, and mixed nuts. ? Coconut and avocado. · Learn how to read food labels for serving sizes and ingredients. Fast-food and convenience-food meals often have lots of fat. Where can you learn more? Go to http://dia-rony.info/. Enter B855 in the search box to learn more about \"Low-Fat Diet for Gallbladder Disease: Care Instructions. \"  Current as of: November 7, 2018  Content Version: 12.2  © 5961-3184 [employer], [employer]. Care instructions adapted under license by [employer] (which disclaims liability or warranty for this information). If you have questions about a medical condition or this instruction, always ask your healthcare professional. [place] [address] any warranty or liability for your use of this information.

## 2020-03-09 NOTE — ED NOTES
noticed in patient's chart patient was insured without an PCP.  went into RW-8 to assess patient. Patient was able to provide Insurance card.  confirmed through registrar patient's insurance is active.  was able to speak to patient about the duties of an [facility] and help patient getting Primary Care follow-up.  was able to help patient be scheduled to see an PCP. Due to patient being a new patient the earliest appointment  received is 5/13/2020 at [facility] at 1:45 pm.      handed patient appointment reminder letter and 's contact information.

## 2021-07-05 ENCOUNTER — ANESTHESIA EVENT (OUTPATIENT)
Dept: SURGERY | Age: 26
End: 2021-07-05
Payer: MEDICAID

## 2021-07-05 ENCOUNTER — ANESTHESIA (OUTPATIENT)
Dept: SURGERY | Age: 26
End: 2021-07-05
Payer: MEDICAID

## 2021-07-05 ENCOUNTER — APPOINTMENT (OUTPATIENT)
Dept: GENERAL RADIOLOGY | Age: 26
End: 2021-07-05
Attending: PHYSICIAN ASSISTANT
Payer: MEDICAID

## 2021-07-05 ENCOUNTER — HOSPITAL ENCOUNTER (OUTPATIENT)
Age: 26
Setting detail: OBSERVATION
Discharge: HOME | End: 2021-07-06
Attending: EMERGENCY MEDICINE | Admitting: ORTHOPAEDIC SURGERY
Payer: MEDICAID

## 2021-07-05 DIAGNOSIS — L03.012 PARONYCHIA OF FINGER OF LEFT HAND: ICD-10-CM

## 2021-07-05 DIAGNOSIS — L03.012 CELLULITIS OF LEFT THUMB: ICD-10-CM

## 2021-07-05 DIAGNOSIS — L03.011 FELON OF FINGER OF RIGHT HAND: Primary | ICD-10-CM

## 2021-07-05 PROBLEM — L02.519 CELLULITIS AND ABSCESS OF HAND: Status: ACTIVE | Noted: 2021-07-05

## 2021-07-05 PROBLEM — L03.119 CELLULITIS AND ABSCESS OF HAND: Status: ACTIVE | Noted: 2021-07-05

## 2021-07-05 LAB
ALBUMIN SERPL-MCNC: 4 G/DL (ref 3.4–5)
ALBUMIN/GLOB SERPL: 1 {RATIO} (ref 0.8–1.7)
ALP SERPL-CCNC: 110 U/L (ref 45–117)
ALT SERPL-CCNC: 41 U/L (ref 13–56)
ANION GAP SERPL CALC-SCNC: 5 MMOL/L (ref 3–18)
APTT PPP: 28.7 SEC (ref 23–36.4)
AST SERPL-CCNC: 27 U/L (ref 10–38)
BASOPHILS # BLD: 0 K/UL (ref 0–0.1)
BASOPHILS NFR BLD: 1 % (ref 0–2)
BILIRUB SERPL-MCNC: 0.4 MG/DL (ref 0.2–1)
BUN SERPL-MCNC: 17 MG/DL (ref 7–18)
BUN/CREAT SERPL: 21 (ref 12–20)
CALCIUM SERPL-MCNC: 9.2 MG/DL (ref 8.5–10.1)
CHLORIDE SERPL-SCNC: 108 MMOL/L (ref 100–111)
CO2 SERPL-SCNC: 25 MMOL/L (ref 21–32)
CREAT SERPL-MCNC: 0.81 MG/DL (ref 0.6–1.3)
DIFFERENTIAL METHOD BLD: ABNORMAL
EOSINOPHIL # BLD: 0 K/UL (ref 0–0.4)
EOSINOPHIL NFR BLD: 0 % (ref 0–5)
ERYTHROCYTE [DISTWIDTH] IN BLOOD BY AUTOMATED COUNT: 15.7 % (ref 11.6–14.5)
GLOBULIN SER CALC-MCNC: 4.2 G/DL (ref 2–4)
GLUCOSE SERPL-MCNC: 144 MG/DL (ref 74–99)
HCG UR QL: NEGATIVE
HCT VFR BLD AUTO: 32 % (ref 35–45)
HGB BLD-MCNC: 10.7 G/DL (ref 12–16)
INR PPP: 0.9 (ref 0.8–1.2)
LACTATE BLD-SCNC: 1.71 MMOL/L (ref 0.4–2)
LYMPHOCYTES # BLD: 0.8 K/UL (ref 0.9–3.6)
LYMPHOCYTES NFR BLD: 28 % (ref 21–52)
MCH RBC QN AUTO: 29.8 PG (ref 24–34)
MCHC RBC AUTO-ENTMCNC: 33.4 G/DL (ref 31–37)
MCV RBC AUTO: 89.1 FL (ref 74–97)
MONOCYTES # BLD: 0.2 K/UL (ref 0.05–1.2)
MONOCYTES NFR BLD: 7 % (ref 3–10)
NEUTS SEG # BLD: 1.9 K/UL (ref 1.8–8)
NEUTS SEG NFR BLD: 63 % (ref 40–73)
PLATELET # BLD AUTO: 222 K/UL (ref 135–420)
PMV BLD AUTO: 9.6 FL (ref 9.2–11.8)
POTASSIUM SERPL-SCNC: 4 MMOL/L (ref 3.5–5.5)
PROT SERPL-MCNC: 8.2 G/DL (ref 6.4–8.2)
PROTHROMBIN TIME: 12.5 SEC (ref 11.5–15.2)
RBC # BLD AUTO: 3.59 M/UL (ref 4.2–5.3)
SODIUM SERPL-SCNC: 138 MMOL/L (ref 136–145)
WBC # BLD AUTO: 3 K/UL (ref 4.6–13.2)

## 2021-07-05 PROCEDURE — 26011 DRAINAGE OF FINGER ABSCESS: CPT | Performed by: ORTHOPAEDIC SURGERY

## 2021-07-05 PROCEDURE — 74011250636 HC RX REV CODE- 250/636: Performed by: ORTHOPAEDIC SURGERY

## 2021-07-05 PROCEDURE — 96372 THER/PROPH/DIAG INJ SC/IM: CPT

## 2021-07-05 PROCEDURE — 87077 CULTURE AEROBIC IDENTIFY: CPT

## 2021-07-05 PROCEDURE — 74011250636 HC RX REV CODE- 250/636: Performed by: NURSE ANESTHETIST, CERTIFIED REGISTERED

## 2021-07-05 PROCEDURE — 99223 1ST HOSP IP/OBS HIGH 75: CPT | Performed by: INTERNAL MEDICINE

## 2021-07-05 PROCEDURE — 00400 ANES INTEGUMENTARY SYS NOS: CPT | Performed by: NURSE ANESTHETIST, CERTIFIED REGISTERED

## 2021-07-05 PROCEDURE — 77030019895 HC PCKNG STRP IODO -A: Performed by: ORTHOPAEDIC SURGERY

## 2021-07-05 PROCEDURE — 87075 CULTR BACTERIA EXCEPT BLOOD: CPT

## 2021-07-05 PROCEDURE — 76060000032 HC ANESTHESIA 0.5 TO 1 HR: Performed by: ORTHOPAEDIC SURGERY

## 2021-07-05 PROCEDURE — 74011000250 HC RX REV CODE- 250: Performed by: ORTHOPAEDIC SURGERY

## 2021-07-05 PROCEDURE — 26080 EXPLORE/TREAT FINGER JOINT: CPT | Performed by: ORTHOPAEDIC SURGERY

## 2021-07-05 PROCEDURE — 99218 HC RM OBSERVATION: CPT

## 2021-07-05 PROCEDURE — 87206 SMEAR FLUORESCENT/ACID STAI: CPT

## 2021-07-05 PROCEDURE — 96374 THER/PROPH/DIAG INJ IV PUSH: CPT

## 2021-07-05 PROCEDURE — 74011000272 HC RX REV CODE- 272: Performed by: ORTHOPAEDIC SURGERY

## 2021-07-05 PROCEDURE — 2709999900 HC NON-CHARGEABLE SUPPLY: Performed by: ORTHOPAEDIC SURGERY

## 2021-07-05 PROCEDURE — 73130 X-RAY EXAM OF HAND: CPT

## 2021-07-05 PROCEDURE — 93005 ELECTROCARDIOGRAM TRACING: CPT

## 2021-07-05 PROCEDURE — 87186 SC STD MICRODIL/AGAR DIL: CPT

## 2021-07-05 PROCEDURE — 77030013479 HC CUF TRNQ COT DGT MARM -A: Performed by: ORTHOPAEDIC SURGERY

## 2021-07-05 PROCEDURE — 74011250636 HC RX REV CODE- 250/636: Performed by: EMERGENCY MEDICINE

## 2021-07-05 PROCEDURE — 99223 1ST HOSP IP/OBS HIGH 75: CPT | Performed by: ORTHOPAEDIC SURGERY

## 2021-07-05 PROCEDURE — 85730 THROMBOPLASTIN TIME PARTIAL: CPT

## 2021-07-05 PROCEDURE — 2709999900 HC NON-CHARGEABLE SUPPLY

## 2021-07-05 PROCEDURE — 00400 ANES INTEGUMENTARY SYS NOS: CPT | Performed by: ANESTHESIOLOGY

## 2021-07-05 PROCEDURE — 80053 COMPREHEN METABOLIC PANEL: CPT

## 2021-07-05 PROCEDURE — 83605 ASSAY OF LACTIC ACID: CPT

## 2021-07-05 PROCEDURE — 74011250636 HC RX REV CODE- 250/636: Performed by: INTERNAL MEDICINE

## 2021-07-05 PROCEDURE — 74011250637 HC RX REV CODE- 250/637: Performed by: PHYSICIAN ASSISTANT

## 2021-07-05 PROCEDURE — 76210000006 HC OR PH I REC 0.5 TO 1 HR: Performed by: ORTHOPAEDIC SURGERY

## 2021-07-05 PROCEDURE — 76010000138 HC OR TIME 0.5 TO 1 HR: Performed by: ORTHOPAEDIC SURGERY

## 2021-07-05 PROCEDURE — 74011000250 HC RX REV CODE- 250: Performed by: NURSE ANESTHETIST, CERTIFIED REGISTERED

## 2021-07-05 PROCEDURE — 85025 COMPLETE CBC W/AUTO DIFF WBC: CPT

## 2021-07-05 PROCEDURE — 85610 PROTHROMBIN TIME: CPT

## 2021-07-05 PROCEDURE — 99284 EMERGENCY DEPT VISIT MOD MDM: CPT

## 2021-07-05 PROCEDURE — 87205 SMEAR GRAM STAIN: CPT

## 2021-07-05 PROCEDURE — 81025 URINE PREGNANCY TEST: CPT

## 2021-07-05 RX ORDER — SODIUM CHLORIDE, SODIUM LACTATE, POTASSIUM CHLORIDE, CALCIUM CHLORIDE 600; 310; 30; 20 MG/100ML; MG/100ML; MG/100ML; MG/100ML
INJECTION, SOLUTION INTRAVENOUS
Status: DISCONTINUED | OUTPATIENT
Start: 2021-07-05 | End: 2021-07-05 | Stop reason: HOSPADM

## 2021-07-05 RX ORDER — CEFAZOLIN SODIUM 1 G/3ML
INJECTION, POWDER, FOR SOLUTION INTRAMUSCULAR; INTRAVENOUS AS NEEDED
Status: DISCONTINUED | OUTPATIENT
Start: 2021-07-05 | End: 2021-07-05 | Stop reason: HOSPADM

## 2021-07-05 RX ORDER — NALOXONE HYDROCHLORIDE 0.4 MG/ML
0.1 INJECTION, SOLUTION INTRAMUSCULAR; INTRAVENOUS; SUBCUTANEOUS AS NEEDED
Status: DISCONTINUED | OUTPATIENT
Start: 2021-07-05 | End: 2021-07-05 | Stop reason: HOSPADM

## 2021-07-05 RX ORDER — ONDANSETRON 2 MG/ML
4 INJECTION INTRAMUSCULAR; INTRAVENOUS ONCE
Status: DISCONTINUED | OUTPATIENT
Start: 2021-07-05 | End: 2021-07-05 | Stop reason: HOSPADM

## 2021-07-05 RX ORDER — POLYETHYLENE GLYCOL 3350 17 G/17G
17 POWDER, FOR SOLUTION ORAL DAILY PRN
Status: DISCONTINUED | OUTPATIENT
Start: 2021-07-05 | End: 2021-07-06 | Stop reason: HOSPADM

## 2021-07-05 RX ORDER — KETOROLAC TROMETHAMINE 15 MG/ML
INJECTION, SOLUTION INTRAMUSCULAR; INTRAVENOUS AS NEEDED
Status: DISCONTINUED | OUTPATIENT
Start: 2021-07-05 | End: 2021-07-05 | Stop reason: HOSPADM

## 2021-07-05 RX ORDER — FENTANYL CITRATE 50 UG/ML
INJECTION, SOLUTION INTRAMUSCULAR; INTRAVENOUS AS NEEDED
Status: DISCONTINUED | OUTPATIENT
Start: 2021-07-05 | End: 2021-07-05 | Stop reason: HOSPADM

## 2021-07-05 RX ORDER — MORPHINE SULFATE 4 MG/ML
4 INJECTION INTRAVENOUS
Status: COMPLETED | OUTPATIENT
Start: 2021-07-05 | End: 2021-07-05

## 2021-07-05 RX ORDER — MIDAZOLAM HYDROCHLORIDE 1 MG/ML
2 INJECTION, SOLUTION INTRAMUSCULAR; INTRAVENOUS ONCE
Status: DISCONTINUED | OUTPATIENT
Start: 2021-07-05 | End: 2021-07-05 | Stop reason: HOSPADM

## 2021-07-05 RX ORDER — ROPIVACAINE HYDROCHLORIDE 5 MG/ML
30 INJECTION, SOLUTION EPIDURAL; INFILTRATION; PERINEURAL
Status: DISCONTINUED | OUTPATIENT
Start: 2021-07-05 | End: 2021-07-05 | Stop reason: HOSPADM

## 2021-07-05 RX ORDER — DIPHENHYDRAMINE HYDROCHLORIDE 50 MG/ML
12.5 INJECTION, SOLUTION INTRAMUSCULAR; INTRAVENOUS
Status: DISCONTINUED | OUTPATIENT
Start: 2021-07-05 | End: 2021-07-05 | Stop reason: HOSPADM

## 2021-07-05 RX ORDER — MORPHINE SULFATE 4 MG/ML
2 INJECTION INTRAVENOUS
Status: DISCONTINUED | OUTPATIENT
Start: 2021-07-05 | End: 2021-07-05 | Stop reason: HOSPADM

## 2021-07-05 RX ORDER — ONDANSETRON 2 MG/ML
4 INJECTION INTRAMUSCULAR; INTRAVENOUS
Status: DISCONTINUED | OUTPATIENT
Start: 2021-07-05 | End: 2021-07-06 | Stop reason: HOSPADM

## 2021-07-05 RX ORDER — ENOXAPARIN SODIUM 100 MG/ML
40 INJECTION SUBCUTANEOUS DAILY
Status: DISCONTINUED | OUTPATIENT
Start: 2021-07-06 | End: 2021-07-06 | Stop reason: HOSPADM

## 2021-07-05 RX ORDER — SODIUM CHLORIDE 0.9 % (FLUSH) 0.9 %
5-40 SYRINGE (ML) INJECTION EVERY 8 HOURS
Status: DISCONTINUED | OUTPATIENT
Start: 2021-07-05 | End: 2021-07-05 | Stop reason: HOSPADM

## 2021-07-05 RX ORDER — FENTANYL CITRATE 50 UG/ML
50 INJECTION, SOLUTION INTRAMUSCULAR; INTRAVENOUS
Status: DISCONTINUED | OUTPATIENT
Start: 2021-07-05 | End: 2021-07-05 | Stop reason: HOSPADM

## 2021-07-05 RX ORDER — BUPIVACAINE HYDROCHLORIDE 5 MG/ML
INJECTION, SOLUTION EPIDURAL; INTRACAUDAL AS NEEDED
Status: DISCONTINUED | OUTPATIENT
Start: 2021-07-05 | End: 2021-07-05 | Stop reason: HOSPADM

## 2021-07-05 RX ORDER — IPRATROPIUM BROMIDE AND ALBUTEROL SULFATE 2.5; .5 MG/3ML; MG/3ML
3 SOLUTION RESPIRATORY (INHALATION)
Status: DISCONTINUED | OUTPATIENT
Start: 2021-07-05 | End: 2021-07-06 | Stop reason: HOSPADM

## 2021-07-05 RX ORDER — TRAMADOL HYDROCHLORIDE 50 MG/1
100 TABLET ORAL
Qty: 30 TABLET | Refills: 0 | Status: SHIPPED | OUTPATIENT
Start: 2021-07-05 | End: 2021-07-07 | Stop reason: SDUPTHER

## 2021-07-05 RX ORDER — FENTANYL CITRATE 50 UG/ML
50 INJECTION, SOLUTION INTRAMUSCULAR; INTRAVENOUS AS NEEDED
Status: DISCONTINUED | OUTPATIENT
Start: 2021-07-05 | End: 2021-07-05 | Stop reason: HOSPADM

## 2021-07-05 RX ORDER — SODIUM CHLORIDE, SODIUM LACTATE, POTASSIUM CHLORIDE, CALCIUM CHLORIDE 600; 310; 30; 20 MG/100ML; MG/100ML; MG/100ML; MG/100ML
50 INJECTION, SOLUTION INTRAVENOUS CONTINUOUS
Status: DISCONTINUED | OUTPATIENT
Start: 2021-07-05 | End: 2021-07-06 | Stop reason: HOSPADM

## 2021-07-05 RX ORDER — DEXAMETHASONE SODIUM PHOSPHATE 4 MG/ML
INJECTION, SOLUTION INTRA-ARTICULAR; INTRALESIONAL; INTRAMUSCULAR; INTRAVENOUS; SOFT TISSUE AS NEEDED
Status: DISCONTINUED | OUTPATIENT
Start: 2021-07-05 | End: 2021-07-05 | Stop reason: HOSPADM

## 2021-07-05 RX ORDER — IBUPROFEN 400 MG/1
800 TABLET ORAL
Status: DISCONTINUED | OUTPATIENT
Start: 2021-07-05 | End: 2021-07-06 | Stop reason: HOSPADM

## 2021-07-05 RX ORDER — FENTANYL CITRATE 50 UG/ML
100 INJECTION, SOLUTION INTRAMUSCULAR; INTRAVENOUS ONCE
Status: DISCONTINUED | OUTPATIENT
Start: 2021-07-05 | End: 2021-07-05 | Stop reason: HOSPADM

## 2021-07-05 RX ORDER — SODIUM CHLORIDE 0.9 % (FLUSH) 0.9 %
5-40 SYRINGE (ML) INJECTION AS NEEDED
Status: DISCONTINUED | OUTPATIENT
Start: 2021-07-05 | End: 2021-07-06 | Stop reason: HOSPADM

## 2021-07-05 RX ORDER — PROPOFOL 10 MG/ML
INJECTION, EMULSION INTRAVENOUS AS NEEDED
Status: DISCONTINUED | OUTPATIENT
Start: 2021-07-05 | End: 2021-07-05 | Stop reason: HOSPADM

## 2021-07-05 RX ORDER — MIDAZOLAM HYDROCHLORIDE 1 MG/ML
INJECTION, SOLUTION INTRAMUSCULAR; INTRAVENOUS AS NEEDED
Status: DISCONTINUED | OUTPATIENT
Start: 2021-07-05 | End: 2021-07-05 | Stop reason: HOSPADM

## 2021-07-05 RX ORDER — TRAMADOL HYDROCHLORIDE 50 MG/1
100 TABLET ORAL
Status: DISCONTINUED | OUTPATIENT
Start: 2021-07-05 | End: 2021-07-06 | Stop reason: HOSPADM

## 2021-07-05 RX ORDER — MORPHINE SULFATE 4 MG/ML
4 INJECTION INTRAVENOUS
Status: DISCONTINUED | OUTPATIENT
Start: 2021-07-05 | End: 2021-07-05 | Stop reason: SDUPTHER

## 2021-07-05 RX ORDER — SODIUM CHLORIDE 0.9 % (FLUSH) 0.9 %
5-40 SYRINGE (ML) INJECTION AS NEEDED
Status: DISCONTINUED | OUTPATIENT
Start: 2021-07-05 | End: 2021-07-05 | Stop reason: HOSPADM

## 2021-07-05 RX ORDER — TRAMADOL HYDROCHLORIDE 50 MG/1
50 TABLET ORAL
Status: COMPLETED | OUTPATIENT
Start: 2021-07-05 | End: 2021-07-05

## 2021-07-05 RX ORDER — TRAMADOL HYDROCHLORIDE 50 MG/1
50 TABLET ORAL
Status: DISCONTINUED | OUTPATIENT
Start: 2021-07-05 | End: 2021-07-05 | Stop reason: SDUPTHER

## 2021-07-05 RX ORDER — ONDANSETRON 4 MG/1
4 TABLET, ORALLY DISINTEGRATING ORAL
Status: DISCONTINUED | OUTPATIENT
Start: 2021-07-05 | End: 2021-07-06 | Stop reason: HOSPADM

## 2021-07-05 RX ORDER — LIDOCAINE HYDROCHLORIDE 20 MG/ML
INJECTION, SOLUTION EPIDURAL; INFILTRATION; INTRACAUDAL; PERINEURAL AS NEEDED
Status: DISCONTINUED | OUTPATIENT
Start: 2021-07-05 | End: 2021-07-05 | Stop reason: HOSPADM

## 2021-07-05 RX ORDER — SODIUM CHLORIDE 0.9 % (FLUSH) 0.9 %
5-40 SYRINGE (ML) INJECTION EVERY 8 HOURS
Status: DISCONTINUED | OUTPATIENT
Start: 2021-07-05 | End: 2021-07-06 | Stop reason: HOSPADM

## 2021-07-05 RX ORDER — SODIUM CHLORIDE, SODIUM LACTATE, POTASSIUM CHLORIDE, CALCIUM CHLORIDE 600; 310; 30; 20 MG/100ML; MG/100ML; MG/100ML; MG/100ML
50 INJECTION, SOLUTION INTRAVENOUS CONTINUOUS
Status: DISCONTINUED | OUTPATIENT
Start: 2021-07-05 | End: 2021-07-05 | Stop reason: HOSPADM

## 2021-07-05 RX ORDER — ONDANSETRON 2 MG/ML
INJECTION INTRAMUSCULAR; INTRAVENOUS AS NEEDED
Status: DISCONTINUED | OUTPATIENT
Start: 2021-07-05 | End: 2021-07-05 | Stop reason: HOSPADM

## 2021-07-05 RX ADMIN — SODIUM CHLORIDE, SODIUM LACTATE, POTASSIUM CHLORIDE, AND CALCIUM CHLORIDE: 600; 310; 30; 20 INJECTION, SOLUTION INTRAVENOUS at 19:44

## 2021-07-05 RX ADMIN — LIDOCAINE HYDROCHLORIDE 60 MG: 20 INJECTION, SOLUTION EPIDURAL; INFILTRATION; INTRACAUDAL; PERINEURAL at 19:49

## 2021-07-05 RX ADMIN — DEXAMETHASONE SODIUM PHOSPHATE 8 MG: 4 INJECTION, SOLUTION INTRAMUSCULAR; INTRAVENOUS at 20:01

## 2021-07-05 RX ADMIN — MORPHINE SULFATE 4 MG: 4 INJECTION, SOLUTION INTRAMUSCULAR; INTRAVENOUS at 18:41

## 2021-07-05 RX ADMIN — KETOROLAC TROMETHAMINE 30 MG: 15 INJECTION, SOLUTION INTRAMUSCULAR; INTRAVENOUS at 20:01

## 2021-07-05 RX ADMIN — TRAMADOL HYDROCHLORIDE 50 MG: 50 TABLET, COATED ORAL at 11:44

## 2021-07-05 RX ADMIN — MORPHINE SULFATE 4 MG: 4 INJECTION, SOLUTION INTRAMUSCULAR; INTRAVENOUS at 13:03

## 2021-07-05 RX ADMIN — Medication 10 ML: at 22:09

## 2021-07-05 RX ADMIN — CEFAZOLIN SODIUM 2 G: 1 INJECTION, POWDER, FOR SOLUTION INTRAMUSCULAR; INTRAVENOUS at 20:15

## 2021-07-05 RX ADMIN — MORPHINE SULFATE 4 MG: 4 INJECTION, SOLUTION INTRAMUSCULAR; INTRAVENOUS at 16:03

## 2021-07-05 RX ADMIN — FENTANYL CITRATE 50 MCG: 50 INJECTION, SOLUTION INTRAMUSCULAR; INTRAVENOUS at 20:00

## 2021-07-05 RX ADMIN — ONDANSETRON 4 MG: 2 INJECTION INTRAMUSCULAR; INTRAVENOUS at 20:01

## 2021-07-05 RX ADMIN — MIDAZOLAM HYDROCHLORIDE 1 MG: 2 INJECTION, SOLUTION INTRAMUSCULAR; INTRAVENOUS at 19:49

## 2021-07-05 RX ADMIN — MIDAZOLAM HYDROCHLORIDE 1 MG: 2 INJECTION, SOLUTION INTRAMUSCULAR; INTRAVENOUS at 19:44

## 2021-07-05 RX ADMIN — FENTANYL CITRATE 50 MCG: 50 INJECTION, SOLUTION INTRAMUSCULAR; INTRAVENOUS at 19:55

## 2021-07-05 RX ADMIN — PROPOFOL 150 MG: 10 INJECTION, EMULSION INTRAVENOUS at 19:49

## 2021-07-05 RX ADMIN — SODIUM CHLORIDE, SODIUM LACTATE, POTASSIUM CHLORIDE, AND CALCIUM CHLORIDE 50 ML/HR: 600; 310; 30; 20 INJECTION, SOLUTION INTRAVENOUS at 16:03

## 2021-07-05 NOTE — OP NOTES
Patient: Piter Durham                MRN:@           SSN: [id number]   YOB: 1995         AGE: 31 y.o. SEX: female       OPERATIVE REPORT      Patient: Piter Durham  YOB: 1995  MRN: [id number]    Date of Procedure: 7/5/2021     Pre-Op Diagnosis: left thumb infection, paronychia, left thumb, IP joint, felon left thumb, immunosuppression    Post-Op Diagnosis: Paronychia left thumb      Procedure(s):    INCISION AND DRAINAGE LEFT THUMB:  49392  Arthrotomy left thumb IP region 14312    Surgeon(s):  Jonathan Rosales MD    Surgical Assistant: Surg Asst-1: (Unknown)    Anesthesia: General and 8 ml Marcaine 0.50% plain    Estimated Blood Loss (mL): Minimal    IV FLUIDS:  500 ml IVF  Tourniquet Time:   17 min Torniquot time minutes     Complications: None    Specimens: * No specimens in log *     Implants: * No surgical log found *    Drains: * No LDAs found *    Findings:   Large paronychia/ no Felon/no septic arthritis of the IP joint. Electronically Signed by Amina Barber MD on 7/5/2021 at 3:34 PM        OPERATIVE REPORT        DESCRIPTION OF PROCEDURE:     Patient taken to the operating room on 7/5/2021 . Regional block was not already performed to the left hand or upper extremity prior to taking to the operating room. Piter Durham was positioned lying supine. general anesthesia body was conducted. This individual, who had tenderness at the IP joint, had a paronychia, and tenderness and swelling to the volar pulp, my clinical suspicions is obvious paronychia, felon, possible septic arthritis of the IP joint. Recommendation, is for open arthrotomy, to assess this joint, prior to opening of any other contaminated portion of the thumb, as there is no drainage from the thumb of this current time and no violation of the other skin or soft tissues at this current time.   But because she is immunosuppressed, asked and because she is clinically having tenderness at the IP region, is concerned about a concomitant IP joint infection. Plan is to take her to the operating room and performed the surgery in the specific manner: Assess the IP joint prior to opening up any other infection or infected portion of her thumb. Entire LEFT UPPER EXTREMITY was fully prepped with Chlorhexidene soap and yellow prep sticks (2) . A formal verbal  time out was conducted: identifying the patient, identifying the surgical location, reading the informed written signed consent for procedure, confirmation that  the patient did receive preoperative antibiotics and that my signature on the patient was visible at the surgical field. All members of the surgical team agreed to the consent process. I did not exsanguinate the left UPPER EXTREMITY with Esmarch. I placed a turnicot, to the left thumb proximally. Because is concerned about septic arthritis, left thumb IP region, made a 1 cm horizontal incision over the left thumb IP crease my incision made through skin, through subcutaneous tissues, identified the extensor tendon. A linear arthrotomy was performed, parallel to the extensor tendon. There is no pus retrieved, from this left IP joint. This was thoroughly irrigated, and then skin closed with 4-0 nylon in a simple interrupted knot tied fashion. This surgical incision site was kept away from any distal infection site. There is no drainage, to the finger and thumb, this at this time from the paronychia. The patient has an obvious large paronychia which check she has worsened since this morning. I used a 11 blade, to undermine the skin at the skin nail interface, an abundant amount of pus was retrieved and cultured and sent for Gram stain. I then made a centimeter incision along the left thumb volar aspect, incision made through skin subcutaneous tissues. I used a hemostat to dissect into the adipose tissue of this thumb.   There is no pus, and this volar thumb tissue. Thorough lavage was performed of the felon, sharp excisional debridement of the felon was performed excising small amount of subcutaneous fat, and abscess using 15 blade, needle-nose rongeur, hemostat. This was cultured. So this was cultured before thorough irrigation was conducted. I used 8 mL of plain, half percent Marcaine plain digital block, left thumb prior to dressing placement. At this point sterile dressings were applied, I placed a small amount of small Xeroform to the paronychial region. The volar pulp incision, was left open. Sterile dressings were then placed Xeroform, 4 x 4's, 4 inch Kerlix and a soft dressing was applied to this left upper extremity. Lexus Shelby was extubated and transferred to recovery room.            Whit Teresa MD  7/5/2021  8:29 PM

## 2021-07-05 NOTE — ANESTHESIA PREPROCEDURE EVALUATION
Relevant Problems   No relevant active problems       Anesthetic History   No history of anesthetic complications            Review of Systems / Medical History  Patient summary reviewed and pertinent labs reviewed    Pulmonary            Asthma        Neuro/Psych   Within defined limits           Cardiovascular                       GI/Hepatic/Renal                Endo/Other        Obesity     Other Findings              Physical Exam    Airway  Mallampati: II  TM Distance: 4 - 6 cm  Neck ROM: normal range of motion   Mouth opening: Diminished (comment)     Cardiovascular    Rhythm: regular  Rate: normal         Dental    Dentition: Poor dentition     Pulmonary  Breath sounds clear to auscultation               Abdominal  GI exam deferred       Other Findings            Anesthetic Plan    ASA: 2  Anesthesia type: general          Induction: Intravenous  Anesthetic plan and risks discussed with: Patient

## 2021-07-05 NOTE — ED NOTES
TRANSFER - OUT REPORT:    Verbal report given to Sara Mcbride (name) on Zaki Briceno  being transferred to (unit) for routine progression of care       Report consisted of patients Situation, Background, Assessment and   Recommendations(SBAR). Information from the following report(s) SBAR, ED Summary and MAR was reviewed with the receiving nurse. Lines:   Peripheral IV 07/05/21 Right Forearm (Active)   Site Assessment Clean, dry, & intact 07/05/21 0940   Phlebitis Assessment 0 07/05/21 0940   Infiltration Assessment 0 07/05/21 0940   Dressing Status Clean, dry, & intact 07/05/21 0940   Dressing Type Transparent 07/05/21 0940   Hub Color/Line Status Patent; Flushed 07/05/21 0940       Peripheral IV 07/05/21 Right Antecubital (Active)   Site Assessment Clean, dry, & intact 07/05/21 1240   Phlebitis Assessment 0 07/05/21 1240   Infiltration Assessment 0 07/05/21 1240   Dressing Status Clean, dry, & intact 07/05/21 1240   Dressing Type Tape;Transparent 07/05/21 1240   Hub Color/Line Status Pink;Flushed;Patent 07/05/21 1240   Action Taken Blood drawn 07/05/21 1240   Alcohol Cap Used Yes 07/05/21 1240        Opportunity for questions and clarification was provided.       Patient transported with:  Pt belongings   Transported with transport via stretcher   RN given time to ask questions and get clarification   Informed oncoming nurse that pt need to get antibiotics after surgery per dr Clair Benavides informed   Informed RN that report given to preop staff

## 2021-07-05 NOTE — PROGRESS NOTES
Bedside and Verbal shift change report given to Rishi Barragan RN (oncoming nurse) by Cindy Morfin RN (offgoing nurse). Report included the following information SBAR, Kardex, Procedure Summary, Intake/Output, MAR, Recent Results and Med Rec Status.

## 2021-07-05 NOTE — ED NOTES
Pt received from results waiting   Pt ambulated with strong steady gait  Pt alert and oriented x 4   Pt c/o pain in 10/10 in left thumb  Swelling and bruising of thumb  Completed vital signs   Pt handling secretions   No trouble swallowing   Able to handle secretions   Verified pt using 2 identifiers  Explained use and side effects  Medicated as ordered  Gave water  Updated about plan of care   Placed in results waiting   Will continue to monitor and assess

## 2021-07-05 NOTE — H&P
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 Cha Gomez consulted- has een patient in ED   - to OR for drainage  NPO until surgery  Hold Abx until after surgery  Please obtain surgical cultures  CBC, BMP in am      Davis Feliciano D.O. Internal Medicine and Infectious Diseases      Subjective:    Patient is a 31 y. o.female who is being evaluated for left thumb swelling    Ms. Thony De La Rosa is a pleasant 79-year-old female with a past medical history of asthma, obesity, and relatively recently diagnosed infiltrating ductal carcinoma of the left breast undergoing chemotherapy who is now presenting with left thumb pain. She states that it has worsened over the last several days. She notes that she has increased pain and swelling with some redness. She has discoloration of her nails throughout so discoloration of the left thumbnail is not unique. She is not aware of any trauma. She is not aware of any animal bites or foreign objects. She has decreased range of motion. No systemic fevers or chills. No other particular complaints at this time      Past Medical History:   Diagnosis Date    Asthma     last attack years ago    Cancer [facility])     BREAST     Past Surgical History:   Procedure Laterality Date    HX BREAST BIOPSY      HX  SECTION  2016     Family History   Problem Relation Age of Onset    No Known Problems Mother     No Known Problems Father      Medications reviewed as below:   Current Outpatient Medications   Medication Sig Dispense Refill    OXYBUTYNIN CHLORIDE PO Take 5 mg by mouth daily.  albuterol (PROVENTIL HFA, VENTOLIN HFA, PROAIR HFA) 90 mcg/actuation inhaler Take 1 Puff by inhalation every four (4) hours as needed for Wheezing. 1 Inhaler 0     Allergies   Allergen Reactions    Percocet [Oxycodone-Acetaminophen] Swelling     Pt can not remember if her mom told her  percocet or penicillin allergy so she does not take either. patient reports tylenol makes her eyes swell. States she is not allergic to Percocet. She has never taken it.  Tylenol [Acetaminophen] Anaphylaxis    Penicillins Other (comments)     Pt. States she is not allergic Penicillins- she has never taken it.  Gadavist [Gadobutrol] Nausea Only and Other (comments)     CO NAUSEA AND HEADACHE POST INJECTION. Social History     Socioeconomic History    Marital status: SINGLE     Spouse name: Not on file    Number of children: Not on file    Years of education: Not on file    Highest education level: Not on file   Occupational History    Not on file   Tobacco Use    Smoking status: Never Smoker    Smokeless tobacco: Never Used   Substance and Sexual Activity    Alcohol use: Yes     Comment: OCASSIONAL    Drug use: Yes    Sexual activity: Yes     Birth control/protection: None   Other Topics Concern     Service Not Asked    Blood Transfusions Not Asked    Caffeine Concern Not Asked    Occupational Exposure Not Asked    Hobby Hazards Not Asked    Sleep Concern Not Asked    Stress Concern Not Asked    Weight Concern Not Asked    Special Diet Not Asked    Back Care Not Asked    Exercise Not Asked    Bike Helmet Not Asked   [address] [facility] Not Asked    Self-Exams Not Asked   Social History Narrative    Not on file     Social Determinants of Health     Financial Resource Strain:     Difficulty of Paying Living Expenses:    Food Insecurity:     Worried About Running Out of Food in the Last Year:     [address] in the Last Year:    Transportation Needs:     Lack of Transportation (Medical):      Lack of Transportation (Non-Medical):    Physical Activity:     Days of Exercise per Week:     Minutes of Exercise per Session:    Stress:     Feeling of Stress :    Social Connections:     Frequency of Communication with Friends and Family:     Frequency of Social Gatherings with Friends and Family:     Attends [employer] Services:     Active Member of Clubs or Organizations:     Attends Club or Organization Meetings:     Marital Status:    Intimate Partner Violence:     Fear of Current or Ex-Partner:     Emotionally Abused:     Physically Abused:     Sexually Abused:         Review of Systems    Negative Unless BOLDED    General: fevers, chills, myalgias, arthralgias, unexplained weight loss, malaise, fatigue. HEENT:  headaches,sinus pain or presure, recent URI, recent dental procedures;  tinnitus, hearing loss , visual changes, catarats, dizziness or blurred vision  PUlMONARY:  cough , shortness of breath, sputum production, hx of asthma or COPD. previous treatement for TB or PPD. Cardiovascular: chest pain, previous CAD/MI, vavlular heart disease,  murmurs  GI:   nausea, vomiting, diarrhea, abdominal pain, prior C.diff  :  urinary frequency, dysuria, hematuria, bladder incontinence.    Neurologic:  seizures, syncope or prior CVA/TIA, confusion, memory impairment, neuropathy  Musculoskeletal:  myalgias arthralgias, joint pain/ swelling,  back pain  Skin:  Purities,  recurrent cellulitis,  chronic stasis ulcer, diabetic foot ulcers, discoloration of fingernails  Endocrine: polyuria, polydipsia, hair loss, weight gain  Psych: Denies depression or treatment by a psychiatrist/psycologist  Heme-Onc: prior DVT, easy bruising, fatigue, malignancy        Objective:        Visit Vitals  /76 (BP 1 Location: Left upper arm)   Pulse 87   Temp 98.1 °F (36.7 °C)   Resp 16   SpO2 100%     Temp (24hrs), Av °F (36.7 °C), Min:97.9 °F (36.6 °C), Max:98.1 °F (36.7 °C)        General:   awake alert and oriented   Skin:   no rashes or skin lesions noted on limited exam; Mediport in place   HEENT:  Normocephalic, atraumatic, PERRL, EOMI, no scleral icterus or pallor; no conjunctival hemmohage;  nasal and oral mucous are moist and without evidence of lesions. No thrush. Dentition good. Neck supple, no bruits. Lymph Nodes:   no cervical, axillary or inguinal adenopathy   Lungs:   non-labored, bilaterally clear to aspiration- no crackles wheezes rales or rhonchi   Heart:  RRR, s1 and s2; no murmurs rubs or gallops, no edema, + pedal pulses   Abdomen:  soft, obese non-distended, active bowel sounds, no hepatomegaly, no splenomegaly. Non-tender   Genitourinary:  deferred   Extremities:   no clubbing, cyanosis; no joint effusions or swelling; Full ROM of all large joints to the upper and lower extremities; muscle mass appropriate for age; left thumb with associated swelling hyperpigmented with some dusky erythema, no drainage no evidence of trauma warm to touch   Neurologic:  No gross focal sensory abnormalities; 5/5 muscle strength to upper and lower extremities. Speech appropirate. Cranial nerves intact   Psychiatric:   appropriate and interactive. Labs: Results:   Chemistry Recent Labs     07/05/21  0910   *      K 4.0      CO2 25   BUN 17   CREA 0.81   CA 9.2   AGAP 5   BUCR 21*      TP 8.2   ALB 4.0   GLOB 4.2*   AGRAT 1.0      CBC w/Diff Recent Labs     07/05/21  0910   WBC 3.0*   RBC 3.59*   HGB 10.7*   HCT 32.0*      GRANS 63   LYMPH 28   EOS 0            No results found for: [facility] Lab Results   Component Value Date/Time    Culture result:  11/17/2019 02:16 PM     [id number]  COLONIES/mL  MIXED GRAM POSITIVE [place]ORD, PROBABLE SKIN/GENITAL CONTAMINATION. Culture result: POSITIVE, BETA HEMOLYTIC GROUP B STREP ISOLATED. 07/22/2016 12:25 PM    Culture result:  07/22/2016 12:25 PM     GROUP B STREPTOCOCCI REMAIN UNIVERSALLY SUSCEPTIBLE TO PENICILLIN, AMPICILLIN, AND CEFAZOLIN. RESISTANCE TO CLINDAMYCIN AND ERYTHROMYCIN CAN OCCUR.  PLEASE CONTACT LABORATORY WITHIN 48 HOURS IF ERYTHROMYCIN AND CLINDAMYCIN SUSCEPTIBILITY TESTING IS NEEDED. Imaging:      All imaging reviewed from Admission to present as per radiology interpretation in [address] [place] [address]

## 2021-07-05 NOTE — ED TRIAGE NOTES
Patient A/O x 4, presented to the ED with complaint of left thumb pain x 6/30/21. Patient denies fever, body aches, chills and other complaints. Patient has hx of breast cancer and presently receives chemotherapy.  Patient was told by oncologists that swelling to her thumb is normal.

## 2021-07-05 NOTE — ED NOTES
TRANSFER - OUT REPORT:    Verbal report given to Ayana Rashid RN (name) on Elsa Danger  being transferred to surgical (unit) for routine progression of care       Report consisted of patients Situation, Background, Assessment and   Recommendations(SBAR). Information from the following report(s) SBAR, ED Summary and MAR was reviewed with the receiving nurse. Lines:   Peripheral IV 07/05/21 Right Forearm (Active)   Site Assessment Clean, dry, & intact 07/05/21 0940   Phlebitis Assessment 0 07/05/21 0940   Infiltration Assessment 0 07/05/21 0940   Dressing Status Clean, dry, & intact 07/05/21 0940   Dressing Type Transparent 07/05/21 0940   Hub Color/Line Status Patent; Flushed 07/05/21 0940       Peripheral IV 07/05/21 Right Antecubital (Active)   Site Assessment Clean, dry, & intact 07/05/21 1240   Phlebitis Assessment 0 07/05/21 1240   Infiltration Assessment 0 07/05/21 1240   Dressing Status Clean, dry, & intact 07/05/21 1240   Dressing Type Tape;Transparent 07/05/21 1240   Hub Color/Line Status Pink;Flushed;Patent 07/05/21 1240   Action Taken Blood drawn 07/05/21 1240   Alcohol Cap Used Yes 07/05/21 1240        Opportunity for questions and clarification was provided.       Patient transported with:  Pt belongings  Pt report given to surgical team   They stated pt may still be here for hours

## 2021-07-05 NOTE — ED NOTES
Introduced self to patient  Pt laying in bed resting  Alert and oriented x 4   Verified pt using 2 identifiers  Explained use and side effects  Medicated as ordered  No swelling at IV site  No signs of pain at IV site  Flushed well  Pt positioned in bed for comfort  Updated about plan of care   Pt on monitor SR without ectopy   Updated about tranfer  Released care of pt

## 2021-07-05 NOTE — BRIEF OP NOTE
Brief Postoperative Note    Patient: Tobias Fraire  YOB: 1995  MRN: [id number]    Date of Procedure: 7/5/2021     Pre-Op Diagnosis: left thumb infection, paronychia, left thumb, IP joint, felon left thumb, immunosuppression    Post-Op Diagnosis: Paronychia left thumb      Procedure(s):    INCISION AND DRAINAGE LEFT THUMB:  97836  Arthrotomy left thumb IP region [id number]    Surgeon(s):  Virgen Mathew MD    Surgical Assistant: Surg Asst-1: (Unknown)    Anesthesia: General and 8 ml Marcaine 0.50% plain    Estimated Blood Loss (mL): Minimal    IV FLUIDS:  500 ml IVF  Tourniquet Time:   17 min Torniquot time minutes     Complications: None    Specimens: * No specimens in log *     Implants: * No surgical log found *    Drains: * No LDAs found *    Findings:   Large paronychia/ no Felon    Electronically Signed by Jose Roberto Thompson MD on 7/5/2021 at 3:34 PM

## 2021-07-05 NOTE — CONSULTS
Consult    Patient: Roshan Irving MRN: [id number]  SSN: [id number]    YOB: 1995  Age: 31 y.o. Sex: female          Pau Ro is a 31 y.o. female who is being seen for thumb pain is been going on for about 5 days now. She denies any fever shakes chills night sweats, or any recognizable trauma. She works as a araceli, at [employer], states that she may have inadvertently jammed the thumb but cannot recall any specific incident. She has a history of breast cancer she is currently receiving chemotherapy at this current time. She has a right sided central access catheter in place. There are no other complaints, changes, or physical findings at this time. Past Medical History:   Diagnosis Date    Asthma     last attack years ago   [facility])     BREAST     Past Surgical History:   Procedure Laterality Date    HX BREAST BIOPSY      HX  SECTION  2016      Family History   Problem Relation Age of Onset    No Known Problems Mother     No Known Problems Father      Social History     Tobacco Use    Smoking status: Never Smoker    Smokeless tobacco: Never Used   Substance Use Topics    Alcohol use: Yes     Comment: OCASSIONAL      Current Facility-Administered Medications   Medication Dose Route Frequency Provider Last Rate Last Admin    traMADoL (ULTRAM) tablet 50 mg  50 mg Oral NOW TRAVIS Rubalcava         Current Outpatient Medications   Medication Sig Dispense Refill    OXYBUTYNIN CHLORIDE PO Take 5 mg by mouth daily.  albuterol (PROVENTIL HFA, VENTOLIN HFA, PROAIR HFA) 90 mcg/actuation inhaler Take 1 Puff by inhalation every four (4) hours as needed for Wheezing. 1 Inhaler 0        Allergies   Allergen Reactions    Percocet [Oxycodone-Acetaminophen] Swelling     Pt can not remember if her mom told her  percocet or penicillin allergy so she does not take either. patient reports tylenol makes her eyes swell.   States she is not allergic to Percocet. She has never taken it.  Tylenol [Acetaminophen] Anaphylaxis    Penicillins Other (comments)     Pt. States she is not allergic Penicillins- she has never taken it.  Gadavist [Gadobutrol] Nausea Only and Other (comments)     CO NAUSEA AND HEADACHE POST INJECTION. Review of Systems:    Constitutional: Negative for chills and fever. Respiratory: Negative for shortness of breath. Cardiovascular: Negative for chest pain. Gastrointestinal: Negative for abdominal pain, nausea and vomiting. Genitourinary: Negative for flank pain. Musculoskeletal: Negative for back pain and myalgias. Skin: Negative for color change, pallor, rash and wound. Left thumb pain and swelling   Neurological: Negative for dizziness, weakness and light-headedness. All other systems reviewed and are negative. OBJECTIVE DATA     Vitals:    07/05/21 0841   BP: (!) 144/86   Pulse: (!) 110   Resp: 18   Temp: 97.9 °F (36.6 °C)   SpO2: 99%          Visit Vitals  /76 (BP 1 Location: Left upper arm)   Pulse 87   Temp 98.1 °F (36.7 °C)   Resp 16   SpO2 100%       Appearance: Alert, well appearing and pleasant patient who is in no distress, oriented to person, place/time, and who follows commands. This patient is accompanied in the examination room by her  self. no dementia  Psychiatric: Affect and mood are appropriate. H EENT (2): Head normocephalic & atraumatic.  Both pupils are round, non icteric sclera     Eye: EOM are intact and sclera are clear    Neck: ROM WNL       Hearings Intact, does not require hearing aid device  Respiratory: Breathing is unlabored without accessory chest muscle use  Cardiovascular/Peripheral Vascular: Normal Pulses to each  foot       Left hand:    Skin: moderate swelling to left thumb volar distal phalanx and dorsal portion at nail skin interface/swelling dorsal IP region, mils pus under the skin at dorsal paronychial region  Tender: mild to thenar crease (no redness), tender dorsal thumb IP/ volar thumb distal phalanx,   Motion: poor IP thumb motion  Deformity: swelling  Vascular: NL  Neuro: SILT left hand/ motor NL WNL to index,middle, ring, baby finger    LABORATORY DATA        CMP:   Lab Results   Component Value Date/Time     07/05/2021 09:10 AM    K 4.0 07/05/2021 09:10 AM     07/05/2021 09:10 AM    CO2 25 07/05/2021 09:10 AM    AGAP 5 07/05/2021 09:10 AM     (H) 07/05/2021 09:10 AM    BUN 17 07/05/2021 09:10 AM    CREA 0.81 07/05/2021 09:10 AM    GFRAA >60 07/05/2021 09:10 AM    GFRNA >60 07/05/2021 09:10 AM    CA 9.2 07/05/2021 09:10 AM    ALB 4.0 07/05/2021 09:10 AM    TP 8.2 07/05/2021 09:10 AM    GLOB 4.2 (H) 07/05/2021 09:10 AM    AGRAT 1.0 07/05/2021 09:10 AM    ALT 41 07/05/2021 09:10 AM     CBC:   Lab Results   Component Value Date/Time    WBC 3.0 (L) 07/05/2021 09:10 AM    HGB 10.7 (L) 07/05/2021 09:10 AM    HCT 32.0 (L) 07/05/2021 09:10 AM     07/05/2021 09:10 AM     ABG: No results found for: PH, PHI, PCO2, PCO2I, PO2, PO2I, HCO3, HCO3I, FIO2, FIO2I                    Cultures, Micro:      IMAGING     [facility]: INTERPRETATION BY RADIOLOGIST     XR Results (most recent):  Results from Hospital Encounter encounter on 07/05/21    XR HAND LT MIN 3 V    Narrative  EXAM:  XR HAND LT MIN 3 V    INDICATION:  left thumb swelling    COMPARISON: None. FINDINGS: 3  views of the left hand demonstrate no fracture or other osseous,  articular abnormality. Soft tissue swelling of the thumb, no radiopaque foreign  body identified. Impression  Soft tissue swelling of the left thumb. No radiographic finding for  an acute osseous process or radiopaque foreign body. .       IMPRESSION/ASSESSMENT     Hospital Problems  Date Reviewed: 2/19/2021    None        [unfilled]    Jarrell Britt is a 31 y.o. female who receives chemotherapy for breast cancer.     Clinically, Jarrell Britt has a felon/paronychia left thumb, but also as distinct swelling /tenderness to IP joint also. She is immunocompromised and she may have an infection on the IP joint also. Recommend doing this procedure in the OR. OR consent signed/witnessed      Plan for:    1. Open dorsal arthrotomy left thumb IP joint, I and D of this specific region, then I and D of the felon and paronychia after the dorsal IP region. PLAN       1. NPO: since last night, but she did receive pain oral medication on the ED approximately at 10 am today. 2. Covid: She has received her Mel Everett vaccination/ she has card in on her person. 3. Labs pending: PT/INR, EKG, CXR  4.  Pain medication: Morphine 4 mg IV x one.  5. OR consents signed/witnessed      Nickie Mckeon MD  7/5/2021  11:41 AM

## 2021-07-05 NOTE — ED PROVIDER NOTES
EMERGENCY DEPARTMENT HISTORY AND PHYSICAL EXAM      Date: 2021  Patient Name: Nadira Campbell    History of Presenting Illness     Chief Complaint   Patient presents with    Thumb Pain       History Provided By: Patient    HPI: Nadira Campbell, 31 y.o. female PMHx significant for asthma, breast cancer presents ambulatory to the ED. patient reports left thumb pain and swelling x5 days. Denies known trauma or injury. 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 Denies numbness/tingling, fever/chills. Patient is not take anything for symptoms. Patient states she currently receives chemotherapy for breast cancer and was told by oncologist that this could be a side effect of the medication. There are no other complaints, changes, or physical findings at this time. PCP: Arcelia Raymundo MD    No current facility-administered medications on file prior to encounter. Current Outpatient Medications on File Prior to Encounter   Medication Sig Dispense Refill    OXYBUTYNIN CHLORIDE PO Take 5 mg by mouth daily.  albuterol (PROVENTIL HFA, VENTOLIN HFA, PROAIR HFA) 90 mcg/actuation inhaler Take 1 Puff by inhalation every four (4) hours as needed for Wheezing. 1 Inhaler 0       Past History     Past Medical History:  Past Medical History:   Diagnosis Date    Asthma     last attack years ago   [facility])     BREAST       Past Surgical History:  Past Surgical History:   Procedure Laterality Date    HX BREAST BIOPSY      HX  SECTION         Family History:  Family History   Problem Relation Age of Onset    No Known Problems Mother     No Known Problems Father        Social History:  Social History     Tobacco Use    Smoking status: Never Smoker    Smokeless tobacco: Never Used   Substance Use Topics    Alcohol use: Yes     Comment: OCASSIONAL    Drug use:  Yes Allergies: Allergies   Allergen Reactions    Percocet [Oxycodone-Acetaminophen] Swelling     Pt can not remember if her mom told her  percocet or penicillin allergy so she does not take either. patient reports tylenol makes her eyes swell. States she is not allergic to Percocet. She has never taken it.  Tylenol [Acetaminophen] Anaphylaxis    Penicillins Other (comments)     Pt. States she is not allergic Penicillins- she has never taken it.  Gadavist [Gadobutrol] Nausea Only and Other (comments)     CO NAUSEA AND HEADACHE POST INJECTION. Review of Systems   Review of Systems   Constitutional: Negative for chills and fever. Respiratory: Negative for shortness of breath. Cardiovascular: Negative for chest pain. Gastrointestinal: Negative for abdominal pain, nausea and vomiting. Genitourinary: Negative for flank pain. Musculoskeletal: Negative for back pain and myalgias. Skin: Negative for color change, pallor, rash and wound. Left thumb pain and swelling   Neurological: Negative for dizziness, weakness and light-headedness. All other systems reviewed and are negative. Physical Exam   Physical Exam  Vitals and nursing note reviewed. Constitutional:       General: She is not in acute distress. Appearance: She is well-developed. Comments: Pt in NAD   HENT:      Head: Normocephalic and atraumatic. Eyes:      Conjunctiva/sclera: Conjunctivae normal.   Cardiovascular:      Rate and Rhythm: Normal rate and regular rhythm. Heart sounds: Normal heart sounds. Pulmonary:      Effort: Pulmonary effort is normal. No respiratory distress. Breath sounds: Normal breath sounds. Abdominal:      General: Bowel sounds are normal. There is no distension. Palpations: Abdomen is soft. Musculoskeletal:         General: Normal range of motion. Skin:     General: Skin is warm. Findings: No rash.       Comments: Left thumb: Swelling and erythema that crosses IP joint and to volar aspect of thumb. Pus visualized under skin. Decreased ROM due to swelling. <2 sec cap refill. Sensation intact. Neurological:      Mental Status: She is alert and oriented to person, place, and time. Psychiatric:         Behavior: Behavior normal.         Diagnostic Study Results     Labs -     Recent Results (from the past 12 hour(s))   CBC WITH AUTOMATED DIFF    Collection Time: 07/05/21  9:10 AM   Result Value Ref Range    WBC 3.0 (L) 4.6 - 13.2 K/uL    RBC 3.59 (L) 4.20 - 5.30 M/uL    HGB 10.7 (L) 12.0 - 16.0 g/dL    HCT 32.0 (L) 35.0 - 45.0 %    MCV 89.1 74.0 - 97.0 FL    MCH 29.8 24.0 - 34.0 PG    MCHC 33.4 31.0 - 37.0 g/dL    RDW 15.7 (H) 11.6 - 14.5 %    PLATELET 636 020 - 233 K/uL    MPV 9.6 9.2 - 11.8 FL    NEUTROPHILS 63 40 - 73 %    LYMPHOCYTES 28 21 - 52 %    MONOCYTES 7 3 - 10 %    EOSINOPHILS 0 0 - 5 %    BASOPHILS 1 0 - 2 %    ABS. NEUTROPHILS 1.9 1.8 - 8.0 K/UL    ABS. LYMPHOCYTES 0.8 (L) 0.9 - 3.6 K/UL    ABS. MONOCYTES 0.2 0.05 - 1.2 K/UL    ABS. EOSINOPHILS 0.0 0.0 - 0.4 K/UL    ABS. BASOPHILS 0.0 0.0 - 0.1 K/UL    DF AUTOMATED     METABOLIC PANEL, COMPREHENSIVE    Collection Time: 07/05/21  9:10 AM   Result Value Ref Range    Sodium 138 136 - 145 mmol/L    Potassium 4.0 3.5 - 5.5 mmol/L    Chloride 108 100 - 111 mmol/L    CO2 25 21 - 32 mmol/L    Anion gap 5 3.0 - 18 mmol/L    Glucose 144 (H) 74 - 99 mg/dL    BUN 17 7.0 - 18 MG/DL    Creatinine 0.81 0.6 - 1.3 MG/DL    BUN/Creatinine ratio 21 (H) 12 - 20      GFR est AA >60 >60 ml/min/1.73m2    GFR est non-AA >60 >60 ml/min/1.73m2    Calcium 9.2 8.5 - 10.1 MG/DL    Bilirubin, total 0.4 0.2 - 1.0 MG/DL    ALT (SGPT) 41 13 - 56 U/L    AST (SGOT) 27 10 - 38 U/L    Alk.  phosphatase 110 45 - 117 U/L    Protein, total 8.2 6.4 - 8.2 g/dL    Albumin 4.0 3.4 - 5.0 g/dL    Globulin 4.2 (H) 2.0 - 4.0 g/dL    A-G Ratio 1.0 0.8 - 1.7     POC LACTIC ACID    Collection Time: 07/05/21  9:17 AM   Result Value Ref Range    Lactic Acid (POC) 1.71 0.40 - 2.00 mmol/L   PROTHROMBIN TIME + INR    Collection Time: 07/05/21 12:33 PM   Result Value Ref Range    Prothrombin time 12.5 11.5 - 15.2 sec    INR 0.9 0.8 - 1.2     PTT    Collection Time: 07/05/21 12:33 PM   Result Value Ref Range    aPTT 28.7 23.0 - 36.4 SEC   EKG, 12 LEAD, INITIAL    Collection Time: 07/05/21 12:46 PM   Result Value Ref Range    Ventricular Rate 73 BPM    Atrial Rate 73 BPM    P-R Interval 144 ms    QRS Duration 86 ms    Q-T Interval 400 ms    QTC Calculation (Bezet) 440 ms    Calculated P Axis 49 degrees    Calculated R Axis 57 degrees    Calculated T Axis 23 degrees    Diagnosis       Normal sinus rhythm  Cannot rule out Anterior infarct , age undetermined  Abnormal ECG  No previous ECGs available     HCG URINE, QL    Collection Time: 07/05/21  3:35 PM   Result Value Ref Range    HCG urine, QL Negative NEG         Radiologic Studies -   XR HAND LT MIN 3 V   Final Result   Soft tissue swelling of the left thumb. No radiographic finding for   an acute osseous process or radiopaque foreign body. .                    CT Results  (Last 48 hours)    None        CXR Results  (Last 48 hours)    None          Medical Decision Making   I am the first provider for this patient. I reviewed the vital signs, available nursing notes, past medical history, past surgical history, family history and social history. Vital Signs-Reviewed the patient's vital signs. Patient Vitals for the past 12 hrs:   Temp Pulse Resp BP SpO2   07/05/21 1425  76 16 122/74 99 %   07/05/21 1145 98.1 °F (36.7 °C) 87 16 119/76 100 %   07/05/21 0841 97.9 °F (36.6 °C) (!) 110 18 (!) 144/86 99 %       Records Reviewed: Nursing Notes and Old Medical Records    Provider Notes (Medical Decision Making):   DDx: Geo Otero    30 yo F who presents with left thumb swelling x 5 days. On exam swelling surrounding nailbed with pain and swelling to volar aspect of thumb and extends to MCP.   Orthopedic doctor evaluated in ED and recommends surgical drainage. Pt admitted for further management. ED Course:   Initial assessment performed. The patients presenting problems have been discussed, and they are in agreement with the care plan formulated and outlined with them. I have encouraged them to ask questions as they arise throughout their visit. Chart review:  .0 06/28/2021   HGB 10.1 (L) 06/28/2021   HCT 30.2 (L) 06/28/2021   NEUTROABS 1.4 (L) 06/28/2021     1111  Spoke with Dr Pat Paris, consult ortho. Discussed patient's presentation, physical exam and imaging findings. He states he will come evaluate in ED.     1215  Dr Pat Paris evaluated pt in ED. He recommends surgery for drainage of felon and to further investigate possible septic joint. He recommends admission and IV abx. Disposition:  Admitted    PLAN:  1. Current Discharge Medication List      START taking these medications    Details   traMADoL (ULTRAM) 50 mg tablet Take 2 Tablets by mouth every six (6) hours as needed for Pain for up to 7 days. Max Daily Amount: 400 mg. Qty: 30 Tablet, Refills: 0  Start date: 7/5/2021, End date: 7/12/2021    Associated Diagnoses: Felon of finger of right hand           2. Follow-up Information    None       Return to ED if worse     Diagnosis     Clinical Impression:   1. Felon of finger of right hand        Attestations:    TRAVIS Faustin    Please note that this dictation was completed with [employer] the computer voice recognition software. Quite often unanticipated grammatical, syntax, homophones, and other interpretive errors are inadvertently transcribed by the computer software. Please disregard these errors. Please excuse any errors that have escaped final proofreading. Thank you.

## 2021-07-06 ENCOUNTER — TELEPHONE (OUTPATIENT)
Dept: ORTHOPEDIC SURGERY | Age: 26
End: 2021-07-06

## 2021-07-06 VITALS
DIASTOLIC BLOOD PRESSURE: 74 MMHG | SYSTOLIC BLOOD PRESSURE: 114 MMHG | TEMPERATURE: 97.6 F | RESPIRATION RATE: 19 BRPM | HEART RATE: 81 BPM | OXYGEN SATURATION: 97 %

## 2021-07-06 DIAGNOSIS — L03.011 FELON OF FINGER OF RIGHT HAND: ICD-10-CM

## 2021-07-06 LAB
ANION GAP SERPL CALC-SCNC: 2 MMOL/L (ref 3–18)
BUN SERPL-MCNC: 13 MG/DL (ref 7–18)
BUN/CREAT SERPL: 17 (ref 12–20)
CALCIUM SERPL-MCNC: 9.1 MG/DL (ref 8.5–10.1)
CHLORIDE SERPL-SCNC: 106 MMOL/L (ref 100–111)
CO2 SERPL-SCNC: 27 MMOL/L (ref 21–32)
CREAT SERPL-MCNC: 0.78 MG/DL (ref 0.6–1.3)
ERYTHROCYTE [DISTWIDTH] IN BLOOD BY AUTOMATED COUNT: 15.5 % (ref 11.6–14.5)
GLUCOSE SERPL-MCNC: 142 MG/DL (ref 74–99)
HCT VFR BLD AUTO: 29.9 % (ref 35–45)
HGB BLD-MCNC: 9.6 G/DL (ref 12–16)
MCH RBC QN AUTO: 29.2 PG (ref 24–34)
MCHC RBC AUTO-ENTMCNC: 32.1 G/DL (ref 31–37)
MCV RBC AUTO: 90.9 FL (ref 74–97)
PLATELET # BLD AUTO: 216 K/UL (ref 135–420)
PMV BLD AUTO: 9.7 FL (ref 9.2–11.8)
POTASSIUM SERPL-SCNC: 4.7 MMOL/L (ref 3.5–5.5)
RBC # BLD AUTO: 3.29 M/UL (ref 4.2–5.3)
SODIUM SERPL-SCNC: 135 MMOL/L (ref 136–145)
WBC # BLD AUTO: 2.8 K/UL (ref 4.6–13.2)

## 2021-07-06 PROCEDURE — 74011250637 HC RX REV CODE- 250/637: Performed by: ORTHOPAEDIC SURGERY

## 2021-07-06 PROCEDURE — 99218 HC RM OBSERVATION: CPT

## 2021-07-06 PROCEDURE — 74011000258 HC RX REV CODE- 258: Performed by: ORTHOPAEDIC SURGERY

## 2021-07-06 PROCEDURE — 36415 COLL VENOUS BLD VENIPUNCTURE: CPT

## 2021-07-06 PROCEDURE — 99024 POSTOP FOLLOW-UP VISIT: CPT | Performed by: PHYSICIAN ASSISTANT

## 2021-07-06 PROCEDURE — 74011250637 HC RX REV CODE- 250/637: Performed by: INTERNAL MEDICINE

## 2021-07-06 PROCEDURE — 2709999900 HC NON-CHARGEABLE SUPPLY

## 2021-07-06 PROCEDURE — 85027 COMPLETE CBC AUTOMATED: CPT

## 2021-07-06 PROCEDURE — 77030018836 HC SOL IRR NACL ICUM -A

## 2021-07-06 PROCEDURE — 74011250636 HC RX REV CODE- 250/636: Performed by: ORTHOPAEDIC SURGERY

## 2021-07-06 PROCEDURE — 80048 BASIC METABOLIC PNL TOTAL CA: CPT

## 2021-07-06 RX ORDER — AMOXICILLIN AND CLAVULANATE POTASSIUM 875; 125 MG/1; MG/1
1 TABLET, FILM COATED ORAL 2 TIMES DAILY
Qty: 14 TABLET | Refills: 0 | Status: SHIPPED | OUTPATIENT
Start: 2021-07-06 | End: 2021-07-13

## 2021-07-06 RX ADMIN — Medication 10 ML: at 05:34

## 2021-07-06 RX ADMIN — TRAMADOL HYDROCHLORIDE 100 MG: 50 TABLET, COATED ORAL at 08:19

## 2021-07-06 RX ADMIN — SODIUM CHLORIDE 3 G: 900 INJECTION INTRAVENOUS at 05:55

## 2021-07-06 RX ADMIN — SODIUM CHLORIDE 3 G: 900 INJECTION INTRAVENOUS at 00:13

## 2021-07-06 RX ADMIN — ENOXAPARIN SODIUM 40 MG: 40 INJECTION SUBCUTANEOUS at 08:20

## 2021-07-06 RX ADMIN — IBUPROFEN 800 MG: 400 TABLET, FILM COATED ORAL at 02:01

## 2021-07-06 NOTE — PROGRESS NOTES
Discharge order noted for today. Orders reviewed. Home health was ordered for dressing changes but the ordered dressings are not considered a skilled need. Pt informed and given dressing supplies; will follow up with surgery in a week. MD notified. No needs identified at this time.  remains available if needed.   Sam Mendoza RN - Outcomes Manager  [phone]

## 2021-07-06 NOTE — PERIOP NOTES
TRANSFER - OUT REPORT:    Verbal report given to Sandra(name) on Blas Hilario  being transferred to [address] [place] [address](unit) for routine post - op       Report consisted of patients Situation, Background, Assessment and   Recommendations(SBAR). Information from the following report(s) SBAR, Kardex, Procedure Summary and MAR was reviewed with the receiving nurse. Lines:   Peripheral IV 07/05/21 Right Forearm (Active)   Site Assessment Clean, dry, & intact 07/05/21 2026   Phlebitis Assessment 0 07/05/21 2026   Infiltration Assessment 0 07/05/21 2026   Dressing Status Clean, dry, & intact 07/05/21 2026   Dressing Type Transparent;Tape 07/05/21 2026   Hub Color/Line Status Infusing;Blue 07/05/21 2026       Peripheral IV 07/05/21 Right Antecubital (Active)   Site Assessment Clean, dry, & intact 07/05/21 2026   Phlebitis Assessment 0 07/05/21 2026   Infiltration Assessment 0 07/05/21 2026   Dressing Status Clean, dry, & intact 07/05/21 2026   Dressing Type Tape;Transparent 07/05/21 2026   Hub Color/Line Status Pink; Infusing 07/05/21 2026   Action Taken Blood drawn 07/05/21 1240   Alcohol Cap Used Yes 07/05/21 1240        Opportunity for questions and clarification was provided.       Patient transported with:  [facility]ospital [facility]

## 2021-07-06 NOTE — LETTER
NOTIFICATION RETURN TO WORK / SCHOOL    7/8/2021 1:15 PM    Ms. Yessica Liu  [address] [facility] [address]      To Whom It May Concern:    Yessica Liu is currently under the care of [address] [place] [address] Tulio Valdes. She had a procedure done on 7/5/21 and will remain out of work until 7/15/21. She will be re evaluated on 7/14. If there are questions or concerns please have the patient contact our office.         Sincerely,      Eleuterio Bynum MD

## 2021-07-06 NOTE — DISCHARGE INSTRUCTIONS
DISCHARGE SUMMARY from Nurse    PATIENT INSTRUCTIONS:    After general anesthesia or intravenous sedation, for 24 hours or while taking prescription Narcotics:  · Limit your activities  · Do not drive and operate hazardous machinery  · Do not make important personal or business decisions  · Do  not drink alcoholic beverages  · If you have not urinated within 8 hours after discharge, please contact your surgeon on call. Report the following to your surgeon:  · Excessive pain, swelling, redness or odor of or around the surgical area  · Temperature over 100.5  · Nausea and vomiting lasting longer than 4 hours or if unable to take medications  · Any signs of decreased circulation or nerve impairment to extremity: change in color, persistent  numbness, tingling, coldness or increase pain  · Any questions    What to do at Home:   Patient armband removed and shredded    Recommended activity: Activity as tolerated, as tolerated    If you experience any of the following symptoms elevated temperature notify doctor    *  Please give a list of your current medications to your Primary Care Provider. *  Please update this list whenever your medications are discontinued, doses are      changed, or new medications (including over-the-counter products) are added. *  Please carry medication information at all times in case of emergency situations. These are general instructions for a healthy lifestyle:    No smoking/ No tobacco products/ Avoid exposure to second hand smoke  Surgeon General's Warning:  Quitting smoking now greatly reduces serious risk to your health.     Obesity, smoking, and sedentary lifestyle greatly increases your risk for illness    A healthy diet, regular physical exercise & weight monitoring are important for maintaining a healthy lifestyle    You may be retaining fluid if you have a history of heart failure or if you experience any of the following symptoms:  Weight gain of 3 pounds or more overnight or 5 pounds in a week, increased swelling in our hands or feet or shortness of breath while lying flat in bed. Please call your doctor as soon as you notice any of these symptoms; do not wait until your next office visit. The discharge information has been reviewed with the patient. The patient verbalized understanding. Discharge medications reviewed with the patient and appropriate educational materials and side effects teaching were provided.   ___________________________________________________________________________________________________________________________________

## 2021-07-06 NOTE — ROUTINE PROCESS
Patient is alert and oriented times three with no signs or symptoms of distress. Pulse palpable dressing is clean dry and intact.

## 2021-07-06 NOTE — ROUTINE PROCESS
TRANSFER - IN REPORT:    Verbal report received from [facility]) on [facility]  being received from ED(unit) for change in patient condition(post op care)      Report consisted of patients Situation, Background, Assessment and   Recommendations(SBAR). Information from the following report(s) SBAR was reviewed with the receiving nurse. Opportunity for questions and clarification was provided. Assessment completed upon patients arrival to unit and care assumed.

## 2021-07-06 NOTE — ANESTHESIA POSTPROCEDURE EVALUATION
Procedure(s):  INCISION AND DRAINAGE LEFT THUMB. general    Anesthesia Post Evaluation      Multimodal analgesia: multimodal analgesia used between 6 hours prior to anesthesia start to PACU discharge  Patient location during evaluation: bedside  Patient participation: complete - patient participated  Level of consciousness: awake  Pain management: adequate  Airway patency: patent  Anesthetic complications: no  Cardiovascular status: stable  Respiratory status: acceptable  Hydration status: acceptable  Post anesthesia nausea and vomiting:  controlled      INITIAL Post-op Vital signs:   Vitals Value Taken Time   /56 07/05/21 2045   Temp 36.7 °C (98 °F) 07/05/21 2045   Pulse 88 07/05/21 2053   Resp 16 07/05/21 2053   SpO2 98 % 07/05/21 2053   Vitals shown include unvalidated device data.

## 2021-07-06 NOTE — ROUTINE PROCESS
[facility]  0820 tramadol given for pain  Pain releived  Discharge instructions given  Discharge to home via wc

## 2021-07-06 NOTE — PROGRESS NOTES
Eliane Garcia has an infection, left thumb, cellulitis left thumb, paronychia left thumb. There is no evidence of septic arthritis left thumb IP joint, and no evidence of gross felon in her left thumb. Anticipate the followin. Home health care: Please wash left thumb, with sterile saline, apply Xeroform, to left thumb dorsal and volar thumb aspects, apply sterile 4 x 4's, 4 inch Kerlix and a sterile Ace wrap. Please conduct dressing changes, every other day. 2.  Unasyn IV antibiotics: Anticipate her being able to start Augmentin 875 1 p.o. twice daily. Will follow her cultures. 3.  23 hours observation, admitted to internal medicine: Respectfully. 4.  She is scheduled for chemotherapy, tomorrow,2021 [place]//  I am not sure it is wise to resume the chemotherapy tomorrow, until her infection is treated and her left thumb.       Juan Millard MD  2021  8:37 PM

## 2021-07-06 NOTE — DISCHARGE SUMMARY
DISCHARGE SUMMARY         Patient: Nadeem Shelton               Sex: female          MRN: [id number]         YOB: 1995      Age:  31 y.o.          LOS: 1 day                    ADMIT DATE:     7/5/2021    DISCHARGE DATE:     7/6/2021     ADMISSION DIAGNOSIS:     Felon of finger of right hand [L03.011]  Cellulitis and abscess of hand [L03.119, L02.519]    DISCHARGE ORTHOPAEDIC DIAGNOSIS:     Felon of finger of right hand [L03.011]  Cellulitis and abscess of hand [L03.119, L02.519]    DISCHARGE CONDITION:     GOOD    Afebrile  Ambulating  Eating, Drinking, Voiding  Stable    DISCHARGE DESTINATION:     Home      HPI:     Patient is a 31 y.o. female that sustained     Felon of finger of right hand [L03.011]  Cellulitis and abscess of hand [L03.119, L02.519]    Due to the current findings and affected activity of daily living surgical intervention is indicated. The alternatives, risks, complications as well as expected outcome were discussed, the patient understands and wishes to proceed with surgery. HOSPITAL COURSE:     Patient had      Procedure(s):  INCISION AND DRAINAGE LEFT THUMB     POST-OP COURSE:     The patient tolerated the procedure well and was followed by internal medicine for help with medical management. Patient was place on antibiotic both pre and post-op for prophylaxis against infection. Vitals signs closely monitored and stable at discharge. The patient remained afebrile. Patient is in no acute distress and denies any N/V/D/C, no CP/SOB, no UR/GI/ symptoms. The wound was CDI. Patient had negative calf tenderness or swelling, no evidence for DVT. Patient had PT/OT consult for evaluation and treatment. There were  no transfusions with Blood products during this hospital stay.       Physical Exam on Discharge:     Vital signs at discharge:   Visit Vitals  /69   Pulse 76   Temp 97.9 °F (36.6 °C)   Resp 20   SpO2 98%   Breastfeeding No         Intake/Output Summary (Last 24 hours) at 7/6/2021 0928  Last data filed at 7/6/2021 0459  Gross per 24 hour   Intake 2065.83 ml   Output 1000 ml   Net 1065.83 ml         speech normal in context and clarity  memory intact grossly  Alert, Oriented x 3     CHEST/ABDOMEN: Observation reveals: No audible wheezing from mouth. No accessory use of chest muscles during breathing. Non tender abdomen    Examination of LUE reveals wound covered. Incision/Dressings:  Dressings are clean, dry and Intact. Extremities:        No significant edema or embolic phenomena to the foot/toes or hands                               cap refill < 2 seconds. Sensory Motor, NV grossly intact          Lower extremities are warm and appear well perfused          Neg calf tenderness nor evidence DVT                                CONSULTS:     Internal Medicine    SIGNIFICANT DIAGNOSTIC STUDIES:     Labs:    Basic Metabolic Profile   Recent Labs     07/06/21 0423 07/05/21  0910   * 138   CO2 27 25   BUN 13 17        Hepatic Function   No results for input(s): ALBUMIN in the last 72 hours.     No lab exists for component: TOTPR, BILID, BILIT, SGPTALT, SGOTAST, ALKPHOS, AMYLASE, LIPASE      CBC w/Diff    Recent Labs     07/06/21  0423 07/05/21  0910   WBC 2.8* 3.0*   RBC 3.29* 3.59*   HCT 29.9* 32.0*   MCV 90.9 89.1   MCH 29.2 29.8   MCHC 32.1 33.4   RDW 15.5* 15.7*    Recent Labs     07/06/21  0423 07/05/21  0910   MONOS  --  7   EOS  --  0   BASOS  --  1   RDW 15.5* 15.7*         Coagulation   Recent Labs     07/05/21  1233   INR 0.9   APTT 28.7             CHRONIC MEDICAL DIAGNOSES:     Problem List as of 7/6/2021 Date Reviewed: 7/5/2021        Codes Class Noted - Resolved    Felon of finger of right hand ICD-10-CM: L03.011  ICD-9-CM: 681.01  7/5/2021 - Present        Cellulitis and abscess of hand ICD-10-CM: L03.119, L02.519  ICD-9-CM: 682.4  7/5/2021 - Present        Pregnancy ICD-10-CM: Z34.90  ICD-9-CM: V22.2  7/30/2016 - Present DISCHARGE MEDICATIONS:     Current Discharge Medication List      START taking these medications    Details   amoxicillin-clavulanate (Augmentin) 875-125 mg per tablet Take 1 Tablet by mouth two (2) times a day for 7 days. Indications: an infection of the skin and the tissue below the skin  Qty: 14 Tablet, Refills: 0  Start date: 7/6/2021, End date: 7/13/2021      traMADoL (ULTRAM) 50 mg tablet Take 2 Tablets by mouth every six (6) hours as needed for Pain for up to 7 days. Max Daily Amount: 400 mg. Qty: 30 Tablet, Refills: 0  Start date: 7/5/2021, End date: 7/12/2021    Associated Diagnoses: Felon of finger of right hand               · It is important that you take the medication exactly as they are prescribed. · Keep your medication in the bottles provided by the pharmacist and keep a list of the medication names, dosages, and times to be taken in your wallet. · Do not take other medications without consulting your doctor. · Avoid constipation associated with Pain medication use by taking OTC Colace, Metamucil, Miralax or Milk of Magnesia    DISCHARGE PLAN:       The patient will be discharged to Home with home health. NWB left upper extremity. DIET:  Resume prior unless directed otherwise    NUTRITION: OTC Nutritional supplements, multivitamins, calcium with Vitamin  D    ACTIVITY: No lifting, Driving, or Strenuous exercise and No heavy lifting, pushing, pulling. Weight Bearing/Range of Motion Restrictions: NWB to the affected extremity    INCISION CARE: Keep wound clean and dry, reinforce dressing as needee and Ice to area for comfort. Keep the current dressings on and in place. There is no need to change these current dressings. Dressings to please be changed by home nurse or nursing home staff as instructed. Keep all pets away from  any wound present in order to prevent infection. PAIN CONTROL: Prescriptions:   On patient chart    PRECAUTIONS:  No lifting, twisting, squatting, deep bending. Elevate the affected extremity on 1 pillow. Place the pillow horizontal so that no pressure is on the back of your heel. VTE PROPHYLAXIS: WBAT    ANTIBIOTICS:   Augmentin 875 1 p.o. twice daily. Will follow her cultures. ADDITIONAL INFORMATION:       Please call [phone] ([address]) if any: fever > 101.5 , shakes, chills, nausea, vomiting, falling, intractable pain, or for any questions you have regarding your care/medical condition. If you experience any calf pain or swelling, or are having any shortness of breath, chest pain, or extremity swelling: Please go to closest ER ASAP for assessment to rule out a leg clot. Please contact your Primary Care Physician for all preoperative medication directions, including the above medications. FOLLOW UP CARE:     You are to call your primary care provider and set up an appointment to see them in 2 weeks. Follow-up in the office with Dr. Alonso Blanco. Dorian Gutierrez or Ankur Arteaga PA-C in 7 days. Please call [phone] to confirm your appointment. Call with any questions or concerns. Maile Martinez MPA, [facility]  [phone]

## 2021-07-06 NOTE — PROGRESS NOTES
PROGRESS NOTE         LOS: 1 day     ASSESSMENT:       Felon of finger of right hand [E40.666]  Cellulitis and abscess of hand [L03.119, L02.519]      1 Day Post-Op s/p:     Procedure(s):  INCISION AND DRAINAGE LEFT THUMB       PLAN:       Yuridia Christianson has an infection, left thumb, cellulitis left thumb, paronychia left thumb. There is no evidence of septic arthritis left thumb IP joint, and no evidence of gross felon in her left thumb. 1.  Home health care: Please wash left thumb, with sterile saline, apply Xeroform, to left thumb dorsal and volar thumb aspects, apply sterile 4 x 4's, 4 inch Kerlix and a sterile Ace wrap. Please conduct dressing changes, every other day. 2.  Unasyn IV antibiotics: Anticipate her being able to start Augmentin 875 1 p.o. twice daily. Will follow her cultures. 3.  23 hours observation, admitted to internal medicine: Respectfully. 4.  She is scheduled for chemotherapy, today in 7/6/2021 [place]  5. Follow up in the office with Dr. Mona Sorto in 1 week         SUBJECTIVE:       Patient seen and evaluated. Doing well. No complaints resting comfortably, NAD. Denies: CP, SOB, ABD PAIN, Calf pain    OBJECTIVE:     Visit Vitals  /69   Pulse 76   Temp 97.9 °F (36.6 °C)   Resp 20   SpO2 98%   Breastfeeding No       speech normal in context and clarity  memory intact grossly  Alert, Oriented x 3 in bed    CHEST/ABDOMEN: Observation reveals: No audible wheezing from mouth. No accessory use of chest muscles during breathing. Non tender abdomen    Examination of LUE reveals wound covered. Incision/Dressings:  Dressings are clean, dry and  Intact. Extremities:        No significant edema or embolic phenomena to the foot/toes or hands                               cap refill < 2 seconds.          Sensory, Motor, NV grossly intact          Lower extremities are warm and appear well perfused          Neg calf tenderness nor evidence DVT Labs:    CBC  Lab Results   Component Value Date/Time    WBC 2.8 (L) 07/06/2021 04:23 AM    RBC 3.29 (L) 07/06/2021 04:23 AM    HCT 29.9 (L) 07/06/2021 04:23 AM    MCV 90.9 07/06/2021 04:23 AM    MCH 29.2 07/06/2021 04:23 AM    MCHC 32.1 07/06/2021 04:23 AM    RDW 15.5 (H) 07/06/2021 04:23 AM         Coagulation  Lab Results   Component Value Date    INR 0.9 07/05/2021    APTT 28.7 07/05/2021            Basic Metabolic Profile  Lab Results   Component Value Date     (L) 07/06/2021    CO2 27 07/06/2021    BUN 13 07/06/2021       Recent Results (from the past 24 hour(s))   PROTHROMBIN TIME + INR    Collection Time: 07/05/21 12:33 PM   Result Value Ref Range    Prothrombin time 12.5 11.5 - 15.2 sec    INR 0.9 0.8 - 1.2     PTT    Collection Time: 07/05/21 12:33 PM   Result Value Ref Range    aPTT 28.7 23.0 - 36.4 SEC   EKG, 12 LEAD, INITIAL    Collection Time: 07/05/21 12:46 PM   Result Value Ref Range    Ventricular Rate 73 BPM    Atrial Rate 73 BPM    P-R Interval 144 ms    QRS Duration 86 ms    Q-T Interval 400 ms    QTC Calculation (Bezet) 440 ms    Calculated P Axis 49 degrees    Calculated R Axis 57 degrees    Calculated T Axis 23 degrees    Diagnosis       Normal sinus rhythm  Cannot rule out Anterior infarct , age undetermined  Abnormal ECG  No previous ECGs available     HCG URINE, QL    Collection Time: 07/05/21  3:35 PM   Result Value Ref Range    HCG urine, QL Negative NEG     CBC W/O DIFF    Collection Time: 07/06/21  4:23 AM   Result Value Ref Range    WBC 2.8 (L) 4.6 - 13.2 K/uL    RBC 3.29 (L) 4.20 - 5.30 M/uL    HGB 9.6 (L) 12.0 - 16.0 g/dL    HCT 29.9 (L) 35.0 - 45.0 %    MCV 90.9 74.0 - 97.0 FL    MCH 29.2 24.0 - 34.0 PG    MCHC 32.1 31.0 - 37.0 g/dL    RDW 15.5 (H) 11.6 - 14.5 %    PLATELET 117 410 - 566 K/uL    MPV 9.7 9.2 - 95.3 FL   METABOLIC PANEL, BASIC    Collection Time: 07/06/21  4:23 AM   Result Value Ref Range    Sodium 135 (L) 136 - 145 mmol/L    Potassium 4.7 3.5 - 5.5 mmol/L    Chloride 106 100 - 111 mmol/L    CO2 27 21 - 32 mmol/L    Anion gap 2 (L) 3.0 - 18 mmol/L    Glucose 142 (H) 74 - 99 mg/dL    BUN 13 7.0 - 18 MG/DL    Creatinine 0.78 0.6 - 1.3 MG/DL    BUN/Creatinine ratio 17 12 - 20      GFR est AA >60 >60 ml/min/1.73m2    GFR est non-AA >60 >60 ml/min/1.73m2    Calcium 9.1 8.5 - 10.1 MG/DL     Temp Readings from Last 3 Encounters:   07/06/21 97.9 °F (36.6 °C)   02/19/21 97.1 °F (36.2 °C)   03/09/20 99 °F (37.2 °C)         All Micro Results     Procedure Component Value Units Date/Time    CULTURE, Elvis Buckner STAIN [[id number]] Collected: 07/05/21 2011    Order Status: Completed Updated: 07/05/21 2034    CULTURE, ANAEROBIC [[id number]] Collected: 07/05/21 2011    Order Status: Completed Updated: 07/05/21 2034    AFB CULTURE + SMEAR W/RFLX ID FROM CULTURE [[id number]] Collected: 07/05/21 2011    Order Status: Completed Updated: 07/05/21 2034    COVID-19 RAPID TEST [[id number]]     Order Status: [facility] KARRIE Mcmanus PA-C  7/6/2021

## 2021-07-06 NOTE — TELEPHONE ENCOUNTER
Patient called stating she did not receive a work note after her procedure done 07/05. Patient also stated she received her antibiotics, however the pharmacy did not have her prescription for Tramadol. She confirmed her pharmacy is [facility]. Patient was also asking why she is unable to get home health. Please advise patient at [phone].

## 2021-07-06 NOTE — PROGRESS NOTES
Problem: Falls - Risk of  Goal: *Absence of Falls  Description: Document Lily Lorenzana Fall Risk and appropriate interventions in the flowsheet.   Outcome: Progressing Towards Goal  Note: Fall Risk Interventions:            Medication Interventions: Assess postural VS orthostatic hypotension, Evaluate medications/consider consulting pharmacy, Patient to call before getting OOB, Teach patient to arise slowly                   Problem: Patient Education: Go to Patient Education Activity  Goal: Patient/Family Education  Outcome: Progressing Towards Goal

## 2021-07-06 NOTE — PROGRESS NOTES
Reason for Admission:  Felon of finger of right hand [L03.011]  Cellulitis and abscess of hand [L03.119, L02.519]                 RUR Score:    6%            Plan for utilizing home health:    yes                      Likelihood of Readmission:   LOW                         Transition of Care Plan:              Initial assessment completed with patient. Cognitive status of patient: oriented to time, place, person and situation. Face sheet information confirmed:  yes. The patient designates friendOmer Reason to participate in her discharge plan and to receive any needed information. This patient lives in a single family home alone. Patient is able to navigate steps as needed. Prior to hospitalization, patient was considered to be independent with ADLs/IADLS : yes . Patient has a current ACP document on file: no      Healthcare Decision Maker:   Primary Decision Maker: Dung STEPHENS gab - [phone]    Click here to complete [address] including selection of the Healthcare Decision Maker Relationship (ie \"Primary\")    The friend will be available to transport patient home upon discharge. The patient has no DME available in the home. Patient is not currently active with home health. Patient has not stayed in a skilled nursing facility or rehab. This patient is on dialysis :no    List of available Home Health agencies were provided and reviewed with the patient prior to discharge. Freedom of choice signed: yes, for any agency that can accommodate needs. Currently, the discharge plan is Home with [address] [place] [address] Steven Huitron. The patient states that she can obtain her medications from the pharmacy, and take her medications as directed. Patient's current insurance is [facility] Medicaid.                 Alysha Salazar RN - Outcomes Manager  [phone]

## 2021-07-06 NOTE — PROGRESS NOTES
Observation notice provided in writing to patient and/or caregiver as well as verbal explanation of the policy.   Patients who are in outpatient status also receive the Observation notice  Harley Villavicencio RN - Outcomes Manager  [phone]

## 2021-07-07 ENCOUNTER — PATIENT OUTREACH (OUTPATIENT)
Dept: CASE MANAGEMENT | Age: 26
End: 2021-07-07

## 2021-07-07 LAB
ATRIAL RATE: 73 BPM
CALCULATED P AXIS, ECG09: 49 DEGREES
CALCULATED R AXIS, ECG10: 57 DEGREES
CALCULATED T AXIS, ECG11: 23 DEGREES
DIAGNOSIS, 93000: NORMAL
P-R INTERVAL, ECG05: 144 MS
Q-T INTERVAL, ECG07: 400 MS
QRS DURATION, ECG06: 86 MS
QTC CALCULATION (BEZET), ECG08: 440 MS
VENTRICULAR RATE, ECG03: 73 BPM

## 2021-07-07 RX ORDER — DIPHENHYDRAMINE HCL 25 MG
25 CAPSULE ORAL
Qty: 20 CAPSULE | Refills: 0 | Status: SHIPPED | OUTPATIENT
Start: 2021-07-07 | End: 2021-07-12

## 2021-07-07 RX ORDER — TRAMADOL HYDROCHLORIDE 50 MG/1
100 TABLET ORAL
Qty: 30 TABLET | Refills: 0 | Status: SHIPPED | OUTPATIENT
Start: 2021-07-07 | End: 2021-07-14

## 2021-07-07 NOTE — PROGRESS NOTES
Care Transitions Initial Call    Call within 2 business days of discharge: Yes     Patient: Katelyn Kunz Patient : 1995 MRN: [id number]    Last Discharge [address]       Complaint Diagnosis Description Type Department Provider    21 Thumb Pain Felon of finger of right hand . .. ED to Hosp-Admission (Discharged) (ADMIT) Blanche oBse MD; Arnie Salter. .. Was this an external facility discharge? No Discharge Facility: [facility] 2021 to 2021    Challenges to be reviewed by the provider   Additional needs identified to be addressed with provider: no  none         Method of communication with provider : none    Discussed COVID-19 related testing which was not done at this time. Test results were not done. Patient informed of results, if available? n/a     Advance Care Planning:   Does patient have an Advance Directive: decision makers updated. Inpatient Readmission Risk score: Unplanned Readmit Risk Score: 6    Was this a readmission? no   Patient stated reason for the admission: felon of finger and cellulitis and abscess of right hand    Patients top risk factors for readmission: medical condition-healing from surgical procedure and support system   Interventions to address risk factors: Scheduled appointment with PCP-[facility] 2021 and Scheduled appointment with Specialist-Ortho- 2021    Care Transition Nurse (CTN) contacted the patient by telephone to perform post hospital discharge assessment. Verified name and  with patient as identifiers. Provided introduction to self, and explanation of the CTN role. CTN reviewed discharge instructions, medical action plan and red flags with patient who verbalized understanding. Were discharge instructions available to patient? yes. Reviewed appropriate site of care based on symptoms and resources available to patient including: PCP, Specialist and [employer].  Patient given an opportunity to ask questions and does not have any further questions or concerns at this time. The patient agrees to contact the PCP office for questions related to their healthcare. Medication reconciliation was performed with patient, who verbalizes understanding of administration of home medications. Advised obtaining a 90-day supply of all daily and as-needed medications. Referral to Pharm D needed: no     Home Health/Outpatient orders at discharge: [address]: n/a  Date of initial visit: [address] [place] [address] ordered at discharge: None  [address]: [address] [place] [address] received: n/a    Covid Risk Education    Educated patient about risk for severe COVID-19 due to risk factors according to CDC guidelines. CTN reviewed discharge instructions, medical action plan and red flag symptoms with the patient who verbalized understanding. Discussed COVID vaccination status: no. Education provided on COVID-19 vaccination as appropriate. Discussed exposure protocols and quarantine with CDC Guidelines. Patient was given an opportunity to verbalize any questions and concerns and agrees to contact CTN or health care provider for questions related to their healthcare. Was patient discharged with a pulse oximeter? no. Discussed and confirmed pulse oximeter discharge instructions and when to notify provider or seek emergency care. Discussed follow-up appointments. If no appointment was previously scheduled, appointment scheduling offered: yes. Is follow up appointment scheduled within 7 days of discharge? yes. [facility] follow up appointment(s):   Future Appointments   Date Time Provider Jose L Angeles   7/14/2021  7:30 AM Jairo Chu MD [facility]     Non-[facility] follow up appointment(s): n/a    Plan for follow-up call in 7-10 days based on severity of symptoms and risk factors.   Plan for next call: symptom management-ensure that pateint pain is undercontol. CTN provided contact information for future needs. Goals Addressed                 This Visit's Progress     hospital 30 day readmit        1. CTN will monitor X 4 weeks    2. Ensure provider appt is scheduled within 7 days post-discharge 7/7/2021 Patient has follow up with ortho and also PCP on 7/14/2021.    3. Confirm patient attended post-discharge provider apt    4. Complete post-visit call to confirm attendance and update care needs  7/7/2021 Patient stated that she is doing well today. Just a little bit of pain. No care needs noted. 5. Review/educate common or potential \"red flags\" of condition worsening 7/7/2021 Reviewed signs and symptoms of infection such as temperature greater than 100, confusion, fever and chills, decreased urination, rapid pulse, diarrhea, nausea, skin hot to touch, drainage from surgical site and aching muscles to name a few. 6. Evaluate adherence to medications and priority barriers to resolve  7/7/2021 Patient stated that she has all meds and is taking as prescribed. 7. Instruct on adherence to medications as ordered and assess for therapeutic response and side-effects   7/7/2021 Patient is aware of why she is taking meds and knows when to call physician with issues. 8. Discuss and evaluate ADL performance. Provide recommendations on energy conservation, particularly related to transition home from an inpatient admission. 7/7/2021 Patient does not use assistive devices. She rests as needed so she can recover. She is able to complete her ADL's.

## 2021-07-07 NOTE — TELEPHONE ENCOUNTER
Silvia to provide work note. Patient has a follow up in the office with Dr. Perry Baker on 7/14/21. The following prescriptions have been e-prescribed to the patients pharmacy:    Orders Placed This Encounter    traMADoL (ULTRAM) 50 mg tablet     Sig: Take 2 Tablets by mouth every six (6) hours as needed for Pain for up to 7 days. Max Daily Amount: 400 mg. Dispense:  30 Tablet     Refill:  0    diphenhydrAMINE (BenadryL) 25 mg capsule     Sig: Take 1 Capsule by mouth every six (6) hours as needed for Itching for up to 5 days. Indications: an allergic reaction     Dispense:  20 Capsule     Refill:  0           Maile Springer [facility], LUIS, PA-C  7/7/2021  8:43 AM

## 2021-07-08 LAB
BACTERIA SPEC CULT: ABNORMAL
BACTERIA SPEC CULT: NORMAL
GRAM STN SPEC: ABNORMAL
GRAM STN SPEC: ABNORMAL
SERVICE CMNT-IMP: ABNORMAL
SERVICE CMNT-IMP: NORMAL

## 2021-07-08 NOTE — TELEPHONE ENCOUNTER
Patient is checking on status of note as mentioned in previous response. She would like to pick this up from the HS location when available. Please advise.      Patient [phone]

## 2021-07-08 NOTE — TELEPHONE ENCOUNTER
Letter has been written and patient made aware she can  at .  Also told her that we sent the medication to the pharmacy

## 2021-07-13 ENCOUNTER — PATIENT OUTREACH (OUTPATIENT)
Dept: CASE MANAGEMENT | Age: 26
End: 2021-07-13

## 2021-07-13 NOTE — PROGRESS NOTES
7/13/2021 12:29 PM    CTN reviewed patient chart. I attempted to call for routine follow up. Patient was unavailable at the time of the call. Message left introducing myself, the purpose of the call and giving my contact information. Requested that patient call back at her earliest convenience. Will follow.

## 2021-07-13 NOTE — PROGRESS NOTES
AMBULATORY PROGRESS NOTE      Patient: Nery Mcpherson             MRN: [id number]     SSN: [id number] Body mass index is 46.01 kg/m². YOB: 1995     AGE: 31 y.o. EX: female    PCP: Chelsie Paez MD       IMPRESSION //  DIAGNOSIS AND TREATMENT PLAN        Nery Mcpherson has a diagnosis of:      The finger, the skin. A tiny amount of pus, is easily expressed. Otherwise she has function range of motion of her thumb, there is no redness erythema. No signs of felon. DIAGNOSES    1. Paronychia of finger of left hand        Orders Placed This Encounter    amoxicillin-clavulanate (AUGMENTIN) 875-125 mg per tablet     Sig: Take 1 Tablet by mouth two (2) times a day. Dispense:  14 Tablet     Refill:  0        PLAN:    1. I will advise patient to soak her left thumb in Peroxide daily. 2. I will renew her Rx of Augmentin. RTO-  1 week    Nery Mcpherson  expresses understanding of the diagnosis, treatment plan, and all of their proposed questions were answered to their satisfaction. Patient education has been provided re the diagnoses. HPI //  [address] A 31 y.o. female who is a/an  established patient, presenting to my outpatient office for evaluation of  the following chief complaint(s):     Chief Complaint   Patient presents with    Thumb Pain     left post op     Incision and Drainage of left thumb 7/05/2021. Patient presents today for post op of the stated procedure above. She states there was pus present from her left thumb after the surgery. Notes she endorses itching sensations in her left thumb. Pt communicates that she is still taking her antibiotics.      Visit Vitals  Pulse 73   Temp 97.1 °F (36.2 °C)   Ht 5' (1.524 m)   Wt 235 lb 9.6 oz (106.9 kg)   SpO2 99%   BMI 46.01 kg/m²       Appearance: Alert, well appearing and pleasant patient who is in no distress, oriented to person, place/time, and who follows commands. This patient is accompanied in the examination room by her  self. There is signs of: no dementia  Psychiatric: Affect/mood are appropriate. Speech normal in context and clarity, memory intact grossly, no involuntary movements - tremors. Patient arrives to office via: without assistive device. H EENT (2): Head normocephalic & atraumatic. Eye: pupils are round// EOM are intact // Neck: ROM WNL  // Hearings Intact   Respiratory: Breathing non labored     LEFT HAND    THUMB: minimal swelling to left thumb  INCISION incision is healing nicely over the IP region  MOTION: There are some limited range of motion of the thumb at the IP region  SKIN: Slight minimal amount of pus, at the paronychia region, at the nail cuticle interface, this was gently expressed, otherwise there is no redness erythema. CHART REVIEW     AFB NEGATIVE      7/8/2021  9:02 AM - Rohan, Lab In [facility]    Specimen Information: Thumb        Component Value Ref Range & Units Status   Special Requests: LEFT THUMB INFECTION   Final   GRAM STAIN NO WBC'S SEEN    Final   GRAM STAIN    Final   3+ GRAM POSITIVE COCCI IN CLUSTERS    Culture result: Abnormal     Final   MODERATE Streptococcus intermedius    Susceptibility     Streptococcus intermedius     MICHELLE    Ampicillin ($) <=0.25 ug/mL S    Cefotaxime <=0.12 ug/mL S    Ceftriaxone ($) 0.25 ug/mL S    Erythromycin ($$$$) 2 ug/mL R    Penicillin G ($$) <=0.06 ug/mL S    Vancomycin ($) 0.5 ug/mL S          Result History    CULTURE, WOUND W GRAM STAIN on 7/8/2021           Yessica Liu has been experiencing pain and discomfort confirmed as outlined in the pain assessment outlined below.  was reviewed by Magda Edwards on 7/14/2021.      Pain Assessment  7/14/2021   Location of Pain Other (comment)   Pain Location Comment thumb   Location Modifiers Left   Severity of Pain 3   Quality of Pain Dull   Duration of Pain Persistent   Frequency of Pain Constant   Aggravating Factors Other (Comment)   Aggravating Factors Comment using hand   Limiting Behavior Some   Relieving Factors Nothing   Relieving Factors Comment not moving thumb        Miller Miranda  has a past medical history of Asthma, Cancer ([address] and Chronic pain of left thumb. Patients is employed at:         Past Medical History:   Diagnosis Date    Asthma     last attack years ago    Cancer [facility])     BREAST    Chronic pain of left thumb      Past Surgical History:   Procedure Laterality Date    HAND/FINGER SURGERY UNLISTED      left thumb    HX BREAST BIOPSY      HX  SECTION       Current Outpatient Medications   Medication Sig    amoxicillin-clavulanate (AUGMENTIN) 875-125 mg per tablet Take 1 Tablet by mouth two (2) times a day.  traMADoL (ULTRAM) 50 mg tablet Take 2 Tablets by mouth every six (6) hours as needed for Pain for up to 7 days. Max Daily Amount: 400 mg.    OXYBUTYNIN CHLORIDE PO Take 5 mg by mouth daily.  albuterol (PROVENTIL HFA, VENTOLIN HFA, PROAIR HFA) 90 mcg/actuation inhaler Take 1 Puff by inhalation every four (4) hours as needed for Wheezing. (Patient not taking: Reported on 2021)     No current facility-administered medications for this visit. Allergies   Allergen Reactions    Percocet [Oxycodone-Acetaminophen] Swelling     Pt can not remember if her mom told her  percocet or penicillin allergy so she does not take either. patient reports tylenol makes her eyes swell. States she is not allergic to Percocet. She has never taken it.  Tylenol [Acetaminophen] Anaphylaxis    Penicillins Other (comments)     Pt. States she is not allergic Penicillins- she has never taken it.  Gadavist [Gadobutrol] Nausea Only and Other (comments)     CO NAUSEA AND HEADACHE POST INJECTION. Social History     Occupational History    Not on file   Tobacco Use    Smoking status: Never Smoker    Smokeless tobacco: Never Used   Substance and Sexual Activity    Alcohol use:  Yes Comment: Gil Most Drug use: Yes    Sexual activity: Yes     Birth control/protection: None     Family History   Problem Relation Age of Onset    No Known Problems Mother     No Known Problems Father         DIAGNOSTIC LAB DATA      No results found for: HBA1C, KOO9CHKR, MRF0TVMP //   Lab Results   Component Value Date/Time    Glucose 142 (H) 07/06/2021 04:23 AM        No results found for: HFK3VTIJ, SSU6EXOB      No results found for: Nazareth Panda, XQVID3, XQVID, VD3RIA, [id number]      REVIEW OF SYSTEMS : 7/14/2021  ALL BELOW ARE Negative except : SEE HPI     All other systems reviewed and are negative. 12 point review of systems otherwise negative unless noted in HPI. DIAGNOSTIC IMAGING /ORDERS       Orders Placed This Encounter    amoxicillin-clavulanate (AUGMENTIN) 875-125 mg per tablet     Sig: Take 1 Tablet by mouth two (2) times a day. Dispense:  14 Tablet     Refill:  0               I have reviewed the results of the above study. The interpretation of this study is my professional opinion. On this date 07/14/2021 I have spent 20 minutes reviewing previous notes, test results and face to face with the patient discussing the diagnosis and importance of compliance with the treatment plan as well as documenting on the day of the visit. An electronic signature was used to authenticate this note. Disclaimer: Sections of this note are dictated using utilizing voice recognition software, which may have resulted in some phonetic based errors in grammar and contents. Even though attempts were made to correct all the mistakes, some may have been missed, and remained in the body of the document. If questions arise, please contact our department. Grant Boudreaux may have a reminder for a \"due or due soon\" health maintenance. I have asked that she contact her primary care provider for follow-up on this health maintenance. Fiona Vaughan, as dictated by Jethro Brown MD Kimber[place]  7/14/2021  4:40 PM

## 2021-07-14 ENCOUNTER — PATIENT OUTREACH (OUTPATIENT)
Dept: CASE MANAGEMENT | Age: 26
End: 2021-07-14

## 2021-07-14 ENCOUNTER — OFFICE VISIT (OUTPATIENT)
Dept: ORTHOPEDIC SURGERY | Age: 26
End: 2021-07-14
Payer: MEDICAID

## 2021-07-14 VITALS
TEMPERATURE: 97.1 F | HEART RATE: 73 BPM | HEIGHT: 60 IN | WEIGHT: 235.6 LBS | OXYGEN SATURATION: 99 % | BODY MASS INDEX: 46.26 KG/M2

## 2021-07-14 DIAGNOSIS — L03.012 PARONYCHIA OF FINGER OF LEFT HAND: Primary | ICD-10-CM

## 2021-07-14 PROCEDURE — 99024 POSTOP FOLLOW-UP VISIT: CPT | Performed by: ORTHOPAEDIC SURGERY

## 2021-07-14 RX ORDER — AMOXICILLIN AND CLAVULANATE POTASSIUM 875; 125 MG/1; MG/1
1 TABLET, FILM COATED ORAL 2 TIMES DAILY
Qty: 14 TABLET | Refills: 0 | Status: SHIPPED | OUTPATIENT
Start: 2021-07-14 | End: 2021-08-25

## 2021-07-14 NOTE — PROGRESS NOTES
Care Transitions Follow Up Call    Challenges to be reviewed by the provider   Additional needs identified to be addressed with provider: no  none           Method of communication with provider : none    Care Transition Nurse (CTN) contacted the patient by telephone to follow up after admission on 2021. Verified name and  with patient as identifiers. Addressed changes since last contact: none  Follow up appointment completed? yes. Was follow up appointment scheduled within 7 days of discharge? yes. With ortho    Advance Care Planning:   Does patient have an Advance Directive: yes, reviewed and current, yes, reviewed and needs to be updated, decision makers updated, not on file; education provided, not on file, patient declined education, referral to internal ACP facilitator. CTN reviewed discharge instructions, medical action plan and red flags with patient and discussed any barriers to care and/or understanding of plan of care after discharge. Discussed appropriate site of care based on symptoms and resources available to patient including: PCP, Specialist and [address]. The patient agrees to contact the PCP office for questions related to their healthcare. Patients top risk factors for readmission: medical condition-recovering from surgery and support system   Interventions to address risk factors: Scheduled appointment with [address] 2021    [address] follow up appointment(s):   Future Appointments   Date Time Provider Jose L Angeles   2021  2:15 PM Jimena Casey PA-C [facility] AMB   2021  1:00 PM [facility] ECHO ROOM [facility]     Non-[facility] follow up appointment(s): n/a      CTN provided contact information for future needs. Plan for follow-up call in 10-14 days based on severity of symptoms and risk factors.   Plan for next call: self management-ensure that patient is getting back to activities slowly- resting as she needs to between activities     Goals Addressed This Visit's Progress     hospital 30 day readmit        1. CTN will monitor X 4 weeks    2. Ensure provider appt is scheduled within 7 days post-discharge 7/7/2021 Patient has follow up with ortho and also PCP on 7/14/2021.    3. Confirm patient attended post-discharge provider apt    4. Complete post-visit call to confirm attendance and update care needs  7/7/2021 Patient stated that she is doing well today. Just a little bit of pain. No care needs noted. 7/14/2021 Patient stated that she is doing well. Getting better each day. 5. Review/educate common or potential \"red flags\" of condition worsening 7/7/2021 Reviewed signs and symptoms of infection such as temperature greater than 100, confusion, fever and chills, decreased urination, rapid pulse, diarrhea, nausea, skin hot to touch, drainage from surgical site and aching muscles to name a few. 6. Evaluate adherence to medications and priority barriers to resolve  7/7/2021 Patient stated that she has all meds and is taking as prescribed. 7/14/2021 Patient stated that she has all meds and is taking as directed. 7. Instruct on adherence to medications as ordered and assess for therapeutic response and side-effects   7/7/2021 Patient is aware of why she is taking meds and knows when to call physician with issues. 7/14/2021 Patient expressed no concerns with her meds at present time. 8. Discuss and evaluate ADL performance. Provide recommendations on energy conservation, particularly related to transition home from an inpatient admission. 7/7/2021 Patient does not use assistive devices. She rests as needed so she can recover. She is able to complete her ADL's. 7/14/2021 Patient stated that she is healing well. Getting stronger all the time.

## 2021-07-21 ENCOUNTER — OFFICE VISIT (OUTPATIENT)
Dept: ORTHOPEDIC SURGERY | Age: 26
End: 2021-07-21
Payer: MEDICAID

## 2021-07-21 VITALS
HEART RATE: 83 BPM | HEIGHT: 60 IN | RESPIRATION RATE: 15 BRPM | WEIGHT: 234 LBS | BODY MASS INDEX: 45.94 KG/M2 | OXYGEN SATURATION: 99 %

## 2021-07-21 DIAGNOSIS — L03.012 PARONYCHIA OF FINGER OF LEFT HAND: Primary | ICD-10-CM

## 2021-07-21 DIAGNOSIS — L03.011 FELON OF FINGER OF RIGHT HAND: ICD-10-CM

## 2021-07-21 PROCEDURE — 99024 POSTOP FOLLOW-UP VISIT: CPT | Performed by: PHYSICIAN ASSISTANT

## 2021-07-21 NOTE — PROGRESS NOTES
Abel Cortes (1995) is a 31 y.o. female, established patient, 16 days s/p Incision And Drainage Left Thumb - Left completed on 7/5/2021, and she is here for evaluation of the following chief complaint(s):    Chief Complaint   Patient presents with    Surgical Follow-up     left thumb          ASSESSMENT & PLAN:     Diagnoses and all orders for this visit:    1. Paronychia of finger of left hand    2. Felon of finger of right hand           ICD-10-CM ICD-9-CM   1. Paronychia of finger of left hand  L03.012 681.02   2. Felon of finger of right hand  L03.011 681.01       Patient Instructions         · Modify activity as instructed. Continue to clean and dress the incision   · Follow RICE protocol: Rest, Ice (not directly on the skin) and Elevate   · Take antiinflammatory medication as directed, (if tolerated)  · Take Prilosec or Pepcid while taking any antiinflammatory (if tolerated) medication  · Maintain proper nutrition   · Take the Augmentin antibiotic as ordered  · Band aids provided  · If you are a current smoker, quit or limit smoking  · Please follow up as instructed or sooner as needed          Follow-up and Dispositions    · Return in about 1 week (around 7/28/2021) for follow up evaluation, suture removal.          Dr. Cha Gomez has been consulted regarding the patient during this visit and he agrees with the assessment and plan    Patient expresses understanding of the diagnosis and treatment plan. Patient education has been provided. Abel Cortes may have a reminder for a \"due or due soon\" health maintenance. I have asked that she contact her primary care provider for follow-up on this health maintenance.  was reviewed by Velia Primrose, PA-C on 7/21/2021. SUBJECTIVE & OBJECTIVE:     HPI    Since last seen in the office, the patient reports that her left thumb is much better with no drainage or pus.  Patient states that she has continued taking Augmentin ABX and continues cleaning the incision with H2O2 and covering with a band aid. Patient denies any fever, chills, CP, SOB or calf pain. The patient denies any new illness or injuries to report since last seen in the office. There are no reported changes in medications, allergies, social or family history. Ghassan Rivera has been experiencing pain, discomfort and associated symptoms and has tried modalities of treatment and/or self treatment confirmed as outlined in the pain assessment below. Pain Assessment  7/21/2021   Location of Pain Finger   Pain Location Comment thumb   Location Modifiers Left   Severity of Pain 3   Quality of Pain Dull   Duration of Pain -   Frequency of Pain Intermittent   Aggravating Factors Bending   Aggravating Factors Comment -   Limiting Behavior -   Relieving Factors -   Relieving Factors Comment -          Ghassan Rivera  has a past medical history of Asthma, Cancer ([address] and Chronic pain of left thumb. Other than previously noted, patient reports no changes in medications, allergies, social or family history. REVIEW OF SYSTEMS:                  All Below are Negative except as indicated in the HPI: See HPI                Constitutional: negative for fever, chills, night sweats and unexpected weight loss and malaise/fatigue              HEENT: Negative. No hoarseness, no double vision              Respiratory: Negative.  No difficulty breathing, No SOB              Cardiovascular: negative for chest pain, claudication, PRESTON. (-) Leg swelling               Gastrointestinal: Negative for pain, Blood in stool, incontinence, pelvic pain, N/V/C/D              Genitourinary: No saddle anesthesia, pelvic pain, blood in urine, incontinence, dysuria               Neurological: Negative for dizziness and weakness, visual changes, confusion, headaches, seizures               Phychiatric/Behavioral: Negative for depression, memory loss, substance abuse               Extremities: Negative for hair changes, rash, or skin lesion changes              Hematologic: Negative for bleeding problems, bruising, pallor or swollen lymph nodes              Peripheral Vascular: No calf pain, no circulation deficits              Musculoskeletal: As per HPI above      PHYSICAL EXAM:        Visit Vitals  Pulse 83   Resp 15   Ht 5' (1.524 m)   Wt 234 lb (106.1 kg)   SpO2 99%   BMI 45.70 kg/m²         Constitutional: [x] Alert, cooperative, appears well-developed and well-nourished [x] No apparent distress      [] Abnormal - Body mass index is 45.7 kg/m². makes the treatment plan more complex     Mental status: [x] Alert and awake  [x] Oriented to person/place/time   [x] Normal mood/behavior/speech   [x] Normal dress/motor activity/thought processes/memory      [x] Able to follow commands    [] Abnormal - Dementia    Eyes:   EOM    [x]  Normal    [] Abnormal -   Sclera  [x]  Normal    [] Abnormal -          Discharge [x]  None visible   [] Abnormal -     HENT: [x] Normocephalic, atraumatic  [] Abnormal -   [x] Mouth/Throat: Mucous membranes are moist    External Ears [x] Normal, hearing intact  [] Abnormal -    Neck: [x] Supple.  No visualized mass, normal ROM [] Abnormal -     Pulmonary/Chest: [x] Respiratory effort normal   [x] No visualized signs of difficulty breathing or respiratory distress        [] Abnormal -        Cardiovascular/    [x] Normal pulses to each foot  [] Abnormal -   Peripheral Vascular:    Neurological:        [x] No Facial Asymmetry (Cranial nerve 7 motor function) (limited exam due to video visit) [x] No gross defects         [x] No gaze palsy        [] Abnormal -          Skin:        [x] No significant exanthematous lesions or discoloration noted on facial skin or visible areas       [] Abnormal -            Psychiatric:       [x] Normal Affect, mood, judgement, behavior, conduct [] Abnormal -        [x] No Hallucinations      Musculoskeletal:   [x] Normal gait with no signs of ataxia         [x] Normal range of motion of neck        [] Abnormal -       MUSCULOSKELETAL: EXAMINATION OF:     HAND left    DRESSINGS: CDI   DRAINAGE: none   INCISION: Incision looks good, skin well approximated, no dehiscence, nylon sutures in place without disruption. SKIN: Mild edema, no erythema, mild ecchymosis to nail/cuticle interface, no warmth. No rash or skin lesions. No signs of infection or cellulitis present. TENDERNESS:  Mild tenderness to palpation thumb  NEUROVASCULAR:  Grossly intact. Motor/Sensory: NL/NL. Positive distal pulses and capillary refill. DVT ASSESSMENT:  The calf is not tender to palpation. No evidence of DVT seen on physical exam.  LYMPHATICS: No extremity lymphedema, No calf swelling, no tenderness to calf muscles  JOINT MOTION: Limited at IP region. There is pain-free range of motion of adjacent joints. Negative joint effusion  JOINT/TENDON STABILITY: No Ankle or Subtalar instability or joint laxity. No peroneal sublux ability or dislocation  ALIGNMENT: Forefoot, Midfoot, Hindfoot WNL. DEFORMITY: None present       An electronic signature was used to authenticate this note. -- Mel Goldberg. Paola Phalen [facility], CHRIS SMITH  7/21/2021      Disclaimer: Sections of this note may have been dictated utilizing voice recognition software, which may have resulted in some phonetic based errors in grammar and contents. Even though attempts were made to correct all the mistakes, some may have been missed, and remained in the body of the document. If questions arise, please contact our department.        RADIOGRAPHS & DIAGNOSTIC STUDIES     None    7/21/2021  Herson Mckay PA-C

## 2021-07-28 ENCOUNTER — PATIENT OUTREACH (OUTPATIENT)
Dept: CASE MANAGEMENT | Age: 26
End: 2021-07-28

## 2021-07-28 NOTE — PROGRESS NOTES
Care Transitions Follow Up Call    Challenges to be reviewed by the provider   Additional needs identified to be addressed with provider: no  none           Method of communication with provider : none    Care Transition Nurse (CTN) contacted the patient by telephone to follow up after admission on 2021. Verified name and  with patient as identifiers. Addressed changes since last contact: none  Follow up appointment completed? yes. Was follow up appointment scheduled within 7 days of discharge? yes. Advance Care Planning:   Does patient have an Advance Directive: decision makers updated. CTN reviewed discharge instructions, medical action plan and red flags with patient and discussed any barriers to care and/or understanding of plan of care after discharge. Discussed appropriate site of care based on symptoms and resources available to patient including: PCP, Specialist and [address]. The patient agrees to contact the PCP office for questions related to their healthcare. Patients top risk factors for readmission: medical condition-healing from hand surgery, breast cancer and support system   Interventions to address risk factors: Scheduled appointment with [facility] and Scheduled appointment with [id number]-7/30/2021    [facility] follow up appointment(s):   Future Appointments   Date Time Provider Jose L Angeles   2021  2:45 PM Jerilyn Adkins PA-C [facility]   2021  1:00 PM [facility] ECHO ROOM [facility]     Non-[facility] follow up appointment(s): n/a    CTN provided contact information for future needs. Plan for follow-up call in 7-10 days based on severity of symptoms and risk factors. Plan for next call: self management-ensure patient can do ADL's without difficulty and care for her self as she heals from surgery and has chemo     Goals Addressed                 This Visit's Progress     hospital 30 day readmit        1. CTN will monitor X 4 weeks    2.  Ensure provider appt is scheduled within 7 days post-discharge 7/7/2021 Patient has follow up with ortho and also PCP on 7/14/2021.    3. Confirm patient attended post-discharge provider apt 7/28/2021 Patient had follow up with ortho and has another this Fri. Patient stated that she hopes to get stitches out. 4. Complete post-visit call to confirm attendance and update care needs  7/7/2021 Patient stated that she is doing well today. Just a little bit of pain. No care needs noted. 7/14/2021 Patient stated that she is doing well. Getting better each day. 7/28/2021 Patient stated that things are going pretty well for her except due to chemo her thumb nail is lifting. She is keeping it covered so she does not rip it totally off. No care needs noted. 5. Review/educate common or potential \"red flags\" of condition worsening 7/7/2021 Reviewed signs and symptoms of infection such as temperature greater than 100, confusion, fever and chills, decreased urination, rapid pulse, diarrhea, nausea, skin hot to touch, drainage from surgical site and aching muscles to name a few. 6. Evaluate adherence to medications and priority barriers to resolve  7/7/2021 Patient stated that she has all meds and is taking as prescribed. 7/14/2021 Patient stated that she has all meds and is taking as directed. 7/28/2021 Patient has all meds and is taking as instructed. 7. Instruct on adherence to medications as ordered and assess for therapeutic response and side-effects   7/7/2021 Patient is aware of why she is taking meds and knows when to call physician with issues. 7/14/2021 Patient expressed no concerns with her meds at present time. 7/28/2021 Patient has no concerns about the meds that she is taking. 8. Discuss and evaluate ADL performance. Provide recommendations on energy conservation, particularly related to transition home from an inpatient admission. 7/7/2021 Patient does not use assistive devices.  She rests as needed so she can recover. She is able to complete her ADL's. 7/14/2021 Patient stated that she is healing well. Getting stronger all the time. 7/28/2021 Patient stated that she is doing well over all. She hopes to get stiches out of thumb Fri. She stated that due to chemo-her thumb nail has lifted. She is keeping it covered and watches what she does so it doesn't pull up all the way. She rests as needed between activities.

## 2021-07-30 ENCOUNTER — OFFICE VISIT (OUTPATIENT)
Dept: ORTHOPEDIC SURGERY | Age: 26
End: 2021-07-30
Payer: MEDICAID

## 2021-07-30 VITALS
WEIGHT: 233.8 LBS | OXYGEN SATURATION: 100 % | BODY MASS INDEX: 45.9 KG/M2 | TEMPERATURE: 96.1 F | HEIGHT: 60 IN | HEART RATE: 66 BPM | RESPIRATION RATE: 16 BRPM

## 2021-07-30 DIAGNOSIS — Z98.890 POST-OPERATIVE STATE: ICD-10-CM

## 2021-07-30 DIAGNOSIS — L03.012 PARONYCHIA OF FINGER OF LEFT HAND: Primary | ICD-10-CM

## 2021-07-30 DIAGNOSIS — L03.011 FELON OF FINGER OF RIGHT HAND: ICD-10-CM

## 2021-07-30 PROCEDURE — 99024 POSTOP FOLLOW-UP VISIT: CPT | Performed by: PHYSICIAN ASSISTANT

## 2021-07-30 NOTE — PATIENT INSTRUCTIONS
· Modify activity as instructed. Continue to clean and dress the incision.  Cover with band aid when out in public  · Follow RICE protocol: Rest, Ice (not directly on the skin) and Elevate   · Take antiinflammatory medication as directed, (if tolerated)  · Take Prilosec or Pepcid while taking any antiinflammatory (if tolerated) medication  · Maintain proper nutrition  · Band aids provided  · Please follow up as instructed or sooner as needed

## 2021-07-30 NOTE — PROGRESS NOTES
Nadira Campbell (1995) is a 31 y.o. female, established patient, 25 days s/p Incision And Drainage Left Thumb - Left completed on 7/5/2021, and she is here for evaluation of the following chief complaint(s):    Chief Complaint   Patient presents with    Hand Pain     left thumb          ASSESSMENT & PLAN:     Diagnoses and all orders for this visit:    1. Paronychia of finger of left hand    2. Felon of finger of right hand    3. Post-operative state           ICD-10-CM ICD-9-CM   1. Paronychia of finger of left hand  L03.012 681.02   2. Felon of finger of right hand  L03.011 681.01   3. Post-operative state  Z98.890 V45.89       Patient Instructions       · Sutures removed in the office today  · Work note provided for no work tonight  · Modify activity as instructed. Continue to clean and dress the incision. Cover with band aid when out in public  · Follow RICE protocol: Rest, Ice (not directly on the skin) and Elevate   · Take antiinflammatory medication as directed, (if tolerated)  · Take Prilosec or Pepcid while taking any antiinflammatory (if tolerated) medication  · Maintain proper nutrition  · Band aids provided  · Please follow up as instructed or sooner as needed      Follow-up and Dispositions    · Return in about 18 days (around 8/17/2021) for follow up evaluation. Patient expresses understanding of the diagnosis and treatment plan. Patient education has been provided. Nadira Campbell may have a reminder for a \"due or due soon\" health maintenance. I have asked that she contact her primary care provider for follow-up on this health maintenance.  was reviewed by Jesus Alexander PA-C on 7/30/2021. SUBJECTIVE & OBJECTIVE:     HPI    Since last seen in the office, the patient reports that her left thumb is much better with no drainage or pus.  Patient states that she has completed taking Augmentin ABX and continues cleaning the incision with H2O2 and covering with a band aid. Patient denies any fever, chills, CP, SOB or calf pain. The patient denies any new illness or injuries to report since last seen in the office. There are no reported changes in medications, allergies, social or family history. David Head has been experiencing pain, discomfort and associated symptoms and has tried modalities of treatment and/or self treatment confirmed as outlined in the pain assessment below. Pain Assessment  7/30/2021   Location of Pain Hand   Pain Location Comment -   Location Modifiers Left   Severity of Pain 0   Quality of Pain -   Duration of Pain -   Frequency of Pain -   Aggravating Factors -   Aggravating Factors Comment -   Limiting Behavior -   Relieving Factors -   Relieving Factors Comment -          David Head  has a past medical history of Asthma, Cancer ([address] and Chronic pain of left thumb. Other than previously noted, patient reports no changes in medications, allergies, social or family history. REVIEW OF SYSTEMS:                  All Below are Negative except as indicated in the HPI: See HPI                Constitutional: negative for fever, chills, night sweats and unexpected weight loss and malaise/fatigue              HEENT: Negative. No hoarseness, no double vision              Respiratory: Negative.  No difficulty breathing, No SOB              Cardiovascular: negative for chest pain, claudication, PRESTON. (-) Leg swelling               Gastrointestinal: Negative for pain, Blood in stool, incontinence, pelvic pain, N/V/C/D              Genitourinary: No saddle anesthesia, pelvic pain, blood in urine, incontinence, dysuria               Neurological: Negative for dizziness and weakness, visual changes, confusion, headaches, seizures               Phychiatric/Behavioral: Negative for depression, memory loss, substance abuse               Extremities: Negative for hair changes, rash, or skin lesion changes              Hematologic: Negative for bleeding problems, bruising, pallor or swollen lymph nodes              Peripheral Vascular: No calf pain, no circulation deficits              Musculoskeletal: As per HPI above      PHYSICAL EXAM:        Visit Vitals  Pulse 66   Temp (!) 96.1 °F (35.6 °C) (Temporal)   Resp 16   Ht 5' (1.524 m)   Wt 233 lb 12.8 oz (106.1 kg)   SpO2 100%   BMI 45.66 kg/m²         Constitutional: [x] Alert, cooperative, appears well-developed and well-nourished [x] No apparent distress      [] Abnormal - Body mass index is 45.66 kg/m². makes the treatment plan more complex     Mental status: [x] Alert and awake  [x] Oriented to person/place/time   [x] Normal mood/behavior/speech   [x] Normal dress/motor activity/thought processes/memory      [x] Able to follow commands    [] Abnormal - Dementia    Eyes:   EOM    [x]  Normal    [] Abnormal -   Sclera  [x]  Normal    [] Abnormal -          Discharge [x]  None visible   [] Abnormal -     HENT: [x] Normocephalic, atraumatic  [] Abnormal -   [x] Mouth/Throat: Mucous membranes are moist    External Ears [x] Normal, hearing intact  [] Abnormal -    Neck: [x] Supple.  No visualized mass, normal ROM [] Abnormal -     Pulmonary/Chest: [x] Respiratory effort normal   [x] No visualized signs of difficulty breathing or respiratory distress        [] Abnormal -      Cardiovascular/    [x] Normal pulses to each foot  [] Abnormal -   Peripheral Vascular:    Neurological:        [x] No Facial Asymmetry (Cranial nerve 7 motor function) (limited exam due to video visit) [x] No gross defects         [x] No gaze palsy        [] Abnormal -          Skin:        [x] No significant exanthematous lesions or discoloration noted on facial skin or visible areas       [] Abnormal -            Psychiatric:       [x] Normal Affect, mood, judgement, behavior, conduct [] Abnormal -        [x] No Hallucinations      Musculoskeletal:   [x] Normal gait with no signs of ataxia         [x] Normal range of motion of neck        [] Abnormal -       MUSCULOSKELETAL: EXAMINATION OF:     HAND left    DRESSINGS: CDI   DRAINAGE: none   INCISION: Incision looks good, skin well approximated, no dehiscence, nylon sutures in place without disruption. SKIN: Mild edema, no erythema, mild ecchymosis to nail/cuticle interface, no warmth. No rash or skin lesions. No signs of infection or cellulitis present. TENDERNESS:  Mild tenderness to palpation thumb  NEUROVASCULAR:  Grossly intact. Motor/Sensory: NL/NL. Positive distal pulses and capillary refill. DVT ASSESSMENT:  The calf is not tender to palpation. No evidence of DVT seen on physical exam.  LYMPHATICS: No extremity lymphedema, No calf swelling, no tenderness to calf muscles  JOINT MOTION: Limited at IP region. There is pain-free range of motion of adjacent joints. Negative joint effusion  JOINT/TENDON STABILITY: No Ankle or Subtalar instability or joint laxity. No peroneal sublux ability or dislocation  ALIGNMENT: Forefoot, Midfoot, Hindfoot WNL. DEFORMITY: None present       An electronic signature was used to authenticate this note. -- Chase Barker. [facility], [facility] SM[facility]  7/30/2021      Disclaimer: Sections of this note may have been dictated utilizing voice recognition software, which may have resulted in some phonetic based errors in grammar and contents. Even though attempts were made to correct all the mistakes, some may have been missed, and remained in the body of the document. If questions arise, please contact our department.        RADIOGRAPHS & DIAGNOSTIC STUDIES     None    7/30/2021  Tung Ramirez PA-C

## 2021-07-30 NOTE — LETTER
NOTIFICATION RETURN TO WORK / SCHOOL    7/30/2021 3:04 PM    Ms. Tono Calzada  [address] [facility] [address]      To Whom It May Concern:    Tono Calzada is currently under the care of [address] [facility] Tulio Valdes. Please excuse her from work today. If there are questions or concerns please have the patient contact our office.         Sincerely,      Irena Wang PA-C

## 2021-08-06 ENCOUNTER — PATIENT OUTREACH (OUTPATIENT)
Dept: CASE MANAGEMENT | Age: 26
End: 2021-08-06

## 2021-08-06 NOTE — PROGRESS NOTES
Patient has graduated from the Transitions of Care Coordination  program on 8/6/2021. Patient's symptoms are stable at this time. Patient/family has the ability to self-manage. Care management goals have been completed at this time. No further care transitions nurse follow up scheduled. Goals Addressed                 This Visit's Progress     COMPLETED: hospital 30 day readmit        1. CTN will monitor X 4 weeks    2. Ensure provider appt is scheduled within 7 days post-discharge 7/7/2021 Patient has follow up with ortho and also PCP on 7/14/2021.8/6/2021 Patient had ortho follow up    3. Confirm patient attended post-discharge provider apt 7/28/2021 Patient had follow up with ortho and has another this Fri. Patient stated that she hopes to get stitches out. 8/6/2021 Patient doing well. Dr pleased with results. 4. Complete post-visit call to confirm attendance and update care needs  7/7/2021 Patient stated that she is doing well today. Just a little bit of pain. No care needs noted. 7/14/2021 Patient stated that she is doing well. Getting better each day. 7/28/2021 Patient stated that things are going pretty well for her except due to chemo her thumb nail is lifting. She is keeping it covered so she does not rip it totally off. No care needs noted. 8/6/2021 Patient stated that she is doing well. She is doing what she needs to do     5. Review/educate common or potential \"red flags\" of condition worsening 7/7/2021 Reviewed signs and symptoms of infection such as temperature greater than 100, confusion, fever and chills, decreased urination, rapid pulse, diarrhea, nausea, skin hot to touch, drainage from surgical site and aching muscles to name a few. 6. Evaluate adherence to medications and priority barriers to resolve  7/7/2021 Patient stated that she has all meds and is taking as prescribed. 7/14/2021 Patient stated that she has all meds and is taking as directed.  7/28/2021 Patient has all meds and is taking as instructed. 8/6/2021 patient continues to have all meds and is taking as directed. 7. Instruct on adherence to medications as ordered and assess for therapeutic response and side-effects   7/7/2021 Patient is aware of why she is taking meds and knows when to call physician with issues. 7/14/2021 Patient expressed no concerns with her meds at present time. 7/28/2021 Patient has no concerns about the meds that she is taking. 8/6/2021 No concerns noted. She takes each without difficulty. 8. Discuss and evaluate ADL performance. Provide recommendations on energy conservation, particularly related to transition home from an inpatient admission. 7/7/2021 Patient does not use assistive devices. She rests as needed so she can recover. She is able to complete her ADL's. 7/14/2021 Patient stated that she is healing well. Getting stronger all the time. 7/28/2021 Patient stated that she is doing well over all. She hopes to get stiches out of thumb Fri. She stated that due to chemo-her thumb nail has lifted. She is keeping it covered and watches what she does so it doesn't pull up all the way. She rests as needed between activities. 8/6/2021 Patient is doing well. No complaints. Patient has care transitions nurse contact information for any further questions, concerns, or needs.   Patient's upcoming visits:    Future Appointments   Date Time Provider Jose L Angeles   8/12/2021  1:00 PM [facility] ROOM [address]   8/17/2021  1:00 PM CHRIS Burton AMB

## 2021-08-17 ENCOUNTER — OFFICE VISIT (OUTPATIENT)
Dept: ORTHOPEDIC SURGERY | Age: 26
End: 2021-08-17
Payer: MEDICAID

## 2021-08-17 VITALS
BODY MASS INDEX: 45.16 KG/M2 | HEIGHT: 60 IN | HEART RATE: 62 BPM | OXYGEN SATURATION: 99 % | TEMPERATURE: 97.1 F | RESPIRATION RATE: 18 BRPM | WEIGHT: 230 LBS

## 2021-08-17 DIAGNOSIS — Z98.890 POST-OPERATIVE STATE: ICD-10-CM

## 2021-08-17 DIAGNOSIS — L03.011 FELON OF FINGER OF RIGHT HAND: ICD-10-CM

## 2021-08-17 DIAGNOSIS — M24.642: ICD-10-CM

## 2021-08-17 DIAGNOSIS — L03.012 PARONYCHIA OF FINGER OF LEFT HAND: Primary | ICD-10-CM

## 2021-08-17 PROCEDURE — 99024 POSTOP FOLLOW-UP VISIT: CPT | Performed by: PHYSICIAN ASSISTANT

## 2021-08-17 PROCEDURE — 73130 X-RAY EXAM OF HAND: CPT | Performed by: PHYSICIAN ASSISTANT

## 2021-08-17 NOTE — PATIENT INSTRUCTIONS
· Modify activity as instructed. Continue to clean and dress the incision. Cover with band aid when out in public  · Follow RICE protocol: Rest, Ice (not directly on the skin) and Elevate   · Take antiinflammatory medication as directed, (if tolerated)  · Take Prilosec or Pepcid while taking any antiinflammatory (if tolerated) medication  · Maintain proper nutrition  · Perform finger exercises as instructed - PT has been ordered  · Band aids provided  · Please follow up as instructed or sooner as needed      If you have a reminder for a \"due or due soon\" health maintenance, please contact your primary care provider for follow-up on this health maintenance. Maile Mota, [facility], MPA, PA-C  8/17/2021  1:16 PM

## 2021-08-17 NOTE — PROGRESS NOTES
Elsa Mohr (1995) is a 31 y.o. female, established patient, 43 days s/p Incision And Drainage Left Thumb - Left completed on 7/5/2021, and she is here for evaluation of the following chief complaint(s):    Chief Complaint   Patient presents with    Hand Pain     left thumb post op f/u          ASSESSMENT & PLAN:     Diagnoses and all orders for this visit:    1. Paronychia of finger of left hand    2. Felon of finger of right hand    3. Post-operative state           ICD-10-CM ICD-9-CM   1. Paronychia of finger of left hand  L03.012 681.02   2. Felon of finger of right hand  L03.011 681.01   3. Post-operative state  Z98.890 V45.89       Patient Instructions       · Modify activity as instructed. Continue to clean and dress the incision. Cover with band aid when out in public  · Follow RICE protocol: Rest, Ice (not directly on the skin) and Elevate   · Take antiinflammatory medication as directed, (if tolerated)  · Take Prilosec or Pepcid while taking any antiinflammatory (if tolerated) medication  · Maintain proper nutrition  · Perform finger exercises as instructed - PT has been ordered  · Band aids provided  · Please follow up after you have recovered from your upcoming surgery on 8/26/21 or sooner as needed        Patient expresses understanding of the diagnosis and treatment plan. Patient education has been provided. Elsa Mohr may have a reminder for a \"due or due soon\" health maintenance. I have asked that she contact her primary care provider for follow-up on this health maintenance.  was reviewed by Rachel Zeng PA-C on 8/17/2021. SUBJECTIVE & OBJECTIVE:     HPI    Since last seen in the office, the patient reports that her left thumb is much better with no drainage or pus. Patient states that she can extend her thumb, but has difficulty bending it down. Patient states that she is scheduled for surgery on 8/26/21 for a double mastectomy.   Patient denies any fever, chills, CP, SOB or calf pain. The patient denies any new illness or injuries to report since last seen in the office. There are no reported changes in medications, allergies, social or family history. Michelle Mcclain has been experiencing pain, discomfort and associated symptoms and has tried modalities of treatment and/or self treatment confirmed as outlined in the pain assessment below. Pain Assessment  8/17/2021   Location of Pain Finger   Pain Location Comment -   Location Modifiers Left   Severity of Pain 0   Quality of Pain Aching   Duration of Pain A few minutes   Frequency of Pain Rarely   Aggravating Factors Bending   Aggravating Factors Comment -   Limiting Behavior No   Relieving Factors Rest;NSAID   Relieving Factors Comment -          Michelle Mcclain  has a past medical history of Asthma, Cancer ([address] and Chronic pain of left thumb. Other than previously noted, patient reports no changes in medications, allergies, social or family history. REVIEW OF SYSTEMS:                  All Below are Negative except as indicated in the HPI: See HPI                Constitutional: negative for fever, chills, night sweats and unexpected weight loss and malaise/fatigue              HEENT: Negative. No hoarseness, no double vision              Respiratory: Negative.  No difficulty breathing, No SOB              Cardiovascular: negative for chest pain, claudication, PRESTON. (-) Leg swelling               Gastrointestinal: Negative for pain, Blood in stool, incontinence, pelvic pain, N/V/C/D              Genitourinary: No saddle anesthesia, pelvic pain, blood in urine, incontinence, dysuria               Neurological: Negative for dizziness and weakness, visual changes, confusion, headaches, seizures               Phychiatric/Behavioral: Negative for depression, memory loss, substance abuse               Extremities: Negative for hair changes, rash, or skin lesion changes              Hematologic: Negative for bleeding problems, bruising, pallor or swollen lymph nodes              Peripheral Vascular: No calf pain, no circulation deficits              Musculoskeletal: As per HPI above      PHYSICAL EXAM:        Visit Vitals  Pulse 62   Temp 97.1 °F (36.2 °C) (Temporal)   Resp 18   Ht 5' (1.524 m)   Wt 230 lb (104.3 kg)   SpO2 99%   BMI 44.92 kg/m²         Constitutional: [x] Alert, cooperative, appears well-developed and well-nourished [x] No apparent distress      [] Abnormal - Body mass index is 44.92 kg/m². makes the treatment plan more complex     Mental status: [x] Alert and awake  [x] Oriented to person/place/time   [x] Normal mood/behavior/speech   [x] Normal dress/motor activity/thought processes/memory      [x] Able to follow commands    [] Abnormal - Dementia    Eyes:   EOM    [x]  Normal    [] Abnormal -   Sclera  [x]  Normal    [] Abnormal -          Discharge [x]  None visible   [] Abnormal -     HENT: [x] Normocephalic, atraumatic  [] Abnormal -   [x] Mouth/Throat: Mucous membranes are moist    External Ears [x] Normal, hearing intact  [] Abnormal -    Neck: [x] Supple.  No visualized mass, normal ROM [] Abnormal -     Pulmonary/Chest: [x] Respiratory effort normal   [x] No visualized signs of difficulty breathing or respiratory distress        [] Abnormal -      Cardiovascular/    [x] Normal pulses to each foot  [] Abnormal -   Peripheral Vascular:    Neurological:        [x] No Facial Asymmetry (Cranial nerve 7 motor function) (limited exam due to video visit) [x] No gross defects         [x] No gaze palsy        [] Abnormal -          Skin:        [x] No significant exanthematous lesions or discoloration noted on facial skin or visible areas       [] Abnormal -            Psychiatric:       [x] Normal Affect, mood, judgement, behavior, conduct [] Abnormal -        [x] No Hallucinations      Musculoskeletal:   [x] Normal gait with no signs of ataxia         [x] Normal range of motion of neck        [] Abnormal -       MUSCULOSKELETAL: EXAMINATION OF:     HAND left    DRAINAGE: none   INCISION: Incision looks good, skin well approximated, no dehiscence  SKIN: Mild edema, no erythema, mild ecchymosis to nail/cuticle interface, no warmth. No rash or skin lesions. No signs of infection or cellulitis present. TENDERNESS:  Mild tenderness to palpation thumb  NEUROVASCULAR:  Grossly intact. Motor/Sensory: NL/NL. Positive distal pulses and capillary refill. DVT ASSESSMENT:  The calf is not tender to palpation. No evidence of DVT seen on physical exam.  LYMPHATICS: No extremity lymphedema, No calf swelling, no tenderness to calf muscles  JOINT MOTION: Extensors and flexor mechanism WNL. Limited motion at IP region due to ankylosis. There is pain-free range of motion of adjacent joints. Negative joint effusion  JOINT/TENDON STABILITY: No Ankle or Subtalar instability or joint laxity. No peroneal sublux ability or dislocation  ALIGNMENT: Forefoot, Midfoot, Hindfoot WNL. DEFORMITY: None present       An electronic signature was used to authenticate this note. -- Sara Patricio. [facility], LUIS, PA-C  8/17/2021      Disclaimer: Sections of this note may have been dictated utilizing voice recognition software, which may have resulted in some phonetic based errors in grammar and contents. Even though attempts were made to correct all the mistakes, some may have been missed, and remained in the body of the document. If questions arise, please contact our department. RADIOGRAPHS & DIAGNOSTIC STUDIES       Left hand X-RAYS, 3 views (AP, LAT, OBL) completed 8/17/2021 AT [facility]    FINDINGS:    X-rays reveal post op changes s/p  Incision And Drainage Left Thumb - Left. Soft tissue swelling is mild. Degenerative changes are noted. Mineralization suggests no osteopenia. IMPRESSION:    As stated above      I have personally reviewed the results of the above study.  The interpretation of this study is my professional opinion    8/17/2021  My Costello PA-C

## 2021-08-19 LAB
ACID FAST STN SPEC: NEGATIVE
MYCOBACTERIUM SPEC QL CULT: NEGATIVE
SPECIMEN PREPARATION: NORMAL
SPECIMEN SOURCE: NORMAL

## 2021-08-26 PROBLEM — C50.919 BREAST CANCER (HCC): Status: ACTIVE | Noted: 2021-08-26

## 2021-09-15 ENCOUNTER — OFFICE VISIT (OUTPATIENT)
Dept: ORTHOPEDIC SURGERY | Age: 26
End: 2021-09-15
Payer: MEDICAID

## 2021-09-15 VITALS
OXYGEN SATURATION: 98 % | TEMPERATURE: 97.7 F | HEART RATE: 79 BPM | WEIGHT: 228 LBS | HEIGHT: 60 IN | BODY MASS INDEX: 44.76 KG/M2

## 2021-09-15 DIAGNOSIS — L03.012 PARONYCHIA OF FINGER OF LEFT HAND: Primary | ICD-10-CM

## 2021-09-15 DIAGNOSIS — M24.642: ICD-10-CM

## 2021-09-15 DIAGNOSIS — L03.011 FELON OF FINGER OF RIGHT HAND: ICD-10-CM

## 2021-09-15 DIAGNOSIS — Z98.890 POST-OPERATIVE STATE: ICD-10-CM

## 2021-09-15 PROCEDURE — 99024 POSTOP FOLLOW-UP VISIT: CPT | Performed by: PHYSICIAN ASSISTANT

## 2021-09-15 NOTE — PROGRESS NOTES
Roro Sorensen (1995) is a 31 y.o. female, established patient, 72 days s/p Incision And Drainage Left Thumb - Left completed on 7/5/2021, and she is here for evaluation of the following chief complaint(s): Surgical Follow-up (left thumb). ASSESSMENT         ICD-10-CM ICD-9-CM    1. Paronychia of finger of left hand  L03.012 681.02 AMB SUPPLY ORDER   2. Felon of finger of right hand  L03.011 681.01 AMB SUPPLY ORDER   3. Ankylosis of joint of left hand  M24.642 718.54 AMB SUPPLY ORDER   4. Post-operative state  Z98.890 V45.89 AMB SUPPLY ORDER         PLAN       · Continue activity as tolerated  · Take antiinflammatory medication as directed, (if tolerated)  · Take Prilosec or Pepcid while taking any antiinflammatory (if tolerated) medication  · Maintain proper nutrition  · Perform finger exercises as instructed - PT has been placed on hold until after patient recovers from her recent double mastectomy  · Order provided for stress ball  · Please follow as needed      Patient expresses understanding of the diagnosis and treatment plan. Patient education has been provided. Roro Sorensen may have a reminder for a \"due or due soon\" health maintenance. I have asked that she contact her primary care provider for follow-up on this health maintenance.  was reviewed by José Miguel Johnson PA-C on 9/15/2021. SUBJECTIVE & OBJECTIVE:     HPI    Since last seen in the office, the patient reports that her left thumb is much better with no drainage or pus. Patient states that she can bend her thumb a little better. She states that she was unable to attend PT due to her recent surgery on 8/26/21 for a double mastectomy. Patient denies any fever, chills, CP, SOB or calf pain. The patient denies any new illness or injuries to report since last seen in the office. There are no reported changes in medications, allergies, social or family history.   Roro Sorensen has been experiencing pain, discomfort and associated symptoms and has tried modalities of treatment and/or self treatment confirmed as outlined in the pain assessment below. Pain Assessment  9/15/2021   Location of Pain Finger   Pain Location Comment thumb   Location Modifiers Left   Severity of Pain 0   Quality of Pain -   Duration of Pain -   Frequency of Pain -   Aggravating Factors -   Aggravating Factors Comment -   Limiting Behavior -   Relieving Factors -   Relieving Factors Comment -          [facility]  has a past medical history of Asthma, Cancer ([address] and Chronic pain of left thumb. Other than previously noted, patient reports no changes in medications, allergies, social or family history. REVIEW OF SYSTEMS:                  All Below are Negative except as indicated in the HPI: See HPI                Constitutional: negative for fever, chills, night sweats and unexpected weight loss and malaise/fatigue              HEENT: Negative. No hoarseness, no double vision              Respiratory: Negative.  No difficulty breathing, No SOB              Cardiovascular: negative for chest pain, claudication, PRESTON. (-) Leg swelling               Gastrointestinal: Negative for pain, Blood in stool, incontinence, pelvic pain, N/V/C/D              Genitourinary: No saddle anesthesia, pelvic pain, blood in urine, incontinence, dysuria               Neurological: Negative for dizziness and weakness, visual changes, confusion, headaches, seizures               Phychiatric/Behavioral: Negative for depression, memory loss, substance abuse               Extremities: Negative for hair changes, rash, or skin lesion changes              Hematologic: Negative for bleeding problems, bruising, pallor or swollen lymph nodes              Peripheral Vascular: No calf pain, no circulation deficits              Musculoskeletal: As per HPI above      PHYSICAL EXAM:        Visit Vitals  Pulse 79   Temp 97.7 °F (36.5 °C) (Temporal)   Ht 5' (1.524 m) Wt 228 lb (103.4 kg)   SpO2 98%   BMI 44.53 kg/m²         Constitutional: [x] Alert, cooperative, appears well-developed and well-nourished [x] No apparent distress      [] Abnormal - Body mass index is 44.53 kg/m². makes the treatment plan more complex     Mental status: [x] Alert and awake  [x] Oriented to person/place/time   [x] Normal mood/behavior/speech   [x] Normal dress/motor activity/thought processes/memory      [x] Able to follow commands    [] Abnormal - Dementia    Eyes:   EOM    [x]  Normal    [] Abnormal -   Sclera  [x]  Normal    [] Abnormal -          Discharge [x]  None visible   [] Abnormal -     HENT: [x] Normocephalic, atraumatic  [] Abnormal -   [x] Mouth/Throat: Mucous membranes are moist    External Ears [x] Normal, hearing intact  [] Abnormal -    Neck: [x] Supple. No visualized mass, normal ROM [] Abnormal -     Pulmonary/Chest: [x] Respiratory effort normal   [x] No visualized signs of difficulty breathing or respiratory distress        [] Abnormal -      Cardiovascular/    [x] Normal pulses to each foot  [] Abnormal -   Peripheral Vascular:    Neurological:        [x] No Facial Asymmetry (Cranial nerve 7 motor function) (limited exam due to video visit) [x] No gross defects         [x] No gaze palsy        [] Abnormal -          Skin:        [x] No significant exanthematous lesions or discoloration noted on facial skin or visible areas       [] Abnormal -            Psychiatric:       [x] Normal Affect, mood, judgement, behavior, conduct [] Abnormal -        [x] No Hallucinations      Musculoskeletal:   [x] Normal gait with no signs of ataxia         [x] Normal range of motion of neck        [] Abnormal -       MUSCULOSKELETAL: EXAMINATION OF:     HAND left    DRAINAGE: none   INCISION: Incision looks good, skin well healed  SKIN: Mild edema, no erythema, mild ecchymosis to nail/cuticle interface, no warmth. No rash or skin lesions. No signs of infection or cellulitis present. TENDERNESS:  No tenderness to palpation thumb  NEUROVASCULAR:  Grossly intact. Motor/Sensory: NL/NL. Positive distal pulses and capillary refill. DVT ASSESSMENT:  The calf is not tender to palpation. No evidence of DVT seen on physical exam.  LYMPHATICS: No extremity lymphedema, No calf swelling, no tenderness to calf muscles  JOINT MOTION: Extensors and flexor mechanism WNL. Improved motion at IP region. There is pain-free range of motion of adjacent joints. Negative joint effusion  JOINT/TENDON STABILITY: No Ankle or Subtalar instability or joint laxity. No peroneal sublux ability or dislocation  ALIGNMENT: Forefoot, Midfoot, Hindfoot WNL. DEFORMITY: None present       An electronic signature was used to authenticate this note. -- Parag Muhammad. [facility], CHRIS SMITH  9/15/2021      Disclaimer: Sections of this note may have been dictated utilizing voice recognition software, which may have resulted in some phonetic based errors in grammar and contents. Even though attempts were made to correct all the mistakes, some may have been missed, and remained in the body of the document. If questions arise, please contact our department.        RADIOGRAPHS & DIAGNOSTIC STUDIES       None    9/15/2021  Ana María Nguyen PA-C

## 2022-02-06 ENCOUNTER — APPOINTMENT (OUTPATIENT)
Dept: ULTRASOUND IMAGING | Age: 27
DRG: 263 | End: 2022-02-06
Attending: STUDENT IN AN ORGANIZED HEALTH CARE EDUCATION/TRAINING PROGRAM
Payer: MEDICAID

## 2022-02-06 ENCOUNTER — ANESTHESIA EVENT (OUTPATIENT)
Dept: SURGERY | Age: 27
DRG: 263 | End: 2022-02-06
Payer: MEDICAID

## 2022-02-06 ENCOUNTER — APPOINTMENT (OUTPATIENT)
Dept: CT IMAGING | Age: 27
DRG: 263 | End: 2022-02-06
Attending: STUDENT IN AN ORGANIZED HEALTH CARE EDUCATION/TRAINING PROGRAM
Payer: MEDICAID

## 2022-02-06 ENCOUNTER — HOSPITAL ENCOUNTER (INPATIENT)
Age: 27
LOS: 4 days | Discharge: HOME | DRG: 263 | End: 2022-02-10
Attending: STUDENT IN AN ORGANIZED HEALTH CARE EDUCATION/TRAINING PROGRAM | Admitting: SURGERY
Payer: MEDICAID

## 2022-02-06 DIAGNOSIS — G89.18 POSTOPERATIVE PAIN: ICD-10-CM

## 2022-02-06 DIAGNOSIS — K81.9 CHOLECYSTITIS: Primary | ICD-10-CM

## 2022-02-06 LAB
ABO + RH BLD: NORMAL
ALBUMIN SERPL-MCNC: 3 G/DL (ref 3.4–5)
ALBUMIN/GLOB SERPL: 0.8 {RATIO} (ref 0.8–1.7)
ALP SERPL-CCNC: 172 U/L (ref 45–117)
ALT SERPL-CCNC: 76 U/L (ref 13–56)
ANION GAP SERPL CALC-SCNC: 7 MMOL/L (ref 3–18)
APPEARANCE UR: CLEAR
AST SERPL-CCNC: 84 U/L (ref 10–38)
BACTERIA URNS QL MICRO: ABNORMAL /HPF
BASOPHILS # BLD: 0 K/UL (ref 0–0.1)
BASOPHILS NFR BLD: 1 % (ref 0–2)
BILIRUB SERPL-MCNC: 1.2 MG/DL (ref 0.2–1)
BILIRUB UR QL: NEGATIVE
BLOOD GROUP ANTIBODIES SERPL: NORMAL
BUN SERPL-MCNC: 10 MG/DL (ref 7–18)
BUN/CREAT SERPL: 9 (ref 12–20)
CALCIUM SERPL-MCNC: 8.9 MG/DL (ref 8.5–10.1)
CHLORIDE SERPL-SCNC: 105 MMOL/L (ref 100–111)
CO2 SERPL-SCNC: 26 MMOL/L (ref 21–32)
COLOR UR: YELLOW
COVID-19 RAPID TEST, COVR: NOT DETECTED
CREAT SERPL-MCNC: 1.07 MG/DL (ref 0.6–1.3)
DIFFERENTIAL METHOD BLD: ABNORMAL
EOSINOPHIL # BLD: 0.1 K/UL (ref 0–0.4)
EOSINOPHIL NFR BLD: 6 % (ref 0–5)
EPITH CASTS URNS QL MICRO: ABNORMAL /LPF (ref 0–5)
ERYTHROCYTE [DISTWIDTH] IN BLOOD BY AUTOMATED COUNT: 19.7 % (ref 11.6–14.5)
GLOBULIN SER CALC-MCNC: 3.8 G/DL (ref 2–4)
GLUCOSE BLD STRIP.AUTO-MCNC: 198 MG/DL (ref 70–110)
GLUCOSE SERPL-MCNC: 197 MG/DL (ref 74–99)
GLUCOSE UR STRIP.AUTO-MCNC: NEGATIVE MG/DL
HCG UR QL: NEGATIVE
HCG UR QL: NEGATIVE
HCT VFR BLD AUTO: 32 % (ref 35–45)
HGB BLD-MCNC: 10.2 G/DL (ref 12–16)
HGB UR QL STRIP: ABNORMAL
IMM GRANULOCYTES # BLD AUTO: 0 K/UL
IMM GRANULOCYTES NFR BLD AUTO: 0 %
KETONES UR QL STRIP.AUTO: ABNORMAL MG/DL
LACTATE BLD-SCNC: 3.13 MMOL/L (ref 0.4–2)
LACTATE SERPL-SCNC: 3.9 MMOL/L (ref 0.4–2)
LEUKOCYTE ESTERASE UR QL STRIP.AUTO: NEGATIVE
LIPASE SERPL-CCNC: 99 U/L (ref 73–393)
LYMPHOCYTES # BLD: 1.4 K/UL (ref 0.9–3.6)
LYMPHOCYTES NFR BLD: 74 % (ref 21–52)
MCH RBC QN AUTO: 26.2 PG (ref 24–34)
MCHC RBC AUTO-ENTMCNC: 31.9 G/DL (ref 31–37)
MCV RBC AUTO: 82.1 FL (ref 78–100)
MONOCYTES # BLD: 0.3 K/UL (ref 0.05–1.2)
MONOCYTES NFR BLD: 15 % (ref 3–10)
NEUTS SEG # BLD: 0.1 K/UL (ref 1.8–8)
NEUTS SEG NFR BLD: 4 % (ref 40–73)
NITRITE UR QL STRIP.AUTO: NEGATIVE
NRBC # BLD: 0 K/UL (ref 0–0.01)
NRBC BLD-RTO: 0 PER 100 WBC
PH UR STRIP: 6.5 [PH] (ref 5–8)
PLATELET # BLD AUTO: 371 K/UL (ref 135–420)
PLATELET COMMENTS,PCOM: ABNORMAL
PMV BLD AUTO: 8.7 FL (ref 9.2–11.8)
POTASSIUM SERPL-SCNC: 3 MMOL/L (ref 3.5–5.5)
PROT SERPL-MCNC: 6.8 G/DL (ref 6.4–8.2)
PROT UR STRIP-MCNC: 100 MG/DL
RBC # BLD AUTO: 3.9 M/UL (ref 4.2–5.3)
RBC #/AREA URNS HPF: ABNORMAL /HPF (ref 0–5)
RBC MORPH BLD: ABNORMAL
RBC MORPH BLD: ABNORMAL
SODIUM SERPL-SCNC: 138 MMOL/L (ref 136–145)
SOURCE, COVRS: NORMAL
SP GR UR REFRACTOMETRY: 1.01 (ref 1–1.03)
SPECIMEN EXP DATE BLD: NORMAL
UROBILINOGEN UR QL STRIP.AUTO: 2 EU/DL (ref 0.2–1)
WBC # BLD AUTO: 1.9 K/UL (ref 4.6–13.2)
WBC URNS QL MICRO: ABNORMAL /HPF (ref 0–4)
YEAST URNS QL MICRO: ABNORMAL

## 2022-02-06 PROCEDURE — 96367 TX/PROPH/DG ADDL SEQ IV INF: CPT

## 2022-02-06 PROCEDURE — 96365 THER/PROPH/DIAG IV INF INIT: CPT

## 2022-02-06 PROCEDURE — 74011000250 HC RX REV CODE- 250: Performed by: SURGERY

## 2022-02-06 PROCEDURE — 83690 ASSAY OF LIPASE: CPT

## 2022-02-06 PROCEDURE — 74011000254 HC RX REV CODE- 254: Performed by: SURGERY

## 2022-02-06 PROCEDURE — 86900 BLOOD TYPING SEROLOGIC ABO: CPT

## 2022-02-06 PROCEDURE — 74011000636 HC RX REV CODE- 636: Performed by: STUDENT IN AN ORGANIZED HEALTH CARE EDUCATION/TRAINING PROGRAM

## 2022-02-06 PROCEDURE — 74011250636 HC RX REV CODE- 250/636: Performed by: STUDENT IN AN ORGANIZED HEALTH CARE EDUCATION/TRAINING PROGRAM

## 2022-02-06 PROCEDURE — 80053 COMPREHEN METABOLIC PANEL: CPT

## 2022-02-06 PROCEDURE — 96375 TX/PRO/DX INJ NEW DRUG ADDON: CPT

## 2022-02-06 PROCEDURE — 82962 GLUCOSE BLOOD TEST: CPT

## 2022-02-06 PROCEDURE — 81025 URINE PREGNANCY TEST: CPT

## 2022-02-06 PROCEDURE — 85025 COMPLETE CBC W/AUTO DIFF WBC: CPT

## 2022-02-06 PROCEDURE — 87040 BLOOD CULTURE FOR BACTERIA: CPT

## 2022-02-06 PROCEDURE — 74011250637 HC RX REV CODE- 250/637: Performed by: NURSE ANESTHETIST, CERTIFIED REGISTERED

## 2022-02-06 PROCEDURE — 87635 SARS-COV-2 COVID-19 AMP PRB: CPT

## 2022-02-06 PROCEDURE — 99285 EMERGENCY DEPT VISIT HI MDM: CPT

## 2022-02-06 PROCEDURE — 74011250636 HC RX REV CODE- 250/636: Performed by: EMERGENCY MEDICINE

## 2022-02-06 PROCEDURE — 76705 ECHO EXAM OF ABDOMEN: CPT

## 2022-02-06 PROCEDURE — 74177 CT ABD & PELVIS W/CONTRAST: CPT

## 2022-02-06 PROCEDURE — 74011000258 HC RX REV CODE- 258: Performed by: SURGERY

## 2022-02-06 PROCEDURE — 81001 URINALYSIS AUTO W/SCOPE: CPT

## 2022-02-06 PROCEDURE — 83605 ASSAY OF LACTIC ACID: CPT

## 2022-02-06 PROCEDURE — 74011250636 HC RX REV CODE- 250/636: Performed by: SURGERY

## 2022-02-06 PROCEDURE — 74011250637 HC RX REV CODE- 250/637: Performed by: SURGERY

## 2022-02-06 PROCEDURE — 65270000029 HC RM PRIVATE

## 2022-02-06 PROCEDURE — 74011000250 HC RX REV CODE- 250: Performed by: NURSE ANESTHETIST, CERTIFIED REGISTERED

## 2022-02-06 RX ORDER — LEVOFLOXACIN 5 MG/ML
500 INJECTION, SOLUTION INTRAVENOUS EVERY 24 HOURS
Status: DISCONTINUED | OUTPATIENT
Start: 2022-02-07 | End: 2022-02-10 | Stop reason: HOSPADM

## 2022-02-06 RX ORDER — SODIUM CHLORIDE 0.9 % (FLUSH) 0.9 %
5-40 SYRINGE (ML) INJECTION AS NEEDED
Status: DISCONTINUED | OUTPATIENT
Start: 2022-02-06 | End: 2022-02-07 | Stop reason: HOSPADM

## 2022-02-06 RX ORDER — DIPHENHYDRAMINE HYDROCHLORIDE 50 MG/ML
12.5 INJECTION, SOLUTION INTRAMUSCULAR; INTRAVENOUS
Status: DISCONTINUED | OUTPATIENT
Start: 2022-02-06 | End: 2022-02-10 | Stop reason: HOSPADM

## 2022-02-06 RX ORDER — METRONIDAZOLE 500 MG/100ML
500 INJECTION, SOLUTION INTRAVENOUS EVERY 8 HOURS
Status: DISCONTINUED | OUTPATIENT
Start: 2022-02-06 | End: 2022-02-06

## 2022-02-06 RX ORDER — NALOXONE HYDROCHLORIDE 0.4 MG/ML
0.4 INJECTION, SOLUTION INTRAMUSCULAR; INTRAVENOUS; SUBCUTANEOUS AS NEEDED
Status: DISCONTINUED | OUTPATIENT
Start: 2022-02-06 | End: 2022-02-10 | Stop reason: HOSPADM

## 2022-02-06 RX ORDER — METRONIDAZOLE 500 MG/100ML
500 INJECTION, SOLUTION INTRAVENOUS EVERY 12 HOURS
Status: DISCONTINUED | OUTPATIENT
Start: 2022-02-06 | End: 2022-02-08

## 2022-02-06 RX ORDER — LORAZEPAM 2 MG/ML
1 INJECTION INTRAMUSCULAR
Status: DISCONTINUED | OUTPATIENT
Start: 2022-02-06 | End: 2022-02-10 | Stop reason: HOSPADM

## 2022-02-06 RX ORDER — METRONIDAZOLE 500 MG/100ML
500 INJECTION, SOLUTION INTRAVENOUS
Status: COMPLETED | OUTPATIENT
Start: 2022-02-06 | End: 2022-02-06

## 2022-02-06 RX ORDER — METRONIDAZOLE 500 MG/100ML
500 INJECTION, SOLUTION INTRAVENOUS EVERY 12 HOURS
Status: DISCONTINUED | OUTPATIENT
Start: 2022-02-06 | End: 2022-02-06

## 2022-02-06 RX ORDER — ONDANSETRON 2 MG/ML
4 INJECTION INTRAMUSCULAR; INTRAVENOUS
Status: DISCONTINUED | OUTPATIENT
Start: 2022-02-06 | End: 2022-02-10 | Stop reason: HOSPADM

## 2022-02-06 RX ORDER — FAMOTIDINE 20 MG/1
20 TABLET, FILM COATED ORAL ONCE
Status: COMPLETED | OUTPATIENT
Start: 2022-02-06 | End: 2022-02-06

## 2022-02-06 RX ORDER — VANCOMYCIN/0.9 % SOD CHLORIDE 1.5G/250ML
1500 PLASTIC BAG, INJECTION (ML) INTRAVENOUS EVERY 24 HOURS
Status: DISCONTINUED | OUTPATIENT
Start: 2022-02-07 | End: 2022-02-06

## 2022-02-06 RX ORDER — SODIUM CHLORIDE 0.9 % (FLUSH) 0.9 %
5-40 SYRINGE (ML) INJECTION AS NEEDED
Status: DISCONTINUED | OUTPATIENT
Start: 2022-02-06 | End: 2022-02-10 | Stop reason: HOSPADM

## 2022-02-06 RX ORDER — POTASSIUM CHLORIDE 7.45 MG/ML
10 INJECTION INTRAVENOUS
Status: COMPLETED | OUTPATIENT
Start: 2022-02-06 | End: 2022-02-06

## 2022-02-06 RX ORDER — SODIUM CHLORIDE, SODIUM LACTATE, POTASSIUM CHLORIDE, CALCIUM CHLORIDE 600; 310; 30; 20 MG/100ML; MG/100ML; MG/100ML; MG/100ML
75 INJECTION, SOLUTION INTRAVENOUS CONTINUOUS
Status: DISCONTINUED | OUTPATIENT
Start: 2022-02-06 | End: 2022-02-06

## 2022-02-06 RX ORDER — FACIAL-BODY WIPES
10 EACH TOPICAL DAILY PRN
Status: DISCONTINUED | OUTPATIENT
Start: 2022-02-06 | End: 2022-02-10 | Stop reason: HOSPADM

## 2022-02-06 RX ORDER — MORPHINE SULFATE 4 MG/ML
4 INJECTION INTRAVENOUS
Status: COMPLETED | OUTPATIENT
Start: 2022-02-06 | End: 2022-02-06

## 2022-02-06 RX ORDER — ENOXAPARIN SODIUM 100 MG/ML
40 INJECTION SUBCUTANEOUS DAILY
Status: DISCONTINUED | OUTPATIENT
Start: 2022-02-06 | End: 2022-02-10 | Stop reason: HOSPADM

## 2022-02-06 RX ORDER — LEVOFLOXACIN 5 MG/ML
750 INJECTION, SOLUTION INTRAVENOUS ONCE
Status: COMPLETED | OUTPATIENT
Start: 2022-02-06 | End: 2022-02-06

## 2022-02-06 RX ORDER — MORPHINE SULFATE 4 MG/ML
4 INJECTION INTRAVENOUS
Status: DISCONTINUED | OUTPATIENT
Start: 2022-02-06 | End: 2022-02-08

## 2022-02-06 RX ORDER — INDOCYANINE GREEN AND WATER 25 MG
3 KIT INJECTION
Status: COMPLETED | OUTPATIENT
Start: 2022-02-06 | End: 2022-02-06

## 2022-02-06 RX ORDER — DIPHENHYDRAMINE HCL 50 MG
50 CAPSULE ORAL
Status: COMPLETED | OUTPATIENT
Start: 2022-02-06 | End: 2022-02-06

## 2022-02-06 RX ORDER — SODIUM CHLORIDE 0.9 % (FLUSH) 0.9 %
5-40 SYRINGE (ML) INJECTION EVERY 8 HOURS
Status: DISCONTINUED | OUTPATIENT
Start: 2022-02-06 | End: 2022-02-10 | Stop reason: HOSPADM

## 2022-02-06 RX ORDER — VANCOMYCIN 2 GRAM/500 ML IN 0.9 % SODIUM CHLORIDE INTRAVENOUS
2000 ONCE
Status: COMPLETED | OUTPATIENT
Start: 2022-02-06 | End: 2022-02-06

## 2022-02-06 RX ORDER — DEXTROSE MONOHYDRATE AND SODIUM CHLORIDE 5; .9 G/100ML; G/100ML
125 INJECTION, SOLUTION INTRAVENOUS CONTINUOUS
Status: DISCONTINUED | OUTPATIENT
Start: 2022-02-06 | End: 2022-02-09

## 2022-02-06 RX ORDER — SODIUM CHLORIDE 0.9 % (FLUSH) 0.9 %
5-40 SYRINGE (ML) INJECTION EVERY 8 HOURS
Status: DISCONTINUED | OUTPATIENT
Start: 2022-02-06 | End: 2022-02-07 | Stop reason: HOSPADM

## 2022-02-06 RX ORDER — LEVOFLOXACIN 5 MG/ML
500 INJECTION, SOLUTION INTRAVENOUS EVERY 24 HOURS
Status: DISCONTINUED | OUTPATIENT
Start: 2022-02-06 | End: 2022-02-06

## 2022-02-06 RX ADMIN — IOPAMIDOL 100 ML: 612 INJECTION, SOLUTION INTRAVENOUS at 06:36

## 2022-02-06 RX ADMIN — VANCOMYCIN HYDROCHLORIDE 2000 MG: 10 INJECTION, POWDER, LYOPHILIZED, FOR SOLUTION INTRAVENOUS at 11:44

## 2022-02-06 RX ADMIN — METRONIDAZOLE 500 MG: 500 INJECTION, SOLUTION INTRAVENOUS at 20:28

## 2022-02-06 RX ADMIN — POTASSIUM CHLORIDE 10 MEQ: 7.46 INJECTION, SOLUTION INTRAVENOUS at 15:08

## 2022-02-06 RX ADMIN — ENOXAPARIN SODIUM 40 MG: 100 INJECTION SUBCUTANEOUS at 14:50

## 2022-02-06 RX ADMIN — DIPHENHYDRAMINE HYDROCHLORIDE 50 MG: 50 CAPSULE ORAL at 12:55

## 2022-02-06 RX ADMIN — MORPHINE SULFATE 4 MG: 4 INJECTION, SOLUTION INTRAMUSCULAR; INTRAVENOUS at 07:46

## 2022-02-06 RX ADMIN — METRONIDAZOLE 500 MG: 500 INJECTION, SOLUTION INTRAVENOUS at 09:46

## 2022-02-06 RX ADMIN — SODIUM CHLORIDE, PRESERVATIVE FREE 10 ML: 5 INJECTION INTRAVENOUS at 16:16

## 2022-02-06 RX ADMIN — POTASSIUM PHOSPHATE, MONOBASIC POTASSIUM PHOSPHATE, DIBASIC: 224; 236 INJECTION, SOLUTION, CONCENTRATE INTRAVENOUS at 16:13

## 2022-02-06 RX ADMIN — LEVOFLOXACIN 750 MG: 5 INJECTION, SOLUTION INTRAVENOUS at 07:11

## 2022-02-06 RX ADMIN — SODIUM CHLORIDE 1000 ML: 9 INJECTION, SOLUTION INTRAVENOUS at 07:10

## 2022-02-06 RX ADMIN — SODIUM CHLORIDE, PRESERVATIVE FREE 10 ML: 5 INJECTION INTRAVENOUS at 14:40

## 2022-02-06 RX ADMIN — DEXTROSE MONOHYDRATE AND SODIUM CHLORIDE 125 ML/HR: 5; .9 INJECTION, SOLUTION INTRAVENOUS at 14:40

## 2022-02-06 RX ADMIN — FAMOTIDINE 20 MG: 20 TABLET, FILM COATED ORAL at 14:50

## 2022-02-06 RX ADMIN — INDOCYANINE GREEN AND WATER 3 MG: KIT at 14:59

## 2022-02-06 NOTE — ED NOTES
Fairly evaluated the patient at the bedside when she came back to bed 7. She is hypotensive with a blood pressure of 82/45. We have immediately tried to place an ultrasound-guided IV. I have also ordered IV fluids, IV vancomycin and IV Levaquin. The patient is allergic to penicillins. The morning ED physician will be the provider of record for this patient.

## 2022-02-06 NOTE — H&P
[facility]  General Surgery    Subjective:     CC: Acute calculus cholecystitis     HPI: Patient is a very pleasant 78-year-old female with past medical history remarkable for asthma, left breast cancer status post bilateral mastectomies with reconstruction, abscess of the right hand with felon of the right hand. She states that she was told last year on presentation to the emergency department with abdominal pain nausea vomiting that she had gallstones. She states that yesterday she had ribs to eat about 10 AM.  This morning she woke up with right upper quadrant abdominal pain. She denies any episodes of abdominal pain indigestion or bloating in the interval between the 2 attacks. She states that she does suffer from reflux with tomato sauce over the past 3 years. I reviewed the ultrasound independently on the monitor. Reveals pericholecystic fluid gallstones and thickened gallbladder wall. Common bile duct is 6 mm.     Patient Active Problem List    Diagnosis Date Noted    Breast cancer ([address] 08/26/2021    Felon of finger of right hand 07/05/2021    Cellulitis and abscess of hand 07/05/2021    Pregnancy 07/30/2016     Past Medical History:   Diagnosis Date    Asthma     last attack years ago    Cancer [facility])     BREAST    Chronic pain of left thumb       Past Surgical History:   Procedure Laterality Date    HAND/FINGER SURGERY UNLISTED      left thumb    HX BREAST BIOPSY      HX BREAST RECONSTRUCTION Bilateral 8/26/2021    BILATERAL BREAST RECONSTRUCTION WITH TISSUE EXPANDERS & ADIPOFASCIAL FLAPS performed by Mike Wells MD at [address]  [facility]  2016    HX MASTECTOMY Bilateral 8/26/2021    BILATERAL SKIN SPARING MASTECTOMY; LEFT SENTINEL NODE BIOPSY performed by Evangelista Sharma MD at [facility] MAIN OR      Family History   Problem Relation Age of Onset    No Known Problems Mother     No Known Problems Father       Social History     Tobacco Use    Smoking status: Never Smoker    Smokeless tobacco: Never Used   Substance Use Topics    Alcohol use: Yes     Comment: OCASSIONAL      Allergies   Allergen Reactions    Percocet [Oxycodone-Acetaminophen] Swelling     Pt can not remember if her mom told her  percocet or penicillin allergy so she does not take either. patient reports tylenol makes her eyes swell. States she is not allergic to Percocet. She has never taken it.  Tylenol [Acetaminophen] Anaphylaxis    Penicillins Other (comments)     Pt stated had allergy as a child as per her mother.  Gadavist [Gadobutrol] Nausea Only and Other (comments)     CO NAUSEA AND HEADACHE POST INJECTION. Prior to Admission medications    Medication Sig Start Date End Date Taking? Authorizing Provider   OXYBUTYNIN CHLORIDE PO Take 5 mg by mouth daily. Provider, Historical   albuterol (PROVENTIL HFA, VENTOLIN HFA, PROAIR HFA) 90 mcg/actuation inhaler Take 1 Puff by inhalation every four (4) hours as needed for Wheezing. 5/2/18   Kandi Neves MD       Review of Systems:    14 systems were reviewed. The results are as above in the HPI and otherwise negative. Objective:     Vitals:    02/06/22 0618 02/06/22 0717 02/06/22 0800 02/06/22 0902   BP: (!) 125/49 (!) 113/57 120/68 (!) 114/59   Pulse: 64 75 87 60   Resp: 19 20 25 18   Temp:  97.5 °F (36.4 °C)     SpO2: 100% 100% 100% 100%   Weight:       Height:           Physical Exam:  GENERAL: alert, cooperative, no distress, appears stated age,   EYE: conjunctivae/corneas clear. PERRL, EOM's intact. THROAT & NECK: normal and no erythema or exudates noted. ,    LYMPHATIC: Cervical, supraclavicular, and axillary nodes normal. ,   LUNG: clear to auscultation bilaterally,   HEART: regular rate and rhythm, S1, S2 normal, no murmur, click, rub or gallop,   ABDOMEN: soft, non-tender.  Bowel sounds normal. No masses,  no organomegaly,   EXTREMITIES:  extremities normal, atraumatic, no cyanosis or edema,   SKIN: Normal., NEUROLOGIC: AOx3. Cranial nerves 2-12 and sensation grossly intact. ,     Recent Results (from the past 24 hour(s))   CBC WITH AUTOMATED DIFF    Collection Time: 02/06/22  5:30 AM   Result Value Ref Range    WBC 1.9 (L) 4.6 - 13.2 K/uL    RBC 3.90 (L) 4.20 - 5.30 M/uL    HGB 10.2 (L) 12.0 - 16.0 g/dL    HCT 32.0 (L) 35.0 - 45.0 %    MCV 82.1 78.0 - 100.0 FL    MCH 26.2 24.0 - 34.0 PG    MCHC 31.9 31.0 - 37.0 g/dL    RDW 19.7 (H) 11.6 - 14.5 %    PLATELET 318 923 - 983 K/uL    MPV 8.7 (L) 9.2 - 11.8 FL    NRBC 0.0 0  WBC    ABSOLUTE NRBC 0.00 0.00 - 0.01 K/uL    NEUTROPHILS 4 (L) 40 - 73 %    LYMPHOCYTES 74 (H) 21 - 52 %    MONOCYTES 15 (H) 3 - 10 %    EOSINOPHILS 6 (H) 0 - 5 %    BASOPHILS 1 0 - 2 %    IMMATURE GRANULOCYTES 0 %    ABS. NEUTROPHILS 0.1 (L) 1.8 - 8.0 K/UL    ABS. LYMPHOCYTES 1.4 0.9 - 3.6 K/UL    ABS. MONOCYTES 0.3 0.05 - 1.2 K/UL    ABS. EOSINOPHILS 0.1 0.0 - 0.4 K/UL    ABS. BASOPHILS 0.0 0.0 - 0.1 K/UL    ABS. IMM. GRANS. 0.0 K/UL    DF MANUAL      PLATELET COMMENTS ADEQUATE PLATELETS      RBC COMMENTS POLYCHROMASIA  1+        RBC COMMENTS ANISOCYTOSIS  1+       METABOLIC PANEL, COMPREHENSIVE    Collection Time: 02/06/22  5:30 AM   Result Value Ref Range    Sodium 138 136 - 145 mmol/L    Potassium 3.0 (L) 3.5 - 5.5 mmol/L    Chloride 105 100 - 111 mmol/L    CO2 26 21 - 32 mmol/L    Anion gap 7 3.0 - 18 mmol/L    Glucose 197 (H) 74 - 99 mg/dL    BUN 10 7.0 - 18 MG/DL    Creatinine 1.07 0.6 - 1.3 MG/DL    BUN/Creatinine ratio 9 (L) 12 - 20      GFR est AA >60 >60 ml/min/1.73m2    GFR est non-AA >60 >60 ml/min/1.73m2    Calcium 8.9 8.5 - 10.1 MG/DL    Bilirubin, total 1.2 (H) 0.2 - 1.0 MG/DL    ALT (SGPT) 76 (H) 13 - 56 U/L    AST (SGOT) 84 (H) 10 - 38 U/L    Alk.  phosphatase 172 (H) 45 - 117 U/L    Protein, total 6.8 6.4 - 8.2 g/dL    Albumin 3.0 (L) 3.4 - 5.0 g/dL    Globulin 3.8 2.0 - 4.0 g/dL    A-G Ratio 0.8 0.8 - 1.7     LIPASE    Collection Time: 02/06/22  5:30 AM   Result Value Ref Range    Lipase 99 73 - 393 U/L   URINALYSIS W/ RFLX MICROSCOPIC    Collection Time: 02/06/22  5:41 AM   Result Value Ref Range    Color YELLOW      Appearance CLEAR      Specific gravity 1.013 1.005 - 1.030      pH (UA) 6.5 5.0 - 8.0      Protein 100 (A) NEG mg/dL    Glucose Negative NEG mg/dL    Ketone TRACE (A) NEG mg/dL    Bilirubin Negative NEG      Blood TRACE (A) NEG      Urobilinogen 2.0 (H) 0.2 - 1.0 EU/dL    Nitrites Negative NEG      Leukocyte Esterase Negative NEG     HCG URINE, QL    Collection Time: 02/06/22  5:41 AM   Result Value Ref Range    HCG urine, QL Negative NEG     URINE MICROSCOPIC ONLY    Collection Time: 02/06/22  5:41 AM   Result Value Ref Range    WBC 5 to 8 0 - 4 /hpf    RBC 0 to 2 0 - 5 /hpf    Epithelial cells 1+ 0 - 5 /lpf    Bacteria 2+ (A) NEG /hpf    Yeast FEW (A) NEG     TYPE & SCREEN    Collection Time: 02/06/22  6:10 AM   Result Value Ref Range    Crossmatch Expiration 02/09/2022,2359     ABO/Rh(D) [employer] POSITIVE     Antibody screen NEG    POC LACTIC ACID    Collection Time: 02/06/22  6:35 AM   Result Value Ref Range    Lactic Acid (POC) 3.13 (HH) 0.40 - 2.00 mmol/L       Impression:     · Patient with acute calculus cholecystitis.     Plan:     · IV hydration  · IV antibiotics  · Clear liquid diet  · A.m. lab work    AtSigned By: Maile Mondragon MD     February 6, 2022

## 2022-02-06 NOTE — PROGRESS NOTES
Kinetic Dosing- Initial Progress Note    Pharmacy Consult ordered by Dr. Sidra Robles MD     Indication: Sepsis of Unknown Etiology    Patient clinical status and labs ordered/reviewed. Pt Weight Weight: 101.6 kg (224 lb)   Serum Creatinine Lab Results   Component Value Date/Time    Creatinine 0.78 07/06/2021 04:23 AM       Creatinine Clearance CrCl cannot be calculated (Patient's most recent lab result is older than the maximum 180 days allowed. ). BUN Lab Results   Component Value Date/Time    BUN 13 07/06/2021 04:23 AM       WBC Lab Results   Component Value Date/Time    WBC 2.8 (L) 07/06/2021 04:23 AM      Temperature Temp: 97.5 °F (36.4 °C)   HR Pulse (Heart Rate): 85     BP BP: (!) 100/56           Kinetic Dosing Parameters: Labs Pending       Drug Levels:   Vancomycin    No results for input(s): VANCP, VANCT, VANCR, VANRA in the last 72 hours. Dose for naïve patient Vancomycin was initiated at: 2000 mg IVPB x 1  Maint.  Dose will be determined once current labs report    Continue to monitor    Sign: Derek [email or web address]  Date: 2/6/2022  Time: 5:52 AM

## 2022-02-06 NOTE — PROGRESS NOTES
Metronidazole 500mg IV q12h was therapeutically interchanged for metronidazole 500mg q8h per the P&T Committee approved Therapeutic Interchanges Policy.     Jose Miles, [facility], Pharmacist  2/6/2022 1:22 PM

## 2022-02-06 NOTE — Clinical Note
Status[de-identified] INPATIENT [101]   Type of Bed: Surgical [18]   Cardiac Monitoring Required?: No   Inpatient Hospitalization Certified Necessary for the Following Reasons: 3.  Patient receiving treatment that can only be provided in an inpatient setting (further clarification in H&P documentation)   Admitting Diagnosis: Cholecystitis [604687]   Admitting Physician: Jagruti Saldivar   Attending Physician: Jagruti Saldivar   Estimated Length of Stay: 2 Midnights   Discharge Plan[de-identified] Home with Office Follow-up

## 2022-02-06 NOTE — ED PROVIDER NOTES
EMERGENCY DEPARTMENT HISTORY AND PHYSICAL EXAM    I have evaluated the patient at 6:27 AM      Date: 2/6/2022  Patient Name: Ella Ragland    History of Presenting Illness     Chief Complaint   Patient presents with    Abdominal Pain    Headache         History Provided By: Patient  Location/Duration/Severity/Modifying factors   This is a 65-year-old female with history of breast cancer currently undergoing chemo therapy and asthma presenting to the emergency department with upper abdominal pain. Patient states it woke her out of sleep around 340 this morning. She describes a gnawing epigastric pain with associated hematemesis this morning. She reports occasional pain similar to this in the past but not this intense. Denies any fevers or chills or diarrhea. She denies any abdominal surgeries          PCP: Bry Ordoñez MD    Current Facility-Administered Medications   Medication Dose Route Frequency Provider Last Rate Last Admin    Vancomycin - Pharmacy to Dose   Other Rx Dosing/Monitoring Nidia Treviño MD        vancomycin (VANCOCIN) 2000 mg in  ml infusion  2,000 mg IntraVENous ONCE Nidia Treviño MD        potassium chloride 10 mEq in 100 ml IVPB  10 mEq IntraVENous NOW Lutricia Deonna, DO         Current Outpatient Medications   Medication Sig Dispense Refill    OXYBUTYNIN CHLORIDE PO Take 5 mg by mouth daily.  albuterol (PROVENTIL HFA, VENTOLIN HFA, PROAIR HFA) 90 mcg/actuation inhaler Take 1 Puff by inhalation every four (4) hours as needed for Wheezing.  1 Inhaler 0       Past History     Past Medical History:  Past Medical History:   Diagnosis Date    Asthma     last attack years ago    Cancer [facility])     BREAST    Chronic pain of left thumb        Past Surgical History:  Past Surgical History:   Procedure Laterality Date    HAND/FINGER SURGERY UNLISTED      left thumb    HX BREAST BIOPSY      HX BREAST RECONSTRUCTION Bilateral 8/26/2021    BILATERAL BREAST RECONSTRUCTION WITH TISSUE EXPANDERS & ADIPOFASCIAL FLAPS performed by Danyell Almodovar MD at [facility] OR     [address]  2016    HX MASTECTOMY Bilateral 8/26/2021    BILATERAL SKIN SPARING MASTECTOMY; LEFT SENTINEL NODE BIOPSY performed by Benito García MD at [facility] OR       Family History:  Family History   Problem Relation Age of Onset    No Known Problems Mother     No Known Problems Father        Social History:  Social History     Tobacco Use    Smoking status: Never Smoker    Smokeless tobacco: Never Used   Substance Use Topics    Alcohol use: Yes     Comment: OCASSIONAL    Drug use: Yes       Allergies: Allergies   Allergen Reactions    Percocet [Oxycodone-Acetaminophen] Swelling     Pt can not remember if her mom told her  percocet or penicillin allergy so she does not take either. patient reports tylenol makes her eyes swell. States she is not allergic to Percocet. She has never taken it.  Tylenol [Acetaminophen] Anaphylaxis    Penicillins Other (comments)     Pt stated had allergy as a child as per her mother.  Gadavist [Gadobutrol] Nausea Only and Other (comments)     CO NAUSEA AND HEADACHE POST INJECTION. Review of Systems       Review of Systems   Constitutional: Negative for activity change, chills, diaphoresis, fatigue and fever. Respiratory: Negative for cough, chest tightness, shortness of breath, wheezing and stridor. Cardiovascular: Negative for chest pain and palpitations. Gastrointestinal: Positive for abdominal pain, nausea and vomiting. Negative for abdominal distention, constipation and diarrhea. Genitourinary: Negative for difficulty urinating, dysuria and hematuria. Musculoskeletal: Negative for back pain, joint swelling and myalgias. Skin: Negative for rash and wound. Neurological: Positive for light-headedness. Negative for dizziness, weakness, numbness and headaches. Psychiatric/Behavioral: Negative for agitation.  The patient is not nervous/anxious. Physical Exam     Visit Vitals  BP (!) 114/59 (BP 1 Location: Left upper arm, BP Patient Position: At rest)   Pulse 60   Temp 97.5 °F (36.4 °C)   Resp 18   Ht 5' (1.524 m)   Wt 101.6 kg (224 lb)   SpO2 100%   BMI 43.75 kg/m²         Physical Exam  Constitutional:       General: She is not in acute distress. Appearance: She is not toxic-appearing. HENT:      Head: Normocephalic and atraumatic. Mouth/Throat:      Mouth: Mucous membranes are moist.   Eyes:      Extraocular Movements: Extraocular movements intact. Pupils: Pupils are equal, round, and reactive to light. Cardiovascular:      Rate and Rhythm: Normal rate and regular rhythm. Heart sounds: Normal heart sounds. No murmur heard. No friction rub. No gallop. Pulmonary:      Effort: Pulmonary effort is normal.      Breath sounds: Normal breath sounds. Abdominal:      General: There is no distension. Palpations: Abdomen is soft. There is no mass. Tenderness: There is abdominal tenderness in the right upper quadrant and epigastric area. There is no guarding. Hernia: No hernia is present. Musculoskeletal:         General: No swelling, tenderness or deformity. Cervical back: Normal range of motion and neck supple. Skin:     General: Skin is warm and dry. Findings: No rash. Neurological:      General: No focal deficit present. Mental Status: She is alert and oriented to person, place, and time.    Psychiatric:         Mood and Affect: Mood normal.           Diagnostic Study Results     Labs -  Recent Results (from the past 12 hour(s))   CBC WITH AUTOMATED DIFF    Collection Time: 02/06/22  5:30 AM   Result Value Ref Range    WBC 1.9 (L) 4.6 - 13.2 K/uL    RBC 3.90 (L) 4.20 - 5.30 M/uL    HGB 10.2 (L) 12.0 - 16.0 g/dL    HCT 32.0 (L) 35.0 - 45.0 %    MCV 82.1 78.0 - 100.0 FL    MCH 26.2 24.0 - 34.0 PG    MCHC 31.9 31.0 - 37.0 g/dL    RDW 19.7 (H) 11.6 - 14.5 %    PLATELET 435 967 - 420 K/uL    MPV 8.7 (L) 9.2 - 11.8 FL    NRBC 0.0 0  WBC    ABSOLUTE NRBC 0.00 0.00 - 0.01 K/uL    NEUTROPHILS 4 (L) 40 - 73 %    LYMPHOCYTES 74 (H) 21 - 52 %    MONOCYTES 15 (H) 3 - 10 %    EOSINOPHILS 6 (H) 0 - 5 %    BASOPHILS 1 0 - 2 %    IMMATURE GRANULOCYTES 0 %    ABS. NEUTROPHILS 0.1 (L) 1.8 - 8.0 K/UL    ABS. LYMPHOCYTES 1.4 0.9 - 3.6 K/UL    ABS. MONOCYTES 0.3 0.05 - 1.2 K/UL    ABS. EOSINOPHILS 0.1 0.0 - 0.4 K/UL    ABS. BASOPHILS 0.0 0.0 - 0.1 K/UL    ABS. IMM. GRANS. 0.0 K/UL    DF MANUAL      PLATELET COMMENTS ADEQUATE PLATELETS      RBC COMMENTS POLYCHROMASIA  1+        RBC COMMENTS ANISOCYTOSIS  1+       METABOLIC PANEL, COMPREHENSIVE    Collection Time: 02/06/22  5:30 AM   Result Value Ref Range    Sodium 138 136 - 145 mmol/L    Potassium 3.0 (L) 3.5 - 5.5 mmol/L    Chloride 105 100 - 111 mmol/L    CO2 26 21 - 32 mmol/L    Anion gap 7 3.0 - 18 mmol/L    Glucose 197 (H) 74 - 99 mg/dL    BUN 10 7.0 - 18 MG/DL    Creatinine 1.07 0.6 - 1.3 MG/DL    BUN/Creatinine ratio 9 (L) 12 - 20      GFR est AA >60 >60 ml/min/1.73m2    GFR est non-AA >60 >60 ml/min/1.73m2    Calcium 8.9 8.5 - 10.1 MG/DL    Bilirubin, total 1.2 (H) 0.2 - 1.0 MG/DL    ALT (SGPT) 76 (H) 13 - 56 U/L    AST (SGOT) 84 (H) 10 - 38 U/L    Alk.  phosphatase 172 (H) 45 - 117 U/L    Protein, total 6.8 6.4 - 8.2 g/dL    Albumin 3.0 (L) 3.4 - 5.0 g/dL    Globulin 3.8 2.0 - 4.0 g/dL    A-G Ratio 0.8 0.8 - 1.7     LIPASE    Collection Time: 02/06/22  5:30 AM   Result Value Ref Range    Lipase 99 73 - 393 U/L   URINALYSIS W/ RFLX MICROSCOPIC    Collection Time: 02/06/22  5:41 AM   Result Value Ref Range    Color YELLOW      Appearance CLEAR      Specific gravity 1.013 1.005 - 1.030      pH (UA) 6.5 5.0 - 8.0      Protein 100 (A) NEG mg/dL    Glucose Negative NEG mg/dL    Ketone TRACE (A) NEG mg/dL    Bilirubin Negative NEG      Blood TRACE (A) NEG      Urobilinogen 2.0 (H) 0.2 - 1.0 EU/dL    Nitrites Negative NEG      Leukocyte Esterase Negative NEG     HCG URINE, QL    Collection Time: 02/06/22  5:41 AM   Result Value Ref Range    HCG urine, QL Negative NEG     URINE MICROSCOPIC ONLY    Collection Time: 02/06/22  5:41 AM   Result Value Ref Range    WBC 5 to 8 0 - 4 /hpf    RBC 0 to 2 0 - 5 /hpf    Epithelial cells 1+ 0 - 5 /lpf    Bacteria 2+ (A) NEG /hpf    Yeast FEW (A) NEG     TYPE & SCREEN    Collection Time: 02/06/22  6:10 AM   Result Value Ref Range    Crossmatch Expiration 02/09/2022,2359     ABO/Rh(D) [employer] POSITIVE     Antibody screen NEG    POC LACTIC ACID    Collection Time: 02/06/22  6:35 AM   Result Value Ref Range    Lactic Acid (POC) 3.13 (HH) 0.40 - 2.00 mmol/L       Radiologic Studies -   US ABD LTD   Final Result      1. Gallbladder is moderately dilated. Multiple small gallstones in the   gallbladder. Pericholecystic fluid. Correlate clinically for acute   cholecystitis. Technologist reported a negative sonographic [place] sign-has the   patient received pain medication? 2.  Mild hepatomegaly. Hepatic steatosis. CT ABD PELV W CONT   Final Result   1. Distended gallbladder. The possibility of acute cholecystitis should be   considered. Recommend correlation with ultrasound and/or nuclear medicine   hepatobiliary scan. 2.  Bilateral hip dysplasia with secondary osteoarthritis most pronounced on the   right. Medical Decision Making   I am the first provider for this patient. I reviewed the vital signs, available nursing notes, past medical history, past surgical history, family history and social history. Vital Signs-Reviewed the patient's vital signs.       Records Reviewed: Nursing Notes, Old Medical Records, Previous electrocardiograms, Previous Radiology Studies and Previous Laboratory Studies (Time of Review: 6:27 AM)    ED Course: Progress Notes, Reevaluation, and Consults:         Provider Notes (Medical Decision Making):   MDM  Number of Diagnoses or Management Options  Diagnosis management comments: 25-year-old female presenting to the emergency department for evaluation of abdominal pain. She was reportedly initially hypotensive upon ER arrival with systolics in the 18S and 43K. However was noted that her cuff size was too large and this was corrected. Her blood pressure is normotensive with systolics in the 020I. Her physical exam is notable for epigastric and right upper quadrant tenderness but without guarding or rebound. Screening lab work initiated. This demonstrates mildly elevated bilirubin, alkaline phosphatase, and LFTs.  will obtain CT of the abdomen pelvis, this is negative, will follow up with right upper quadrant ultrasound. Additionally there is hypokalemia of 3.0 appreciated. This will be repleted via her IV.      9730:  Patient CT abdomen shows a distended gallbladder concerning for cholecystitis. Following up with right upper quadrant ultrasound. Antibiotics initiated. 5599:  Right upper quadrant ultrasound shows a moderately distended gallbladder with multiple gallstones and for cholecystic fluid. 1003:  Discussed with general surgery. Dr. Marissa Edwards will come to evaluate the patient at bedside. 1045:  Dr. Marissa Edwards has assessed patient. Plans for surgical intervention. Diagnosis     Clinical Impression:   1. Cholecystitis        Disposition: admitted    Follow-up Information    None          Patient's Medications   Start Taking    No medications on file   Continue Taking    ALBUTEROL (PROVENTIL HFA, VENTOLIN HFA, PROAIR HFA) 90 MCG/ACTUATION INHALER    Take 1 Puff by inhalation every four (4) hours as needed for Wheezing. OXYBUTYNIN CHLORIDE PO    Take 5 mg by mouth daily. These Medications have changed    No medications on file   Stop Taking    No medications on file     Disclaimer: Sections of this note are dictated using utilizing voice recognition software. Minor typographical errors may be present.  If questions arise, please do not hesitate to contact me or call our department.

## 2022-02-07 ENCOUNTER — ANESTHESIA (OUTPATIENT)
Dept: SURGERY | Age: 27
DRG: 263 | End: 2022-02-07
Payer: MEDICAID

## 2022-02-07 LAB
ALBUMIN SERPL-MCNC: 2.1 G/DL (ref 3.4–5)
ALBUMIN/GLOB SERPL: 0.7 {RATIO} (ref 0.8–1.7)
ALP SERPL-CCNC: 147 U/L (ref 45–117)
ALT SERPL-CCNC: 224 U/L (ref 13–56)
ANION GAP SERPL CALC-SCNC: 7 MMOL/L (ref 3–18)
AST SERPL-CCNC: 295 U/L (ref 10–38)
BASOPHILS # BLD: 0 K/UL (ref 0–0.1)
BASOPHILS NFR BLD: 1 % (ref 0–2)
BILIRUB DIRECT SERPL-MCNC: 1 MG/DL (ref 0–0.2)
BILIRUB SERPL-MCNC: 2.2 MG/DL (ref 0.2–1)
BUN SERPL-MCNC: 3 MG/DL (ref 7–18)
BUN/CREAT SERPL: 3 (ref 12–20)
CALCIUM SERPL-MCNC: 8 MG/DL (ref 8.5–10.1)
CHLORIDE SERPL-SCNC: 112 MMOL/L (ref 100–111)
CO2 SERPL-SCNC: 25 MMOL/L (ref 21–32)
CREAT SERPL-MCNC: 0.91 MG/DL (ref 0.6–1.3)
DIFFERENTIAL METHOD BLD: ABNORMAL
EOSINOPHIL # BLD: 0.1 K/UL (ref 0–0.4)
EOSINOPHIL NFR BLD: 4 % (ref 0–5)
ERYTHROCYTE [DISTWIDTH] IN BLOOD BY AUTOMATED COUNT: 20.4 % (ref 11.6–14.5)
GLOBULIN SER CALC-MCNC: 3 G/DL (ref 2–4)
GLUCOSE SERPL-MCNC: 172 MG/DL (ref 74–99)
HCT VFR BLD AUTO: 26.8 % (ref 35–45)
HGB BLD-MCNC: 8.5 G/DL (ref 12–16)
IMM GRANULOCYTES # BLD AUTO: 0 K/UL
IMM GRANULOCYTES NFR BLD AUTO: 0 %
LIPASE SERPL-CCNC: 53 U/L (ref 73–393)
LYMPHOCYTES # BLD: 1.4 K/UL (ref 0.9–3.6)
LYMPHOCYTES NFR BLD: 73 % (ref 21–52)
MCH RBC QN AUTO: 26.3 PG (ref 24–34)
MCHC RBC AUTO-ENTMCNC: 31.7 G/DL (ref 31–37)
MCV RBC AUTO: 83 FL (ref 78–100)
MONOCYTES # BLD: 0.2 K/UL (ref 0.05–1.2)
MONOCYTES NFR BLD: 12 % (ref 3–10)
NEUTS SEG # BLD: 0.2 K/UL (ref 1.8–8)
NEUTS SEG NFR BLD: 10 % (ref 40–73)
NRBC # BLD: 0.03 K/UL (ref 0–0.01)
NRBC BLD-RTO: 1.6 PER 100 WBC
PHOSPHATE SERPL-MCNC: 3.3 MG/DL (ref 2.5–4.9)
PLATELET # BLD AUTO: 360 K/UL (ref 135–420)
PLATELET COMMENTS,PCOM: ABNORMAL
PMV BLD AUTO: 9.2 FL (ref 9.2–11.8)
POTASSIUM SERPL-SCNC: 2.7 MMOL/L (ref 3.5–5.5)
PROT SERPL-MCNC: 5.1 G/DL (ref 6.4–8.2)
RBC # BLD AUTO: 3.23 M/UL (ref 4.2–5.3)
RBC MORPH BLD: ABNORMAL
RBC MORPH BLD: ABNORMAL
SODIUM SERPL-SCNC: 144 MMOL/L (ref 136–145)
WBC # BLD AUTO: 1.9 K/UL (ref 4.6–13.2)

## 2022-02-07 PROCEDURE — 74011250637 HC RX REV CODE- 250/637: Performed by: STUDENT IN AN ORGANIZED HEALTH CARE EDUCATION/TRAINING PROGRAM

## 2022-02-07 PROCEDURE — 0FT44ZZ RESECTION OF GALLBLADDER, PERCUTANEOUS ENDOSCOPIC APPROACH: ICD-10-PCS | Performed by: SURGERY

## 2022-02-07 PROCEDURE — 74011250636 HC RX REV CODE- 250/636: Performed by: NURSE ANESTHETIST, CERTIFIED REGISTERED

## 2022-02-07 PROCEDURE — 47562 LAPAROSCOPIC CHOLECYSTECTOMY: CPT | Performed by: SURGERY

## 2022-02-07 PROCEDURE — 77030039895 HC SYST SMK EVAC LAP COVD -B: Performed by: SURGERY

## 2022-02-07 PROCEDURE — 74011250637 HC RX REV CODE- 250/637: Performed by: SURGERY

## 2022-02-07 PROCEDURE — 85025 COMPLETE CBC W/AUTO DIFF WBC: CPT

## 2022-02-07 PROCEDURE — 83690 ASSAY OF LIPASE: CPT

## 2022-02-07 PROCEDURE — 80076 HEPATIC FUNCTION PANEL: CPT

## 2022-02-07 PROCEDURE — 36415 COLL VENOUS BLD VENIPUNCTURE: CPT

## 2022-02-07 PROCEDURE — 77030020703 HC SEAL CANN DISP INTU -B: Performed by: SURGERY

## 2022-02-07 PROCEDURE — 65270000029 HC RM PRIVATE

## 2022-02-07 PROCEDURE — 76060000033 HC ANESTHESIA 1 TO 1.5 HR: Performed by: SURGERY

## 2022-02-07 PROCEDURE — 2709999900 HC NON-CHARGEABLE SUPPLY

## 2022-02-07 PROCEDURE — 2709999900 HC NON-CHARGEABLE SUPPLY: Performed by: SURGERY

## 2022-02-07 PROCEDURE — 77030002933 HC SUT MCRYL J&J -A: Performed by: SURGERY

## 2022-02-07 PROCEDURE — 83735 ASSAY OF MAGNESIUM: CPT

## 2022-02-07 PROCEDURE — 88304 TISSUE EXAM BY PATHOLOGIST: CPT

## 2022-02-07 PROCEDURE — 74011250636 HC RX REV CODE- 250/636: Performed by: SURGERY

## 2022-02-07 PROCEDURE — 77030028402 HC SYS LAPSC TISS RETRV AMR -B: Performed by: SURGERY

## 2022-02-07 PROCEDURE — 76210000000 HC OR PH I REC 2 TO 2.5 HR: Performed by: SURGERY

## 2022-02-07 PROCEDURE — 74011000250 HC RX REV CODE- 250: Performed by: NURSE ANESTHETIST, CERTIFIED REGISTERED

## 2022-02-07 PROCEDURE — 74011000258 HC RX REV CODE- 258: Performed by: NURSE ANESTHETIST, CERTIFIED REGISTERED

## 2022-02-07 PROCEDURE — 84100 ASSAY OF PHOSPHORUS: CPT

## 2022-02-07 PROCEDURE — 74011250636 HC RX REV CODE- 250/636: Performed by: STUDENT IN AN ORGANIZED HEALTH CARE EDUCATION/TRAINING PROGRAM

## 2022-02-07 PROCEDURE — 77030033188 HC TBNG FLTRD BIIFUR DISP CNMD -C: Performed by: SURGERY

## 2022-02-07 PROCEDURE — 77030040922 HC BLNKT HYPOTHRM STRY -A: Performed by: SURGERY

## 2022-02-07 PROCEDURE — 74011250637 HC RX REV CODE- 250/637: Performed by: HOSPITALIST

## 2022-02-07 PROCEDURE — S2900 ROBOTIC SURGICAL SYSTEM: HCPCS | Performed by: SURGERY

## 2022-02-07 PROCEDURE — 77030018836 HC SOL IRR NACL ICUM -A: Performed by: SURGERY

## 2022-02-07 PROCEDURE — 76010000934 HC OR TIME 1 TO 1.5HR INTENSV - TIER 2: Performed by: SURGERY

## 2022-02-07 PROCEDURE — 77030012770 HC TRCR OPT FX AMR -B: Performed by: SURGERY

## 2022-02-07 PROCEDURE — 77030040361 HC SLV COMPR DVT MDII -B: Performed by: SURGERY

## 2022-02-07 PROCEDURE — 77030003028 HC SUT VCRL J&J -A: Performed by: SURGERY

## 2022-02-07 PROCEDURE — 74011000258 HC RX REV CODE- 258: Performed by: SURGERY

## 2022-02-07 PROCEDURE — 8E0W4CZ ROBOTIC ASSISTED PROCEDURE OF TRUNK REGION, PERCUTANEOUS ENDOSCOPIC APPROACH: ICD-10-PCS | Performed by: SURGERY

## 2022-02-07 PROCEDURE — 77030010939 HC CLP LIG TELE -B: Performed by: SURGERY

## 2022-02-07 PROCEDURE — 74011000250 HC RX REV CODE- 250: Performed by: SURGERY

## 2022-02-07 PROCEDURE — 80048 BASIC METABOLIC PNL TOTAL CA: CPT

## 2022-02-07 RX ORDER — FENTANYL CITRATE 50 UG/ML
25 INJECTION, SOLUTION INTRAMUSCULAR; INTRAVENOUS
Status: DISCONTINUED | OUTPATIENT
Start: 2022-02-07 | End: 2022-02-07 | Stop reason: HOSPADM

## 2022-02-07 RX ORDER — SODIUM CHLORIDE, SODIUM LACTATE, POTASSIUM CHLORIDE, CALCIUM CHLORIDE 600; 310; 30; 20 MG/100ML; MG/100ML; MG/100ML; MG/100ML
100 INJECTION, SOLUTION INTRAVENOUS CONTINUOUS
Status: DISCONTINUED | OUTPATIENT
Start: 2022-02-07 | End: 2022-02-07 | Stop reason: HOSPADM

## 2022-02-07 RX ORDER — HYDROMORPHONE HYDROCHLORIDE 2 MG/1
2 TABLET ORAL
Status: DISCONTINUED | OUTPATIENT
Start: 2022-02-07 | End: 2022-02-08

## 2022-02-07 RX ORDER — PHENYLEPHRINE HCL IN 0.9% NACL 1 MG/10 ML
SYRINGE (ML) INTRAVENOUS AS NEEDED
Status: DISCONTINUED | OUTPATIENT
Start: 2022-02-07 | End: 2022-02-07 | Stop reason: HOSPADM

## 2022-02-07 RX ORDER — MIDAZOLAM HYDROCHLORIDE 1 MG/ML
INJECTION, SOLUTION INTRAMUSCULAR; INTRAVENOUS AS NEEDED
Status: DISCONTINUED | OUTPATIENT
Start: 2022-02-07 | End: 2022-02-07 | Stop reason: HOSPADM

## 2022-02-07 RX ORDER — SODIUM CHLORIDE, SODIUM LACTATE, POTASSIUM CHLORIDE, CALCIUM CHLORIDE 600; 310; 30; 20 MG/100ML; MG/100ML; MG/100ML; MG/100ML
25 INJECTION, SOLUTION INTRAVENOUS CONTINUOUS
Status: DISCONTINUED | OUTPATIENT
Start: 2022-02-07 | End: 2022-02-09

## 2022-02-07 RX ORDER — DIPHENHYDRAMINE HYDROCHLORIDE 50 MG/ML
12.5 INJECTION, SOLUTION INTRAMUSCULAR; INTRAVENOUS
Status: DISCONTINUED | OUTPATIENT
Start: 2022-02-07 | End: 2022-02-07 | Stop reason: HOSPADM

## 2022-02-07 RX ORDER — ONDANSETRON 2 MG/ML
INJECTION INTRAMUSCULAR; INTRAVENOUS AS NEEDED
Status: DISCONTINUED | OUTPATIENT
Start: 2022-02-07 | End: 2022-02-07 | Stop reason: HOSPADM

## 2022-02-07 RX ORDER — KETOROLAC TROMETHAMINE 15 MG/ML
INJECTION, SOLUTION INTRAMUSCULAR; INTRAVENOUS AS NEEDED
Status: DISCONTINUED | OUTPATIENT
Start: 2022-02-07 | End: 2022-02-07 | Stop reason: HOSPADM

## 2022-02-07 RX ORDER — ROCURONIUM BROMIDE 10 MG/ML
INJECTION, SOLUTION INTRAVENOUS AS NEEDED
Status: DISCONTINUED | OUTPATIENT
Start: 2022-02-07 | End: 2022-02-07 | Stop reason: HOSPADM

## 2022-02-07 RX ORDER — HYDROMORPHONE HYDROCHLORIDE 2 MG/ML
0.5 INJECTION, SOLUTION INTRAMUSCULAR; INTRAVENOUS; SUBCUTANEOUS
Status: DISCONTINUED | OUTPATIENT
Start: 2022-02-07 | End: 2022-02-07 | Stop reason: HOSPADM

## 2022-02-07 RX ORDER — FENTANYL CITRATE 50 UG/ML
INJECTION, SOLUTION INTRAMUSCULAR; INTRAVENOUS AS NEEDED
Status: DISCONTINUED | OUTPATIENT
Start: 2022-02-07 | End: 2022-02-07 | Stop reason: HOSPADM

## 2022-02-07 RX ORDER — FAMOTIDINE 40 MG/5ML
20 POWDER, FOR SUSPENSION ORAL ONCE
Status: DISCONTINUED | OUTPATIENT
Start: 2022-02-07 | End: 2022-02-07

## 2022-02-07 RX ORDER — ONDANSETRON 2 MG/ML
4 INJECTION INTRAMUSCULAR; INTRAVENOUS ONCE
Status: COMPLETED | OUTPATIENT
Start: 2022-02-07 | End: 2022-02-07

## 2022-02-07 RX ORDER — FAMOTIDINE 20 MG/1
20 TABLET, FILM COATED ORAL ONCE
Status: COMPLETED | OUTPATIENT
Start: 2022-02-07 | End: 2022-02-07

## 2022-02-07 RX ORDER — ESMOLOL HYDROCHLORIDE 10 MG/ML
INJECTION INTRAVENOUS AS NEEDED
Status: DISCONTINUED | OUTPATIENT
Start: 2022-02-07 | End: 2022-02-07 | Stop reason: HOSPADM

## 2022-02-07 RX ORDER — DEXAMETHASONE SODIUM PHOSPHATE 4 MG/ML
INJECTION, SOLUTION INTRA-ARTICULAR; INTRALESIONAL; INTRAMUSCULAR; INTRAVENOUS; SOFT TISSUE AS NEEDED
Status: DISCONTINUED | OUTPATIENT
Start: 2022-02-07 | End: 2022-02-07 | Stop reason: HOSPADM

## 2022-02-07 RX ORDER — BUPIVACAINE HYDROCHLORIDE 5 MG/ML
INJECTION, SOLUTION EPIDURAL; INTRACAUDAL AS NEEDED
Status: DISCONTINUED | OUTPATIENT
Start: 2022-02-07 | End: 2022-02-07 | Stop reason: HOSPADM

## 2022-02-07 RX ORDER — LIDOCAINE HYDROCHLORIDE 20 MG/ML
INJECTION, SOLUTION EPIDURAL; INFILTRATION; INTRACAUDAL; PERINEURAL AS NEEDED
Status: DISCONTINUED | OUTPATIENT
Start: 2022-02-07 | End: 2022-02-07 | Stop reason: HOSPADM

## 2022-02-07 RX ORDER — PROPOFOL 10 MG/ML
INJECTION, EMULSION INTRAVENOUS AS NEEDED
Status: DISCONTINUED | OUTPATIENT
Start: 2022-02-07 | End: 2022-02-07 | Stop reason: HOSPADM

## 2022-02-07 RX ORDER — POTASSIUM CHLORIDE 20 MEQ/1
40 TABLET, EXTENDED RELEASE ORAL EVERY 4 HOURS
Status: DISPENSED | OUTPATIENT
Start: 2022-02-07 | End: 2022-02-07

## 2022-02-07 RX ORDER — GLYCOPYRROLATE 0.2 MG/ML
INJECTION INTRAMUSCULAR; INTRAVENOUS AS NEEDED
Status: DISCONTINUED | OUTPATIENT
Start: 2022-02-07 | End: 2022-02-07 | Stop reason: HOSPADM

## 2022-02-07 RX ORDER — NEOSTIGMINE METHYLSULFATE 1 MG/ML
INJECTION, SOLUTION INTRAVENOUS AS NEEDED
Status: DISCONTINUED | OUTPATIENT
Start: 2022-02-07 | End: 2022-02-07 | Stop reason: HOSPADM

## 2022-02-07 RX ORDER — ALBUTEROL SULFATE 0.83 MG/ML
2.5 SOLUTION RESPIRATORY (INHALATION) AS NEEDED
Status: DISCONTINUED | OUTPATIENT
Start: 2022-02-07 | End: 2022-02-07 | Stop reason: HOSPADM

## 2022-02-07 RX ORDER — SUCCINYLCHOLINE CHLORIDE 20 MG/ML
INJECTION INTRAMUSCULAR; INTRAVENOUS AS NEEDED
Status: DISCONTINUED | OUTPATIENT
Start: 2022-02-07 | End: 2022-02-07 | Stop reason: HOSPADM

## 2022-02-07 RX ADMIN — KETOROLAC TROMETHAMINE 15 MG: 15 INJECTION, SOLUTION INTRAMUSCULAR; INTRAVENOUS at 13:54

## 2022-02-07 RX ADMIN — PROPOFOL 150 MG: 10 INJECTION, EMULSION INTRAVENOUS at 12:58

## 2022-02-07 RX ADMIN — HYDROMORPHONE HYDROCHLORIDE 0.5 MG: 2 INJECTION, SOLUTION INTRAMUSCULAR; INTRAVENOUS; SUBCUTANEOUS at 14:55

## 2022-02-07 RX ADMIN — ONDANSETRON 4 MG: 2 INJECTION INTRAMUSCULAR; INTRAVENOUS at 14:55

## 2022-02-07 RX ADMIN — Medication 3 MG: at 13:53

## 2022-02-07 RX ADMIN — MIDAZOLAM HYDROCHLORIDE 2 MG: 2 INJECTION, SOLUTION INTRAMUSCULAR; INTRAVENOUS at 12:48

## 2022-02-07 RX ADMIN — DEXMEDETOMIDINE HYDROCHLORIDE 4 MCG: 100 INJECTION, SOLUTION, CONCENTRATE INTRAVENOUS at 14:07

## 2022-02-07 RX ADMIN — FENTANYL CITRATE 100 MCG: 50 INJECTION, SOLUTION INTRAMUSCULAR; INTRAVENOUS at 12:58

## 2022-02-07 RX ADMIN — GLYCOPYRROLATE 0.4 MG: 0.2 INJECTION INTRAMUSCULAR; INTRAVENOUS at 13:53

## 2022-02-07 RX ADMIN — FAMOTIDINE 20 MG: 20 TABLET ORAL at 12:03

## 2022-02-07 RX ADMIN — GLYCOPYRROLATE 0.2 MG: 0.2 INJECTION INTRAMUSCULAR; INTRAVENOUS at 12:48

## 2022-02-07 RX ADMIN — METRONIDAZOLE 500 MG: 500 INJECTION, SOLUTION INTRAVENOUS at 20:31

## 2022-02-07 RX ADMIN — ONDANSETRON 4 MG: 2 INJECTION INTRAMUSCULAR; INTRAVENOUS at 13:14

## 2022-02-07 RX ADMIN — DEXTROSE MONOHYDRATE AND SODIUM CHLORIDE 125 ML/HR: 5; .9 INJECTION, SOLUTION INTRAVENOUS at 08:35

## 2022-02-07 RX ADMIN — SODIUM CHLORIDE, PRESERVATIVE FREE 10 ML: 5 INJECTION INTRAVENOUS at 20:56

## 2022-02-07 RX ADMIN — DEXMEDETOMIDINE HYDROCHLORIDE 6 MCG: 100 INJECTION, SOLUTION, CONCENTRATE INTRAVENOUS at 13:15

## 2022-02-07 RX ADMIN — SODIUM CHLORIDE, PRESERVATIVE FREE 10 ML: 5 INJECTION INTRAVENOUS at 20:38

## 2022-02-07 RX ADMIN — SUCCINYLCHOLINE CHLORIDE 180 MG: 20 INJECTION, SOLUTION INTRAMUSCULAR; INTRAVENOUS at 12:58

## 2022-02-07 RX ADMIN — HYDROMORPHONE HYDROCHLORIDE 2 MG: 2 TABLET ORAL at 20:31

## 2022-02-07 RX ADMIN — MORPHINE SULFATE 4 MG: 4 INJECTION, SOLUTION INTRAMUSCULAR; INTRAVENOUS at 09:33

## 2022-02-07 RX ADMIN — DEXAMETHASONE SODIUM PHOSPHATE 4 MG: 4 INJECTION, SOLUTION INTRAMUSCULAR; INTRAVENOUS at 13:14

## 2022-02-07 RX ADMIN — METRONIDAZOLE 500 MG: 500 INJECTION, SOLUTION INTRAVENOUS at 09:40

## 2022-02-07 RX ADMIN — LIDOCAINE HYDROCHLORIDE 80 MG: 20 INJECTION, SOLUTION EPIDURAL; INFILTRATION; INTRACAUDAL; PERINEURAL at 12:58

## 2022-02-07 RX ADMIN — ENOXAPARIN SODIUM 40 MG: 100 INJECTION SUBCUTANEOUS at 09:33

## 2022-02-07 RX ADMIN — DEXMEDETOMIDINE HYDROCHLORIDE 10 MCG: 100 INJECTION, SOLUTION, CONCENTRATE INTRAVENOUS at 12:48

## 2022-02-07 RX ADMIN — SODIUM CHLORIDE, SODIUM LACTATE, POTASSIUM CHLORIDE, AND CALCIUM CHLORIDE 25 ML/HR: 600; 310; 30; 20 INJECTION, SOLUTION INTRAVENOUS at 20:56

## 2022-02-07 RX ADMIN — ROCURONIUM BROMIDE 10 MG: 50 INJECTION INTRAVENOUS at 12:58

## 2022-02-07 RX ADMIN — HYDROMORPHONE HYDROCHLORIDE 0.5 MG: 2 INJECTION, SOLUTION INTRAMUSCULAR; INTRAVENOUS; SUBCUTANEOUS at 15:38

## 2022-02-07 RX ADMIN — DEXTROSE MONOHYDRATE AND SODIUM CHLORIDE 125 ML/HR: 5; .9 INJECTION, SOLUTION INTRAVENOUS at 00:11

## 2022-02-07 RX ADMIN — LEVOFLOXACIN 500 MG: 500 INJECTION, SOLUTION INTRAVENOUS at 08:33

## 2022-02-07 RX ADMIN — Medication 200 MCG: at 13:30

## 2022-02-07 RX ADMIN — POTASSIUM CHLORIDE 40 MEQ: 1500 TABLET, EXTENDED RELEASE ORAL at 06:46

## 2022-02-07 RX ADMIN — SODIUM CHLORIDE, SODIUM LACTATE, POTASSIUM CHLORIDE, AND CALCIUM CHLORIDE 25 ML/HR: 600; 310; 30; 20 INJECTION, SOLUTION INTRAVENOUS at 12:03

## 2022-02-07 RX ADMIN — ESMOLOL HYDROCHLORIDE 20 MG: 10 INJECTION, SOLUTION INTRAVENOUS at 13:19

## 2022-02-07 NOTE — ANESTHESIA POSTPROCEDURE EVALUATION
Procedure(s):  CHOLECYSTECTOMY XI ROBOTIC ASSISTED WITH FIREFLY. general    Anesthesia Post Evaluation      Multimodal analgesia: multimodal analgesia used between 6 hours prior to anesthesia start to PACU discharge  Patient location during evaluation: PACU  Patient participation: complete - patient participated  Level of consciousness: sleepy but conscious  Pain management: adequate  Airway patency: patent  Anesthetic complications: no  Cardiovascular status: acceptable  Respiratory status: acceptable  Hydration status: acceptable  Post anesthesia nausea and vomiting:  controlled  Final Post Anesthesia Temperature Assessment:  Normothermia (36.0-37.5 degrees C)      INITIAL Post-op Vital signs:   Vitals Value Taken Time   /69 02/07/22 1420   Temp 36.6 °C (97.9 °F) 02/07/22 1420   Pulse 74 02/07/22 1424   Resp 23 02/07/22 1424   SpO2 100 % 02/07/22 1424   Vitals shown include unvalidated device data.

## 2022-02-07 NOTE — PROGRESS NOTES
Reason for Admission:  Cholecystitis [K81.9]                 RUR Score:    12            Plan for utilizing home health: To be determined                      Likelihood of Readmission:   LOW                         Transition of Care Plan:              Initial assessment completed with patient. Cognitive status of patient: oriented to time, place, person and situation. Face sheet information confirmed:  yes. The patient designates her friend Chantal Troncoso to participate in her discharge plan and to receive any needed information. This patient lives in a apartment alone. Patient is able to navigate steps as needed. Prior to hospitalization, patient was considered to be independent with ADLs/IADLS : yes . Patient has a current ACP document on file: no      Healthcare Decision Maker:   Secondary Decision Maker: K[facility] branch - [phone]    Click here to complete [address] including selection of the Healthcare Decision Maker Relationship (ie \"Primary\")    The patient's uncle will be available to transport patient home upon discharge. The patient already has none reported  medical equipment available in the home. Patient is not currently active with home health. Patient has not stayed in a skilled nursing facility or rehab. Was  stay within last 60 days : no. This patient is on dialysis :no     Currently, the discharge plan is Home. The patient states that she can obtain her medications from the pharmacy, and take her medications as directed. Patient's current insurance is [email or web address] [employer]  PCP Verified by CM: Yes  Palliative Care Criteria Met (RRAT>21 & CHF Dx)?: No  Mode of Transport at Discharge:  Other (see comment) (family)  Support Systems: Other Family Member(s),Friend/Neighbor  Confirm Follow Up Transport: Friends  Discharge Location  Patient Expects to be Discharged to[de-identified] Home with family assistance        Enrrique Conteh BSN RN  Care Management  Pager: [phone]

## 2022-02-07 NOTE — ANESTHESIA PREPROCEDURE EVALUATION
Relevant Problems   PERSONAL HX & FAMILY HX OF CANCER   (+) Breast cancer ([facility] [address]       Anesthetic History   No history of anesthetic complications            Review of Systems / Medical History  Patient summary reviewed, nursing notes reviewed and pertinent labs reviewed    Pulmonary            Asthma : well controlled       Neuro/Psych   Within defined limits           Cardiovascular  Within defined limits                     GI/Hepatic/Renal  Within defined limits              Endo/Other  Within defined limits           Other Findings   Comments:  Tolerated prior geta for b/l mastectomy           Physical Exam    Airway  Mallampati: II  TM Distance: 4 - 6 cm  Neck ROM: normal range of motion   Mouth opening: Normal     Cardiovascular    Rhythm: regular  Rate: normal         Dental  No notable dental hx       Pulmonary  Breath sounds clear to auscultation               Abdominal  GI exam deferred       Other Findings            Anesthetic Plan    ASA: 2  Anesthesia type: general          Induction: Intravenous  Anesthetic plan and risks discussed with: Patient

## 2022-02-07 NOTE — ROUTINE PROCESS
TRANSFER - OUT REPORT:    Verbal report given to DAIANA Rodney on Ria Cruz  being transferred to [facility] for ordered procedure       Report consisted of patients Situation, Background, Assessment and   Recommendations(SBAR). Information from the following report(s) SBAR and Kardex was reviewed with the receiving nurse. Lines:   Peripheral IV 02/06/22 Right Antecubital (Active)   Site Assessment Clean, dry, & intact 02/06/22 2028   Phlebitis Assessment 0 02/06/22 2028   Infiltration Assessment 0 02/06/22 2028   Dressing Status Clean, dry, & intact 02/06/22 2028   Dressing Type Tape;Transparent 02/06/22 2028   Hub Color/Line Status Pink; Infusing 02/06/22 2028   Action Taken Open ports on tubing capped 02/06/22 2028   Alcohol Cap Used No 02/06/22 2028        Opportunity for questions and clarification was provided.       Patient transported with:   [employer]

## 2022-02-07 NOTE — PROGRESS NOTES
8835 lab notified me the critical result of K- 2.7 paged The hospitalist and notified him. Bedside and Verbal shift change report given to DAIANA Turner (oncoming nurse) by Jessica Arteaga RN (offgoing nurse). Report included the following information SBAR, Kardex, Intake/Output and MAR.

## 2022-02-07 NOTE — OP NOTES
[address] [place] [address] Dr Edwards [facility], [address] [facility]                                 OPERATIVE REPORT    PATIENT:    Antionette Foreman  MRN:           [id number]   DATE:   2022  BILLIN  ROOM:       OR/PL  ATTENDING:   Yulia Herrera MD  DICTATING:   Yulia Herrera MD      PREOPERATIVE DIAGNOSIS:  Chronic calculus cholecystitis    POSTOPERATIVE DIAGNOSIS: same    PROCEDURES PERFORMED: Robotic assisted laparoscopic cholecystectomy    SURGEON: Michelle Cazares MD    ASSISTANT: Alfie Anderson    ANESTHESIA: General and local 0.25% Marcaine plain. FINDINGS: chronic calculus cholecystitis    SPECIMENS REMOVED: Gallbladder. ESTIMATED BLOOD LOSS: 30 mL    FLUIDS:  1000 mL    IMPLANTS:  None    OPERATIVE INDICATION: chronic calculus cholecystitis    DESCRIPTION OF PROCEDURE: The patient was identified in the holding area, where consent for laparoscopic, possible open, cholecystectomy was verified. Once in the operating room, the patient was placed supine and general anesthesia was induced. The abdomen was prepped and draped in sterile fashion using chlorhexidine solution and sterile drapes. Patient had 3 mg of indocyanine green injected in the holding area. The robotic and laparoscopic equipment were assembled and proper functioning was visualized prior to making the initial incision. The local anesthetic was infiltrated into the skin and deep dermal tissues at each incision prior to the incision being made. The initial trocar was placed at the umbilicus using the optiview technique. The 5 mm 0 degree scope was assembled to the 8 mm blunt tipped trocar. Safe entry into the peritoneal cavity was visualized. The insufflation was obtained using carbon dioxide gas to 15 mmHg. The 8 mm blunt tipped trocar was then placed in the left upper quadrant. An 8 mm trocar in the right lower quadrant.   A 5 mm trocar was placed in the right subcostal position. The patient was placed in reverse Trendelenburg with the right side up. The surgical cart was guided into position and the trocars were docked. The fundus of the gallbladder was identified and retracted anterior,  cephalad, and lateral.  Blunt dissection was employed to visualize the cystic duct infundibulum junction. Firefly was used visualize the cystic duct and common bile duct. The cystic duct was dissected free circumferentially and clipped and ligated in standard fashion. The cystic artery was identified and clipped and ligated in standard fashion. The gallbladder was removed from the gallbladder fossa using electrocautery for both hemostasis and dissection. The gallbladder was removed from the peritoneal cavity at the umbilical trocar in an Endocatch. The right lower quadrant trocar site was closed using 0 Vicryl suture and a suture passer. The area of the clips was inspected and found to be free of  any bleeding or bile leak. The pneumoperitoneum was allowed to deflate. 4-0 Monocryl suture was used to close the incision sites in interrupted stitches. Mastisol, steristrips and bandaids were used to create dressings. The patient tolerated the procedure very well. DISPOSITION: She was extubated and stable upon transport to the recovery  room.       Elva Almaraz MD, FACS

## 2022-02-07 NOTE — PROGRESS NOTES
Patient arrived to floor from PACU via stretcher oriented to room. Call bell within reach. Will continue to monitor.

## 2022-02-07 NOTE — PERIOP NOTES
Assumed care of pt from OR via bed. Attached to monitor. VSS. OR, MAR and anesthesia report appreciated. Will cont to monitor. 1600  TRANSFER - OUT REPORT:    Verbal report given to DAIANA Turner (name) on [facility]       Report consisted of patients Situation, Background, Assessment and   Recommendations(SBAR). Information from the following report(s) SBAR, OR Summary, MAR and Cardiac Rhythm sinus rhythm was reviewed with the receiving nurse. Lines:   Peripheral IV 02/06/22 Right Antecubital (Active)   Site Assessment Clean, dry, & intact 02/06/22 2028   Phlebitis Assessment 0 02/06/22 2028   Infiltration Assessment 0 02/06/22 2028   Dressing Status Clean, dry, & intact 02/06/22 2028   Dressing Type Tape;Transparent 02/06/22 2028   Hub Color/Line Status Pink; Infusing 02/06/22 2028   Action Taken Open ports on tubing capped 02/06/22 2028   Alcohol Cap Used No 02/06/22 2028       Peripheral IV 02/07/22 Anterior;Distal;Left Forearm (Active)   Site Assessment Clean, dry, & intact 02/07/22 1153   Phlebitis Assessment 0 02/07/22 1153   Infiltration Assessment 0 02/07/22 1153   Dressing Status Clean, dry, & intact 02/07/22 1153   Dressing Type Tape;Transparent 02/07/22 1153   Hub Color/Line Status Blue;Flushed; Infusing 02/07/22 1153   Action Taken Dressing reinforced 02/07/22 1153   Alcohol Cap Used No 02/07/22 1153        Opportunity for questions and clarification was provided.       Patient transported with:   [employer]

## 2022-02-07 NOTE — ROUTINE PROCESS
TRANSFER - IN REPORT:    Verbal report received from [facility]) on Fransisca Gardner  being received from [address] [place] [address](unit) for ordered procedure      Report consisted of patients Situation, Background, Assessment and   Recommendations(SBAR). Information from the following report(s) SBAR and Kardex was reviewed with the receiving nurse. Opportunity for questions and clarification was provided. Assessment completed upon patients arrival to unit and care assumed.

## 2022-02-07 NOTE — ROUTINE PROCESS
TRANSFER - IN REPORT:    Verbal report received from DAIANA Turner on Magalys Henriquez  being received from [facility] for ordered procedure      Report consisted of patients Situation, Background, Assessment and   Recommendations(SBAR). Information from the following report(s) SBAR and Kardex was reviewed with the receiving nurse. Opportunity for questions and clarification was provided. Assessment completed upon patients arrival to unit and care assumed.

## 2022-02-08 LAB
ALBUMIN SERPL-MCNC: 2.4 G/DL (ref 3.4–5)
ALBUMIN/GLOB SERPL: 0.7 {RATIO} (ref 0.8–1.7)
ALP SERPL-CCNC: 157 U/L (ref 45–117)
ALT SERPL-CCNC: 269 U/L (ref 13–56)
ANION GAP SERPL CALC-SCNC: 8 MMOL/L (ref 3–18)
AST SERPL-CCNC: 266 U/L (ref 10–38)
BILIRUB DIRECT SERPL-MCNC: 1.4 MG/DL (ref 0–0.2)
BILIRUB SERPL-MCNC: 2.3 MG/DL (ref 0.2–1)
BUN SERPL-MCNC: 4 MG/DL (ref 7–18)
BUN/CREAT SERPL: 4 (ref 12–20)
CALCIUM SERPL-MCNC: 8.5 MG/DL (ref 8.5–10.1)
CHLORIDE SERPL-SCNC: 107 MMOL/L (ref 100–111)
CO2 SERPL-SCNC: 26 MMOL/L (ref 21–32)
CREAT SERPL-MCNC: 0.94 MG/DL (ref 0.6–1.3)
GLOBULIN SER CALC-MCNC: 3.4 G/DL (ref 2–4)
GLUCOSE SERPL-MCNC: 168 MG/DL (ref 74–99)
MAGNESIUM SERPL-MCNC: 1.8 MG/DL (ref 1.6–2.3)
POTASSIUM SERPL-SCNC: 3.4 MMOL/L (ref 3.5–5.5)
PROT SERPL-MCNC: 5.8 G/DL (ref 6.4–8.2)
SODIUM SERPL-SCNC: 141 MMOL/L (ref 136–145)

## 2022-02-08 PROCEDURE — 74011000250 HC RX REV CODE- 250: Performed by: SURGERY

## 2022-02-08 PROCEDURE — 80076 HEPATIC FUNCTION PANEL: CPT

## 2022-02-08 PROCEDURE — 74011250636 HC RX REV CODE- 250/636: Performed by: SURGERY

## 2022-02-08 PROCEDURE — 65270000029 HC RM PRIVATE

## 2022-02-08 PROCEDURE — 36415 COLL VENOUS BLD VENIPUNCTURE: CPT

## 2022-02-08 PROCEDURE — 80048 BASIC METABOLIC PNL TOTAL CA: CPT

## 2022-02-08 PROCEDURE — 74011250637 HC RX REV CODE- 250/637: Performed by: SURGERY

## 2022-02-08 RX ORDER — HYDROMORPHONE HYDROCHLORIDE 4 MG/1
4 TABLET ORAL
Status: DISCONTINUED | OUTPATIENT
Start: 2022-02-08 | End: 2022-02-10 | Stop reason: HOSPADM

## 2022-02-08 RX ADMIN — SODIUM CHLORIDE, PRESERVATIVE FREE 10 ML: 5 INJECTION INTRAVENOUS at 06:00

## 2022-02-08 RX ADMIN — SODIUM CHLORIDE, PRESERVATIVE FREE 10 ML: 5 INJECTION INTRAVENOUS at 14:40

## 2022-02-08 RX ADMIN — METRONIDAZOLE 500 MG: 500 INJECTION, SOLUTION INTRAVENOUS at 08:54

## 2022-02-08 RX ADMIN — MORPHINE SULFATE 4 MG: 4 INJECTION, SOLUTION INTRAMUSCULAR; INTRAVENOUS at 12:44

## 2022-02-08 RX ADMIN — MORPHINE SULFATE 4 MG: 4 INJECTION, SOLUTION INTRAMUSCULAR; INTRAVENOUS at 03:58

## 2022-02-08 RX ADMIN — MORPHINE SULFATE 4 MG: 4 INJECTION, SOLUTION INTRAMUSCULAR; INTRAVENOUS at 06:57

## 2022-02-08 RX ADMIN — SODIUM CHLORIDE, PRESERVATIVE FREE 10 ML: 5 INJECTION INTRAVENOUS at 21:05

## 2022-02-08 RX ADMIN — MORPHINE SULFATE 4 MG: 4 INJECTION, SOLUTION INTRAMUSCULAR; INTRAVENOUS at 10:22

## 2022-02-08 RX ADMIN — HYDROMORPHONE HYDROCHLORIDE 4 MG: 4 TABLET ORAL at 21:05

## 2022-02-08 RX ADMIN — HYDROMORPHONE HYDROCHLORIDE 2 MG: 2 TABLET ORAL at 01:53

## 2022-02-08 RX ADMIN — HYDROMORPHONE HYDROCHLORIDE 2 MG: 2 TABLET ORAL at 13:47

## 2022-02-08 RX ADMIN — LEVOFLOXACIN 500 MG: 500 INJECTION, SOLUTION INTRAVENOUS at 07:56

## 2022-02-08 RX ADMIN — ENOXAPARIN SODIUM 40 MG: 100 INJECTION SUBCUTANEOUS at 08:53

## 2022-02-08 NOTE — PROGRESS NOTES
1915 Bedside and Verbal shift change  Received from DAIANA Turner (outgoing nurse), to JEANNETTE De Leon (oncoming)  Pt. Is AOX 4. IV patent and infusing well, Pt. denies  pain at this time. Report included the following information SBAR, Kardex, Procedure Summary, Intake/Output, MAR, Recent Lab Results. Will resume care and monitor Pt. Condition. Pt. Educated on call bell when in need of help and assistance. Pt.verbalized understanding. 2020 Pt. Head to toe Assessment Done and documented. Pt. Given dilaudid po per [facility] for pain management. P. OOB with assist to bathroom. Voided cyu at moderate amount. 2130  Pt. Resting in bed comfortably, not in distress. 2300  Pt. Not in distress. 0030  Pt. Resting with eyes closed, easily awaken. 0153  Pt. Given pain meds per MAR.    0300  Pt. Resting comfortably in bed.    0358  Pt. Given morphine per [facility] for pain management. 0445  Pt. Resting in bed, not in distress. 0630 Pt. Able to rest and sleep in between care. 0700  Pt. Given morphine for abd pain. Verbal and bedside Shift changed report given to DAIANA Turner (oncoming RN) on Pt. Condition. Report consisted of patients Situation, History, Activities, intake/output,  Background, Assessment and Recommendations(SBAR). Information from the following report(s) Kardex, order Summary, Lab results and MAR was reviewed with the receiving nurse. Opportunity for questions and clarification was provided.

## 2022-02-08 NOTE — PROGRESS NOTES
conducted an initial consultation and Spiritual Assessment for Zaynab Long, who is a 31 y.o.,female. Patients Primary Language is: [place]. According to the patients EMR Restorationist Affiliation is: Rios Dickinson. The reason the Patient came to the hospital is:   Patient Active Problem List    Diagnosis Date Noted    Cholecystitis 02/06/2022    Breast cancer ([facility] [address] 08/26/2021    Felon of finger of right hand 07/05/2021    Cellulitis and abscess of hand 07/05/2021    Pregnancy 07/30/2016        The  provided the following Interventions:  Initiated a relationship of care and support. Provided information about Spiritual Care Services. Chart reviewed. The following outcomes where achieved:  Patient expressed gratitude for 's visit. Assessment:  Patient does not have any Christianity/cultural needs that will affect patients preferences in health care. There are no spiritual or Christianity issues which require intervention at this time. Plan:  Chaplains will continue to follow and will provide pastoral care on an as needed/requested basis.  recommends bedside caregivers page  on duty if patient shows signs of acute spiritual or emotional distress.     [address]  ([phone])

## 2022-02-08 NOTE — PROGRESS NOTES
Problem: Falls - Risk of  Goal: *Absence of Falls  Description: Document Cleo Clementdomitilamckenzie Fall Risk and appropriate interventions in the flowsheet.   Outcome: Progressing Towards Goal  Note: Fall Risk Interventions:            Medication Interventions: Patient to call before getting OOB                   Problem: Patient Education: Go to Patient Education Activity  Goal: Patient/Family Education  Outcome: Progressing Towards Goal     Problem: Pain  Goal: *Control of Pain  Outcome: Progressing Towards Goal     Problem: Patient Education: Go to Patient Education Activity  Goal: Patient/Family Education  Outcome: Progressing Towards Goal

## 2022-02-08 NOTE — PROGRESS NOTES
Steven Frank M.D. FACS  PROGRESS NOTE    Name: Dane Keenan MRN: [id number]   : 1995 Hospital: [facility]   Date: 2022 Admission Date: 2022  5:00 AM     Hospital Day: 3  1 Day Post-Op  Subjective:  Patient without acute overnight events. No nausea vomiting. Tolerating clear liquid diet. Objective:  Vitals:    22 1610 22 1614 22 2042 22 0349   BP: 99/61  120/75 127/70   Pulse: (!) 59 63 72 68   Resp: 18 18 18 18   Temp:   97.4 °F (36.3 °C) 98.2 °F (36.8 °C)   SpO2: 100%  98% 98%   Weight:       Height:         Date 22 0700 - 22 0659 22 0700 - 22 0659   Shift 7881-0337 7212-3496 24 Hour Total 7251-6964 3208-8893 24 Hour Total   INTAKE   I.V.(mL/kg/hr) 2000(1.6)  2000(0.8) 400  400     I.V. 1000  1000        Volume (dextrose 5% and 0.9% NaCl infusion)    400  400     Volume (lactated Ringers infusion) 1000  1000      Shift Total(mL/kg) (19.7)  2000(19.7) 400(3.9)  400(3.9)   OUTPUT   Urine(mL/kg/hr)           Urine Occurrence(s)  1 x 1 x      Blood 30  30        Estimated Blood Loss 30  30      Shift Total(mL/kg) 30(0.3)  30(0.3)      NET 1970 400  400   Weight (kg) 101.6 101.6 101.6 101.6 101.6 101.6         Physical Exam:    General: Awake and alert, oriented x4, no apparent distress   Abdomen: abdomen is soft with incisional tenderness. Incision(s) are C/D/I.   No masses, organomegaly or guarding   Extremities: No c/c/e BLE  Labs:  Recent Results (from the past 24 hour(s))   METABOLIC PANEL, BASIC    Collection Time: 22  3:46 AM   Result Value Ref Range    Sodium 141 136 - 145 mmol/L    Potassium 3.4 (L) 3.5 - 5.5 mmol/L    Chloride 107 100 - 111 mmol/L    CO2 26 21 - 32 mmol/L    Anion gap 8 3.0 - 18 mmol/L    Glucose 168 (H) 74 - 99 mg/dL    BUN 4 (L) 7.0 - 18 MG/DL    Creatinine 0.94 0.6 - 1.3 MG/DL    BUN/Creatinine ratio 4 (L) 12 - 20      GFR est AA >60 >60 ml/min/1.73m2    GFR est non-AA >60 >60 ml/min/1.73m2    Calcium 8.5 8.5 - 10.1 MG/DL     All Micro Results     Procedure Component Value Units Date/Time    CULTURE, BLOOD [[id number]] Collected: 02/06/22 0610    Order Status: Completed Specimen: Blood Updated: 02/08/22 0639     Special Requests: NO SPECIAL REQUESTS        Culture result: NO GROWTH 2 DAYS       CULTURE, BLOOD [[id number]] Collected: 02/06/22 0620    Order Status: Completed Specimen: Blood Updated: 02/08/22 0639     Special Requests: NO SPECIAL REQUESTS        Culture result: NO GROWTH 2 DAYS       COVID-19 RAPID TEST [[id number]] Collected: 02/06/22 1137    Order Status: Completed Specimen: Nasopharyngeal Updated: 02/06/22 1225     Specimen source Nasopharyngeal        COVID-19 rapid test Not detected        Comment: Rapid Abbott ID Now       Rapid NAAT:  The specimen is NEGATIVE for SARS-CoV-2, the novel coronavirus associated with COVID-19. Negative results should be treated as presumptive and, if inconsistent with clinical signs and symptoms or necessary for patient management, should be tested with an alternative molecular assay. Negative results do not preclude SARS-CoV-2 infection and should not be used as the sole basis for patient management decisions. This test has been authorized by the FDA under an Emergency Use Authorization (EUA) for use by authorized laboratories.    Fact sheet for Healthcare Providers: kstattoo.com  Fact sheet for Patients: kstattoo.com       Methodology: Isothermal Nucleic Acid Amplification               Current Medications:  Current Facility-Administered Medications   Medication Dose Route Frequency Provider Last Rate Last Admin    lactated Ringers infusion  25 mL/hr IntraVENous CONTINUOUS Nabil Colunga MD 25 mL/hr at 02/07/22 2056 25 mL/hr at 02/07/22 2056    HYDROmorphone (DILAUDID) tablet 2 mg  2 mg Oral Q4H PRN Hayley Rowan MD   2 mg at 02/08/22 0153    sodium chloride (NS) flush 5-40 mL  5-40 mL IntraVENous Q8H Aleksandr Marte MD   10 mL at 02/08/22 0600    sodium chloride (NS) flush 5-40 mL  5-40 mL IntraVENous PRN Aleksandr Marte MD        dextrose 5% and 0.9% NaCl infusion  125 mL/hr IntraVENous CONTINUOUS Aleksandr Marte  mL/hr at 02/07/22 0835 125 mL/hr at 02/07/22 0835    naloxone (NARCAN) injection 0.4 mg  0.4 mg IntraVENous PRN Aleksandr Marte MD        morphine injection 4 mg  4 mg IntraVENous Q2H PRN Aleksandr Marte MD   4 mg at 02/08/22 0657    ondansetron (ZOFRAN) injection 4 mg  4 mg IntraVENous Q4H PRN Aleksandr Marte MD        diphenhydrAMINE (BENADRYL) injection 12.5 mg  12.5 mg IntraVENous Q4H PRN Aleksandr Marte MD        bisacodyL (DULCOLAX) suppository 10 mg  10 mg Rectal DAILY PRN Aleksandr Marte MD        LORazepam (ATIVAN) injection 1 mg  1 mg IntraVENous Q6H PRN Aleksandr Maret MD        enoxaparin (LOVENOX) injection 40 mg  40 mg SubCUTAneous DAILY Aleksandr Marte MD   40 mg at 02/07/22 0933    levoFLOXacin (LEVAQUIN) 500 mg in D5W IVPB  500 mg IntraVENous Q24H Aleksandr Marte  mL/hr at 02/08/22 0756 500 mg at 02/08/22 0756    metroNIDAZOLE (FLAGYL) IVPB premix 500 mg  500 mg IntraVENous Q12H Aleksandr Marte  mL/hr at 02/07/22 2031 500 mg at 02/07/22 2031       Chart and notes reviewed. Data reviewed. I have evaluated and examined the patient.         IMPRESSION:   · Patient doing well postop day 1 from [facility] barrie      PLAN:/DISCUSION:   · Liver function tests  · Advance diet  · Possible discharge home today        Maile Mondragon MD

## 2022-02-09 ENCOUNTER — HOSPITAL ENCOUNTER (INPATIENT)
Dept: NUCLEAR MEDICINE | Age: 27
Discharge: HOME | DRG: 263 | End: 2022-02-09
Attending: SURGERY
Payer: MEDICAID

## 2022-02-09 LAB
ALBUMIN SERPL-MCNC: 2.2 G/DL (ref 3.4–5)
ALBUMIN/GLOB SERPL: 0.7 {RATIO} (ref 0.8–1.7)
ALP SERPL-CCNC: 161 U/L (ref 45–117)
ALT SERPL-CCNC: 220 U/L (ref 13–56)
AST SERPL-CCNC: 150 U/L (ref 10–38)
BASOPHILS # BLD: 0 K/UL (ref 0–0.1)
BASOPHILS NFR BLD: 1 % (ref 0–2)
BILIRUB DIRECT SERPL-MCNC: 2.2 MG/DL (ref 0–0.2)
BILIRUB SERPL-MCNC: 3 MG/DL (ref 0.2–1)
DIFFERENTIAL METHOD BLD: ABNORMAL
EOSINOPHIL # BLD: 0.1 K/UL (ref 0–0.4)
EOSINOPHIL NFR BLD: 1 % (ref 0–5)
ERYTHROCYTE [DISTWIDTH] IN BLOOD BY AUTOMATED COUNT: 22.5 % (ref 11.6–14.5)
GLOBULIN SER CALC-MCNC: 3.1 G/DL (ref 2–4)
HCT VFR BLD AUTO: 30.3 % (ref 35–45)
HGB BLD-MCNC: 9.6 G/DL (ref 12–16)
IMM GRANULOCYTES # BLD AUTO: 0.1 K/UL (ref 0–0.04)
IMM GRANULOCYTES NFR BLD AUTO: 1 % (ref 0–0.5)
LYMPHOCYTES # BLD: 1.4 K/UL (ref 0.9–3.6)
LYMPHOCYTES NFR BLD: 33 % (ref 21–52)
MCH RBC QN AUTO: 27.2 PG (ref 24–34)
MCHC RBC AUTO-ENTMCNC: 31.7 G/DL (ref 31–37)
MCV RBC AUTO: 85.8 FL (ref 78–100)
MONOCYTES # BLD: 0.7 K/UL (ref 0.05–1.2)
MONOCYTES NFR BLD: 17 % (ref 3–10)
NEUTS SEG # BLD: 2 K/UL (ref 1.8–8)
NEUTS SEG NFR BLD: 48 % (ref 40–73)
NRBC # BLD: 0.04 K/UL (ref 0–0.01)
NRBC BLD-RTO: 1 PER 100 WBC
PLATELET # BLD AUTO: 339 K/UL (ref 135–420)
PMV BLD AUTO: 8.7 FL (ref 9.2–11.8)
PROT SERPL-MCNC: 5.3 G/DL (ref 6.4–8.2)
RBC # BLD AUTO: 3.53 M/UL (ref 4.2–5.3)
WBC # BLD AUTO: 4.1 K/UL (ref 4.6–13.2)

## 2022-02-09 PROCEDURE — 74011250636 HC RX REV CODE- 250/636: Performed by: SURGERY

## 2022-02-09 PROCEDURE — 74011000250 HC RX REV CODE- 250: Performed by: SURGERY

## 2022-02-09 PROCEDURE — 85025 COMPLETE CBC W/AUTO DIFF WBC: CPT

## 2022-02-09 PROCEDURE — 65270000029 HC RM PRIVATE

## 2022-02-09 PROCEDURE — 78226 HEPATOBILIARY SYSTEM IMAGING: CPT

## 2022-02-09 PROCEDURE — 36415 COLL VENOUS BLD VENIPUNCTURE: CPT

## 2022-02-09 PROCEDURE — 74011250637 HC RX REV CODE- 250/637: Performed by: SURGERY

## 2022-02-09 PROCEDURE — 80076 HEPATIC FUNCTION PANEL: CPT

## 2022-02-09 RX ORDER — KIT FOR THE PREPARATION OF TECHNETIUM TC 99M MEBROFENIN 45 MG/10ML
6.6 INJECTION, POWDER, LYOPHILIZED, FOR SOLUTION INTRAVENOUS
Status: COMPLETED | OUTPATIENT
Start: 2022-02-09 | End: 2022-02-09

## 2022-02-09 RX ORDER — MINERAL OIL
30 OIL (ML) ORAL ONCE
Status: COMPLETED | OUTPATIENT
Start: 2022-02-09 | End: 2022-02-09

## 2022-02-09 RX ORDER — FACIAL-BODY WIPES
10 EACH TOPICAL ONCE
Status: COMPLETED | OUTPATIENT
Start: 2022-02-09 | End: 2022-02-09

## 2022-02-09 RX ADMIN — BISACODYL 10 MG: 10 SUPPOSITORY RECTAL at 08:13

## 2022-02-09 RX ADMIN — SODIUM CHLORIDE, PRESERVATIVE FREE 10 ML: 5 INJECTION INTRAVENOUS at 21:33

## 2022-02-09 RX ADMIN — HYDROMORPHONE HYDROCHLORIDE 4 MG: 4 TABLET ORAL at 14:48

## 2022-02-09 RX ADMIN — SODIUM CHLORIDE, PRESERVATIVE FREE 10 ML: 5 INJECTION INTRAVENOUS at 06:08

## 2022-02-09 RX ADMIN — HYDROMORPHONE HYDROCHLORIDE 4 MG: 4 TABLET ORAL at 08:54

## 2022-02-09 RX ADMIN — ENOXAPARIN SODIUM 40 MG: 100 INJECTION SUBCUTANEOUS at 08:14

## 2022-02-09 RX ADMIN — KIT FOR THE PREPARATION OF TECHNETIUM TC 99M MEBROFENIN 6.6 MILLICURIE: 45 INJECTION, POWDER, LYOPHILIZED, FOR SOLUTION INTRAVENOUS at 13:00

## 2022-02-09 RX ADMIN — LEVOFLOXACIN 500 MG: 500 INJECTION, SOLUTION INTRAVENOUS at 08:14

## 2022-02-09 RX ADMIN — HYDROMORPHONE HYDROCHLORIDE 4 MG: 4 TABLET ORAL at 04:10

## 2022-02-09 RX ADMIN — MINERAL OIL 30 ML: 1000 SOLUTION ORAL at 08:15

## 2022-02-09 NOTE — PROGRESS NOTES
Nurse made aware of patient's MEWS score of 4. VS retaken 125/80, 104, 20, 98.9, 96% RA. MEWS score 2. Dr. Adonay Tobar made aware. No new orders given.

## 2022-02-09 NOTE — PROGRESS NOTES
1930 Bedside and Verbal shift change  Received from DAIANA Turner (outgoing nurse), to JEANNETTE Downey (oncoming)  Pt. Is AOX 4. IV SK, Pt. denies  pain at this time. Report included the following information SBAR, Kardex, Procedure Summary, Intake/Output, MAR, Recent Lab Results,. Will resume care and monitor Pt. Condition. Pt. Educated on call bell when in need of help and assistance. Pt. verbalized understanding. Pt. Head to toe Assessment Done and documented. 2030  Pt. Made no complaints. 2105  Pt. Given pain meds per MAR.    2200  Pt. Resting in bed, not in distress. 2330  Pt. Denies pain. 0030  Pt. Resting with eyes closed, easily awaken. 0230  Pt. Made no complaints. 0410  Pt. Given pain med per MAR.    0600  Pt. Able to rest and sleep well throughout the shift. Verbal and bedside Shift changed report given to DAIANA Turner (oncoming RN) on Pt. Condition. Report consisted of patients Situation, History, Activities, intake/output,  Background, Assessment and Recommendations(SBAR).    Information from the following report(s) Kardex, order Summary, Lab results and MAR was reviewed with the receiving nurse.     Opportunity for questions and clarification was provided.

## 2022-02-09 NOTE — PROGRESS NOTES
Steven Rose M.D. FACS  PROGRESS NOTE    Name: Jameson Mullen MRN: [id number]   : 1995 Hospital: [facility]   Date: 2022 Admission Date: 2022  5:00 AM     Hospital Day: 4  2 Days Post-Op  Subjective:  Patient without acute overnight events. She denies bowel movement or flatus. Bilirubin this morning continues to climb 3 up from 2.4. She denies nausea vomiting. Objective:  Vitals:    22 1252 22 1718 22 0352   BP: 117/61 123/72 116/74 115/74   Pulse: 98 94 99 (!) 111   Resp: 20 20 20 20   Temp: 97.9 °F (36.6 °C) 98 °F (36.7 °C) 98.7 °F (37.1 °C) 100.2 °F (37.9 °C)   SpO2: 97% 95% 97% 98%   Weight:       Height:         Date 22 07 - 22 0659 22 07 - 02/10/22 0659   Shift 1599-4697 8281-5506 24 Hour Total 6041-5750 1628-5987 24 Hour Total   INTAKE   I.V.(mL/kg/hr) 400(0.3)  400(0.2)        Volume (dextrose 5% and 0.9% NaCl infusion) 400  400      Shift Total(mL/kg) 400(3.9)  400(3.9)      OUTPUT   Urine(mL/kg/hr) 300(0.2)  300(0.1)        Urine Voided 300  300      Shift Total(mL/kg) 300(2.9)  300(2.9)        100      Weight (kg) 103.1 103.1 103.1 103.1 103.1 103.1         Physical Exam:    General: Awake and alert, oriented x4, no apparent distress   Abdomen: abdomen is soft with incisional tenderness. Incision(s) are C/D/I. No masses, organomegaly or guarding   Extremities: No c/c/e BLE  Labs:  No results found for this or any previous visit (from the past 24 hour(s)).   All Micro Results     Procedure Component Value Units Date/Time    CULTURE, BLOOD [[id number]] Collected: 22    Order Status: Completed Specimen: Blood Updated: 22     Special Requests: NO SPECIAL REQUESTS        Culture result: NO GROWTH 3 DAYS       CULTURE, BLOOD [[id number]] Collected: 22    Order Status: Completed Specimen: Blood Updated: 22     Special Requests: NO SPECIAL REQUESTS        Culture result: NO GROWTH 3 DAYS       COVID-19 RAPID TEST [[id number]] Collected: 02/06/22 1137    Order Status: Completed Specimen: Nasopharyngeal Updated: 02/06/22 1225     Specimen source Nasopharyngeal        COVID-19 rapid test Not detected        Comment: Rapid Abbott ID Now       Rapid NAAT:  The specimen is NEGATIVE for SARS-CoV-2, the novel coronavirus associated with COVID-19. Negative results should be treated as presumptive and, if inconsistent with clinical signs and symptoms or necessary for patient management, should be tested with an alternative molecular assay. Negative results do not preclude SARS-CoV-2 infection and should not be used as the sole basis for patient management decisions. This test has been authorized by the FDA under an Emergency Use Authorization (EUA) for use by authorized laboratories.    Fact sheet for Healthcare Providers: Dwayne  Fact sheet for Patients: Dwayne       Methodology: Isothermal Nucleic Acid Amplification               Current Medications:  Current Facility-Administered Medications   Medication Dose Route Frequency Provider Last Rate Last Admin    HYDROmorphone (DILAUDID) tablet 4 mg  4 mg Oral Q4H PRN Aleksandr Marte MD   4 mg at 02/09/22 0410    lactated Ringers infusion  25 mL/hr IntraVENous CONTINUOUS Aleksandr Marte MD 25 mL/hr at 02/07/22 2056 25 mL/hr at 02/07/22 2056    sodium chloride (NS) flush 5-40 mL  5-40 mL IntraVENous Q8H Aleksandr Marte MD   10 mL at 02/09/22 3683    sodium chloride (NS) flush 5-40 mL  5-40 mL IntraVENous PRN Aleksandr Marte MD        dextrose 5% and 0.9% NaCl infusion  125 mL/hr IntraVENous CONTINUOUS Aleksandr Marte  mL/hr at 02/07/22 0835 125 mL/hr at 02/07/22 0835    naloxone (NARCAN) injection 0.4 mg  0.4 mg IntraVENous PRN Aleksandr Marte MD        ondansetron [facility]) injection 4 mg  4 mg IntraVENous Q4H PRN Kanwal Clarke Steve Biggs MD        diphenhydrAMINE (BENADRYL) injection 12.5 mg  12.5 mg IntraVENous Q4H PRN Edouard Jean Baptiste MD        bisacodyL (DULCOLAX) suppository 10 mg  10 mg Rectal DAILY PRN Edouard Jean Baptiste MD        LORazepam (ATIVAN) injection 1 mg  1 mg IntraVENous Q6H PRN Edouard Jean Baptiste MD        enoxaparin (LOVENOX) injection 40 mg  40 mg SubCUTAneous DAILY Edouard Jean Baptiste MD   40 mg at 02/08/22 0853    levoFLOXacin (LEVAQUIN) 500 mg in D5W IVPB  500 mg IntraVENous Q24H Edouard Jean Baptiste  mL/hr at 02/08/22 0756 500 mg at 02/08/22 0756       Chart and notes reviewed. Data reviewed. I have evaluated and examined the patient. IMPRESSION:   · Patient is postop day 2 from robot-assisted laparoscopic cholecystectomy with firefly      PLAN:/DISCUSION:   · Hyperbilirubinemia-unable to perform MRCP due to breast implants.   We will proceed with HIDA scan  · N.p.o.  · Continue present care        Kwame Lea MD

## 2022-02-09 NOTE — PROGRESS NOTES
2/9/2022 10:37 am Patient is unable to have a MRI due to the implanted Artoura breast tissue expanders that are not safe for MRI. Discussed with the nurse Miky Juan RN and Dr Adonay Tobar who had agreed to cancel.

## 2022-02-10 ENCOUNTER — DOCUMENTATION ONLY (OUTPATIENT)
Dept: SURGERY | Age: 27
End: 2022-02-10

## 2022-02-10 VITALS
TEMPERATURE: 98.2 F | DIASTOLIC BLOOD PRESSURE: 81 MMHG | HEART RATE: 100 BPM | RESPIRATION RATE: 18 BRPM | SYSTOLIC BLOOD PRESSURE: 122 MMHG | OXYGEN SATURATION: 100 % | BODY MASS INDEX: 48.32 KG/M2 | HEIGHT: 60 IN | WEIGHT: 246.1 LBS

## 2022-02-10 LAB
ALBUMIN SERPL-MCNC: 2.2 G/DL (ref 3.4–5)
ALBUMIN/GLOB SERPL: 0.6 {RATIO} (ref 0.8–1.7)
ALP SERPL-CCNC: 157 U/L (ref 45–117)
ALT SERPL-CCNC: 171 U/L (ref 13–56)
ANION GAP SERPL CALC-SCNC: 6 MMOL/L (ref 3–18)
AST SERPL-CCNC: 75 U/L (ref 10–38)
BILIRUB SERPL-MCNC: 1.5 MG/DL (ref 0.2–1)
BUN SERPL-MCNC: 3 MG/DL (ref 7–18)
BUN/CREAT SERPL: 4 (ref 12–20)
CALCIUM SERPL-MCNC: 8.5 MG/DL (ref 8.5–10.1)
CHLORIDE SERPL-SCNC: 105 MMOL/L (ref 100–111)
CO2 SERPL-SCNC: 29 MMOL/L (ref 21–32)
CREAT SERPL-MCNC: 0.77 MG/DL (ref 0.6–1.3)
GLOBULIN SER CALC-MCNC: 3.7 G/DL (ref 2–4)
GLUCOSE SERPL-MCNC: 147 MG/DL (ref 74–99)
POTASSIUM SERPL-SCNC: 3.1 MMOL/L (ref 3.5–5.5)
PROT SERPL-MCNC: 5.9 G/DL (ref 6.4–8.2)
SODIUM SERPL-SCNC: 140 MMOL/L (ref 136–145)

## 2022-02-10 PROCEDURE — 74011250636 HC RX REV CODE- 250/636: Performed by: SURGERY

## 2022-02-10 PROCEDURE — 36415 COLL VENOUS BLD VENIPUNCTURE: CPT

## 2022-02-10 PROCEDURE — 80053 COMPREHEN METABOLIC PANEL: CPT

## 2022-02-10 PROCEDURE — 2709999900 HC NON-CHARGEABLE SUPPLY

## 2022-02-10 PROCEDURE — 74011000250 HC RX REV CODE- 250: Performed by: SURGERY

## 2022-02-10 PROCEDURE — 74011250637 HC RX REV CODE- 250/637: Performed by: SURGERY

## 2022-02-10 RX ORDER — IBUPROFEN 600 MG/1
600 TABLET ORAL EVERY 6 HOURS
Qty: 28 TABLET | Refills: 0 | Status: SHIPPED | OUTPATIENT
Start: 2022-02-10 | End: 2022-02-20

## 2022-02-10 RX ORDER — DOCUSATE SODIUM 100 MG/1
100 CAPSULE, LIQUID FILLED ORAL 2 TIMES DAILY
Qty: 60 CAPSULE | Refills: 2 | Status: SHIPPED | OUTPATIENT
Start: 2022-02-10 | End: 2022-02-15

## 2022-02-10 RX ORDER — HYDROMORPHONE HYDROCHLORIDE 2 MG/1
2 TABLET ORAL
Qty: 12 TABLET | Refills: 0 | Status: SHIPPED | OUTPATIENT
Start: 2022-02-10 | End: 2022-02-13

## 2022-02-10 RX ADMIN — SODIUM CHLORIDE, PRESERVATIVE FREE 10 ML: 5 INJECTION INTRAVENOUS at 15:38

## 2022-02-10 RX ADMIN — HYDROMORPHONE HYDROCHLORIDE 4 MG: 4 TABLET ORAL at 08:37

## 2022-02-10 RX ADMIN — SODIUM CHLORIDE, PRESERVATIVE FREE 10 ML: 5 INJECTION INTRAVENOUS at 06:00

## 2022-02-10 RX ADMIN — LEVOFLOXACIN 500 MG: 500 INJECTION, SOLUTION INTRAVENOUS at 08:37

## 2022-02-10 RX ADMIN — HYDROMORPHONE HYDROCHLORIDE 4 MG: 4 TABLET ORAL at 15:38

## 2022-02-10 RX ADMIN — ENOXAPARIN SODIUM 40 MG: 100 INJECTION SUBCUTANEOUS at 08:37

## 2022-02-10 NOTE — PROGRESS NOTES
Met with patient at bedside. Her uncle will be coming to get her but not until after he is out of work which will be dinnertime. Told her I could get her a ride home sooner but she declined. Also wanted to speak to the unit manager about her bedside nurse last night that she felt was rude to her. Left message for Saúl Cristobal (covering for [employer] today) asking her to meet with pt asap today. Discharge order noted for today. Orders reviewed. No needs identified at this time.  remains available if needed.   Hemanth Lewis RN - Outcomes Manager  [phone]

## 2022-02-10 NOTE — PROGRESS NOTES
Bedside and Verbal shift change  Received from DAIANA Turner (outgoing nurse), to JEANNETTE Mark (oncoming)  Pt. Is AOX 4. IV SL, Pt. denies  pain at this time. Report included the following information SBAR, Kardex, Procedure Summary, Intake/Output, MAR, Recent Lab Results, . Will resume care and monitor Pt. Condition. Pt. Educated on call bell when in need of help and assistance. Pt. verbalized understanding. 1940  Pt. Head to toe Assessment Done and documented. 2100  Pt made no complaints. 2200  OOB to bathroom. 2300  Pt. Resting comfortably in bed. 0000  Pt. Geovanna pain. 0200  Resting with eyes closed, easily awaken. 0400 Pt. Made no complaints. 0600  Pt. Able to rest and sleep well throughout the whole shift. Verbal and bedside Shift changed report given to Marc Ortiz RN (oncoming RN) on Pt. Condition. Report consisted of patients Situation, History, Activities, intake/output,  Background, Assessment and Recommendations(SBAR). Information from the following report(s) Kardex, order Summary, Lab results and MAR was reviewed with the receiving nurse. Opportunity for questions and clarification was provided.

## 2022-02-10 NOTE — PROGRESS NOTES
Steven Cummings M.D. FACS  PROGRESS NOTE    Name: Jackie Desir MRN: [id number]   : 1995 Hospital: [facility]   Date: 2/10/2022 Admission Date: 2022  5:00 AM     Hospital Day: 5  3 Days Post-Op  Subjective:  Patient with bowel movement and flatus. No nausea vomiting. Bilirubin has decreased overnight. Objective:  Vitals:    22 2018 22 2322 02/10/22 0345 02/10/22 0831   BP: 102/71 104/67 127/89 122/81   Pulse: 85 91 91 100   Resp: 18 18 19 18   Temp: 98.6 °F (37 °C) 97.5 °F (36.4 °C) 98 °F (36.7 °C) 98.2 °F (36.8 °C)   SpO2: 95% 97% 97% 100%   Weight:       Height:         Date 22 0700 - 02/10/22 0659 02/10/22 07 - 22 0659   Shift 8662-2241 7529-8317 24 Hour Total 9920-3748 0104-3294 24 Hour Total   INTAKE   P.O. 240  240 480  480     P. O. 240  240 480  480   Shift Total(mL/kg) 240(2.1)  240(2.1) 480(4.3)  480(4.3)   OUTPUT   Urine(mL/kg/hr)           Urine Occurrence(s) 1 x  1 x 1 x  1 x   Stool           Stool Occurrence(s)    1 x  1 x   Shift Total(mL/kg)           240 480  480   Weight (kg) 111.6 111.6 111.6 111.6 111.6 111.6         Physical Exam:    General: Awake and alert, oriented x4, no apparent distress   Abdomen: abdomen is soft with incisional tenderness. Incision(s) are C/D/I. No   masses, organomegaly or guarding   Extremities: No c/c/e BLE  Labs:  Recent Results (from the past 24 hour(s))   HEPATIC FUNCTION PANEL    Collection Time: 22  9:15 AM   Result Value Ref Range    Protein, total 5.3 (L) 6.4 - 8.2 g/dL    Albumin 2.2 (L) 3.4 - 5.0 g/dL    Globulin 3.1 2.0 - 4.0 g/dL    A-G Ratio 0.7 (L) 0.8 - 1.7      Bilirubin, total 3.0 (H) 0.2 - 1.0 MG/DL    Bilirubin, direct 2.2 (H) 0.0 - 0.2 MG/DL    Alk.  phosphatase 161 (H) 45 - 117 U/L    AST (SGOT) 150 (H) 10 - 38 U/L    ALT (SGPT) 220 (H) 13 - 56 U/L   CBC WITH AUTOMATED DIFF    Collection Time: 22  9:15 AM   Result Value Ref Range    WBC 4.1 (L) 4.6 - 13.2 K/uL    RBC 3.53 (L) 4.20 - 5.30 M/uL    HGB 9.6 (L) 12.0 - 16.0 g/dL    HCT 30.3 (L) 35.0 - 45.0 %    MCV 85.8 78.0 - 100.0 FL    MCH 27.2 24.0 - 34.0 PG    MCHC 31.7 31.0 - 37.0 g/dL    RDW 22.5 (H) 11.6 - 14.5 %    PLATELET 558 693 - 936 K/uL    MPV 8.7 (L) 9.2 - 11.8 FL    NRBC 1.0 (H) 0  WBC    ABSOLUTE NRBC 0.04 (H) 0.00 - 0.01 K/uL    NEUTROPHILS 48 40 - 73 %    LYMPHOCYTES 33 21 - 52 %    MONOCYTES 17 (H) 3 - 10 %    EOSINOPHILS 1 0 - 5 %    BASOPHILS 1 0 - 2 %    IMMATURE GRANULOCYTES 1 (H) 0.0 - 0.5 %    ABS. NEUTROPHILS 2.0 1.8 - 8.0 K/UL    ABS. LYMPHOCYTES 1.4 0.9 - 3.6 K/UL    ABS. MONOCYTES 0.7 0.05 - 1.2 K/UL    ABS. EOSINOPHILS 0.1 0.0 - 0.4 K/UL    ABS. BASOPHILS 0.0 0.0 - 0.1 K/UL    ABS. IMM. GRANS. 0.1 (H) 0.00 - 0.04 K/UL    DF AUTOMATED     METABOLIC PANEL, COMPREHENSIVE    Collection Time: 02/10/22  3:43 AM   Result Value Ref Range    Sodium 140 136 - 145 mmol/L    Potassium 3.1 (L) 3.5 - 5.5 mmol/L    Chloride 105 100 - 111 mmol/L    CO2 29 21 - 32 mmol/L    Anion gap 6 3.0 - 18 mmol/L    Glucose 147 (H) 74 - 99 mg/dL    BUN 3 (L) 7.0 - 18 MG/DL    Creatinine 0.77 0.6 - 1.3 MG/DL    BUN/Creatinine ratio 4 (L) 12 - 20      GFR est AA >60 >60 ml/min/1.73m2    GFR est non-AA >60 >60 ml/min/1.73m2    Calcium 8.5 8.5 - 10.1 MG/DL    Bilirubin, total 1.5 (H) 0.2 - 1.0 MG/DL    ALT (SGPT) 171 (H) 13 - 56 U/L    AST (SGOT) 75 (H) 10 - 38 U/L    Alk.  phosphatase 157 (H) 45 - 117 U/L    Protein, total 5.9 (L) 6.4 - 8.2 g/dL    Albumin 2.2 (L) 3.4 - 5.0 g/dL    Globulin 3.7 2.0 - 4.0 g/dL    A-G Ratio 0.6 (L) 0.8 - 1.7       All Micro Results     Procedure Component Value Units Date/Time    CULTURE, BLOOD [[id number]] Collected: 02/06/22 0610    Order Status: Completed Specimen: Blood Updated: 02/10/22 9136     Special Requests: NO SPECIAL REQUESTS        Culture result: NO GROWTH 4 DAYS       CULTURE, BLOOD [[id number]] Collected: 02/06/22 0620    Order Status: Completed Specimen: Blood Updated: 02/10/22 7308 Special Requests: NO SPECIAL REQUESTS        Culture result: NO GROWTH 4 DAYS       COVID-19 RAPID TEST [[id number]] Collected: 02/06/22 1137    Order Status: Completed Specimen: Nasopharyngeal Updated: 02/06/22 1225     Specimen source Nasopharyngeal        COVID-19 rapid test Not detected        Comment: Rapid Abbott ID Now       Rapid NAAT:  The specimen is NEGATIVE for SARS-CoV-2, the novel coronavirus associated with COVID-19. Negative results should be treated as presumptive and, if inconsistent with clinical signs and symptoms or necessary for patient management, should be tested with an alternative molecular assay. Negative results do not preclude SARS-CoV-2 infection and should not be used as the sole basis for patient management decisions. This test has been authorized by the FDA under an Emergency Use Authorization (EUA) for use by authorized laboratories.    Fact sheet for Healthcare Providers: kstattoo.com  Fact sheet for Patients: kstattoo.com       Methodology: Isothermal Nucleic Acid Amplification               Current Medications:  Current Facility-Administered Medications   Medication Dose Route Frequency Provider Last Rate Last Admin    HYDROmorphone (DILAUDID) tablet 4 mg  4 mg Oral Q4H PRN Dusty Martinez MD   4 mg at 02/10/22 4240    sodium chloride (NS) flush 5-40 mL  5-40 mL IntraVENous Q8H Dusty Martinez MD   10 mL at 02/10/22 0600    sodium chloride (NS) flush 5-40 mL  5-40 mL IntraVENous PRN Dusty Martinez MD        naloxone [facility]) injection 0.4 mg  0.4 mg IntraVENous PRN Dusty Martinez MD        ondansetron [facility]) injection 4 mg  4 mg IntraVENous Q4H PRN Dusty Martinez MD        diphenhydrAMINE (BENADRYL) injection 12.5 mg  12.5 mg IntraVENous Q4H PRN Dusty Martinez MD        bisacodyL (DULCOLAX) suppository 10 mg  10 mg Rectal DAILY PRN Dusty Martinez MD       [facility] LORazepam (ATIVAN) injection 1 mg  1 mg IntraVENous Q6H PRN Eliana García MD        enoxaparin (LOVENOX) injection 40 mg  40 mg SubCUTAneous DAILY Eliana García MD   40 mg at 02/10/22 0837    levoFLOXacin (LEVAQUIN) 500 mg in D5W IVPB  500 mg IntraVENous Q24H Eliana García  mL/hr at 02/10/22 0837 500 mg at 02/10/22 5240       Chart and notes reviewed. Data reviewed. I have evaluated and examined the patient. IMPRESSION:   · Patient with decreasing bilirubin after lap barrie. PLAN:/DISCUSION:   · I spoke with Dr. Jonel Rawls gastroenterology regarding outpatient ERCP. Referral has been made.   · Discharge home  · Follow-up in 2 weeks        Lisa Flood MD

## 2022-02-10 NOTE — DISCHARGE INSTRUCTIONS
Patient Education        Gallbladder Removal Surgery: What to Expect at Home  Your Recovery  After your surgery, you will likely feel weak and tired for several days after you return home. Your belly may be swollen. If you had laparoscopic surgery, you may also have pain in your shoulder for about 24 hours. You may have gas or need to burp a lot at first. A few people get diarrhea. The diarrhea usually goes away in 2 to 4 weeks, but it may last longer. How quickly you recover depends on whether you had a laparoscopic or open surgery. · For a laparoscopic surgery, most people can go back to work or their normal routine in 1 to 2 weeks. But it may take longer, depending on the type of work you do. · For an open surgery, it will probably take 4 to 6 weeks before you get back to your normal routine. This care sheet gives you a general idea about how long it will take for you to recover. However, each person recovers at a different pace. Follow the steps below to get better as quickly as possible. How can you care for yourself at home? Activity    · Rest when you feel tired. Getting enough sleep will help you recover.     · Try to walk each day. Start out by walking a little more than you did the day before. Gradually increase the amount you walk. Walking boosts blood flow and helps prevent pneumonia and constipation.     · For about 2 to 4 weeks, avoid lifting anything that would make you strain. This may include a child, heavy grocery bags and milk containers, a heavy briefcase or backpack, cat litter or dog food bags, or a vacuum .     · Avoid strenuous activities, such as biking, jogging, weightlifting, and aerobic exercise, until your doctor says it is okay.     · You may shower 24 to 48 hours after surgery, if your doctor okays it. Pat the cut (incision) dry.  Do not take a bath for the first 2 weeks, or until your doctor tells you it is okay.     · You may drive when you are no longer taking pain medicine and can quickly move your foot from the gas pedal to the brake. You must also be able to sit comfortably for a long period of time, even if you do not plan to go far. You might get caught in traffic.     · For a laparoscopic surgery, most people can go back to work or their normal routine in 1 to 2 weeks, but it may take longer. For an open surgery, it will probably take 4 to 6 weeks before you get back to your normal routine.     · Your doctor will tell you when you can have sex again. Diet    · When you feel like eating, start with small amounts of food. Avoid eating fatty foods for about 1 month. Fatty foods include hamburger, whole milk, cheese, and many snack foods.     · Drink plenty of fluids (unless your doctor tells you not to).   · If you have diarrhea, watch to see if specific foods cause it, and stop eating them. If the diarrhea continues for more than 2 weeks, talk to your doctor.     · You may notice that your bowel movements are not regular right after your surgery. This is common. Try to avoid constipation and straining with bowel movements. You may want to take a fiber supplement every day. If you have not had a bowel movement after a couple of days, ask your doctor about taking a mild laxative. Medicines    · Your doctor will tell you if and when you can restart your medicines. He or she will also give you instructions about taking any new medicines.     · If you take aspirin or some other blood thinner, ask your doctor if and when to start taking it again. Make sure that you understand exactly what your doctor wants you to do.     · Take pain medicines exactly as directed. ? If the doctor gave you a prescription medicine for pain, take it as prescribed. ? If you are not taking a prescription pain medicine, take an over-the-counter medicine such as acetaminophen (Tylenol), ibuprofen (Advil, Motrin), or naproxen (Aleve). Read and follow all instructions on the label.   ? Do not take two or more pain medicines at the same time unless the doctor told you to. Many pain medicines contain acetaminophen, which is Tylenol. Too much Tylenol can be harmful.     · If you think your pain medicine is making you sick to your stomach:  ? Take your medicine after meals (unless your doctor tells you not to). ? Ask your doctor for a different pain medicine.     · If your doctor prescribed antibiotics, take them as directed. Do not stop taking them just because you feel better. You need to take the full course of antibiotics. Incision care    · If you have strips of tape on the incision, or cut, leave the tape on for a week or until it falls off.     · After 24 to 48 hours, wash the area daily with warm, soapy water, and pat it dry.     · You may have staples to hold the cut together. Keep them dry until your doctor takes them out. This is usually in 7 to 10 days.     · Keep the area clean and dry. You may cover it with a gauze bandage if it weeps or rubs against clothing. Change the bandage every day. Ice    · To reduce swelling and pain, put ice or a cold pack on your belly for 10 to 20 minutes at a time. Do this every 1 to 2 hours. Put a thin cloth between the ice and your skin. Follow-up care is a key part of your treatment and safety. Be sure to make and go to all appointments, and call your doctor if you are having problems. It's also a good idea to know your test results and keep a list of the medicines you take. When should you call for help? Call 911 anytime you think you may need emergency care. For example, call if:    · You passed out (lost consciousness).     · You are short of breath. .   Call your doctor now or seek immediate medical care if:    · You are sick to your stomach and cannot drink fluids.     · You have pain that does not get better when you take your pain medicine.     · You cannot pass stools or gas.     · You have signs of infection, such as:  ? Increased pain, swelling, warmth, or redness. ? Red streaks leading from the incision. ? Pus draining from the incision. ? A fever.     · Bright red blood has soaked through the bandage over your incision.     · You have loose stitches, or your incision comes open.     · You have signs of a blood clot in your leg (called a deep vein thrombosis), such as:  ? Pain in your calf, back of knee, thigh, or groin. ? Redness and swelling in your leg or groin. Watch closely for any changes in your health, and be sure to contact your doctor if you have any problems. Where can you learn more? Go to http://www.gray.com/  Enter F357 in the search box to learn more about \"Gallbladder Removal Surgery: What to Expect at Home. \"  Current as of: February 10, 2021               Content Version: 13.0  © 6679-5858 [employer]. Care instructions adapted under license by [employer] (which disclaims liability or warranty for this information). If you have questions about a medical condition or this instruction, always ask your healthcare professional. [place] [address] any warranty or liability for your use of this information. [place] [address] [facility]  Patricio Kelly MD, FACS  General Surgery    P    DISCHARGE SUMMARY from Nurse    PATIENT INSTRUCTIONS:    After general anesthesia or intravenous sedation, for 24 hours or while taking prescription Narcotics:  · Limit your activities  · Do not drive and operate hazardous machinery  · Do not make important personal or business decisions  · Do  not drink alcoholic beverages  · If you have not urinated within 8 hours after discharge, please contact your surgeon on call.     Report the following to your surgeon:  · Excessive pain, swelling, redness or odor of or around the surgical area  · Temperature over 100.5  · Nausea and vomiting lasting longer than 4 hours or if unable to take medications  · Any signs of decreased circulation or nerve impairment to extremity: change in color, persistent  numbness, tingling, coldness or increase pain  · Any questions    What to do at Home:  Recommended activity: Activity as tolerated, as tolerated, no heavy lifting, no driving    If you experience any of the following symptoms elevated temperature notify doctor  Patient {[id number]}      *  Please give a list of your current medications to your Primary Care Provider. *  Please update this list whenever your medications are discontinued, doses are      changed, or new medications (including over-the-counter products) are added. *  Please carry medication information at all times in case of emergency situations. These are general instructions for a healthy lifestyle:    No smoking/ No tobacco products/ Avoid exposure to second hand smoke  Surgeon General's Warning:  Quitting smoking now greatly reduces serious risk to your health. Obesity, smoking, and sedentary lifestyle greatly increases your risk for illness    A healthy diet, regular physical exercise & weight monitoring are important for maintaining a healthy lifestyle    You may be retaining fluid if you have a history of heart failure or if you experience any of the following symptoms:  Weight gain of 3 pounds or more overnight or 5 pounds in a week, increased swelling in our hands or feet or shortness of breath while lying flat in bed. Please call your doctor as soon as you notice any of these symptoms; do not wait until your next office visit. The discharge information has been reviewed with the patient. The patient verbalized understanding. Discharge medications reviewed with the patient and appropriate educational materials and side effects teaching were provided. ___________________________________________________________________________________________________________________________________t may remove the dressing and shower in two days.   Please use your wash clothe to wash the incision with soap and water once the dressings have been removed  No driving or operating heavy machinery while on narcotic pain medications. Please apply an ice pack to the operative site for 30 minutes 3 times daily to help reduce pain and swelling and the need for narcotic pain medication. No strenuous activity or contact sports for two weeks. No lifting greater than 15 lbs for 2 weeks. Call MD for any redness, swelling, bleeding or pus at the incision. Also call for any nausea, vomiting, increased pain or pain uncontrolled by pain medicine. Low-Fat Diet for Gallbladder Disease: After Your Visit  Your Care Instructions  When you eat, the gallbladder releases bile, which helps you digest the fat in food. If you have an inflamed gallbladder, this may cause pain. A low-fat diet may give your gallbladder a rest so you can start to heal. Your doctor and dietitian can help you make an eating plan that does not irritate your digestive system. Always talk with your doctor or dietitian before you make changes in your diet. Follow-up care is a key part of your treatment and safety. Be sure to make and go to all appointments, and call your doctor if you are having problems. Its also a good idea to know your test results and keep a list of the medicines you take. How can you care for yourself at home? · Eat many small meals and snacks each day instead of three large meals. · Choose lean meats. ¨ Eat no more than 5 to 6½ ounces of meat a day. ¨ Cut off all fat you can see. ¨ Eat chicken and turkey without the skin. ¨ Many types of fish, such as salmon, lake trout, tuna, and herring, provide healthy omega-3 fat. But, avoid fish canned in oil, such as sardines in olive oil. ¨ Bake, broil, or grill meats, fowl, or fish instead of frying them in butter or fat. · Drink or eat nonfat or low-fat milk, yogurt, cheese, or other milk products each day.   ¨ Read the labels on cheeses, and choose those with less than 5 grams of fat an ounce. ¨ Try fat-free sour cream, cream cheese, or yogurt. ¨ Avoid cream soups and cream sauces on pasta. ¨ Eat low-fat ice cream, frozen yogurt, or sorbet. Avoid regular ice cream.  · Eat whole-grain cereals, breads, crackers, rice, or pasta. Avoid high-fat foods such as croissants, scones, biscuits, waffles, doughnuts, muffins, granola, and high-fat breads. · Flavor your foods with herbs and spices (such as basil, tarragon, or mint), fat-free sauces, or lemon juice instead of butter. You can also use butter substitutes, fat-free mayonnaise, or fat-free dressing. · Try applesauce, prune puree, or mashed bananas to replace some or all of the fat when you bake. · Limit fats and oils, such as butter, margarine, mayonnaise, and salad dressing, to no more than 1 tablespoon a meal.  · Avoid high-fat foods, such as:  ¨ Chocolate, whole milk, ice cream, processed cheese, and egg yolks. ¨ Fried or buttered foods. ¨ Ham, salami, and cabrera. ¨ Cinnamon rolls, cakes, pies, cookies, and other pastries. ¨ Prepared snack foods, such as potato chips, nut and granola bars, and mixed nuts. ¨ Coconut and avocado. · Learn how to read food labels for serving sizes and ingredients. Fast-food and convenience-food meals often have lots of fat. Where can you learn more? Go to [employer]  Enter C943 in the search box to learn more about \"Low-Fat Diet for Gallbladder Disease: After Your Visit. \"   © 9086-9049 [employer]. Care instructions adapted under license by [facility] (which disclaims liability or warranty for this information). This care instruction is for use with your licensed healthcare professional. If you have questions about a medical condition or this instruction, always ask your healthcare professional. [place] [address] any warranty or liability for your use of this information.   Content Version: 6.0.186998; Last Revised: August 31, 2011  Patient Education        High-Fiber Diet: Care Instructions  Overview     A high-fiber diet may help you relieve constipation and feel less bloated. Your doctor and dietitian will help you make a high-fiber eating plan based on your personal needs. The plan will include the things you like to eat. It will also make sure that you get 25 to 35 grams of fiber a day. Before you make changes to the way you eat, be sure to talk with your doctor or dietitian. Follow-up care is a key part of your treatment and safety. Be sure to make and go to all appointments, and call your doctor if you are having problems. It's also a good idea to know your test results and keep a list of the medicines you take. How can you care for yourself at home? · You can increase how much fiber you get if you eat more of certain foods. These foods include:  ? Whole-grain breads and cereals. ? Fruits, such as pears, apples, and peaches. Eat the skins and peels if you can.  ? Vegetables, such as broccoli, cabbage, spinach, carrots, asparagus, and squash. ? Starchy vegetables. These include potatoes with skins, kidney beans, and lima beans. · Take a fiber supplement every day if your doctor recommends it. Examples are Benefiber, Citrucel, FiberCon, and Metamucil. Ask your doctor how much to take. · Drink plenty of fluids. If you have kidney, heart, or liver disease and have to limit fluids, talk with your doctor before you increase the amount of fluids you drink. Where can you learn more? Go to http://www.gray.com/  Enter W564 in the search box to learn more about \"High-Fiber Diet: Care Instructions. \"  Current as of: December 17, 2020               Content Version: 13.0  © 8589-0169 [employer]. Care instructions adapted under license by [employer] (which disclaims liability or warranty for this information).  If you have questions about a medical condition or this instruction, always ask your healthcare professional. [place] [address] any warranty or liability for your use of this information. Patient Education        Gallbladder Removal Surgery: What to Expect at Home  Your Recovery  After your surgery, you will likely feel weak and tired for several days after you return home. Your belly may be swollen. If you had laparoscopic surgery, you may also have pain in your shoulder for about 24 hours. You may have gas or need to burp a lot at first. A few people get diarrhea. The diarrhea usually goes away in 2 to 4 weeks, but it may last longer. How quickly you recover depends on whether you had a laparoscopic or open surgery. · For a laparoscopic surgery, most people can go back to work or their normal routine in 1 to 2 weeks. But it may take longer, depending on the type of work you do. · For an open surgery, it will probably take 4 to 6 weeks before you get back to your normal routine. This care sheet gives you a general idea about how long it will take for you to recover. However, each person recovers at a different pace. Follow the steps below to get better as quickly as possible. How can you care for yourself at home? Activity    · Rest when you feel tired. Getting enough sleep will help you recover.     · Try to walk each day. Start out by walking a little more than you did the day before. Gradually increase the amount you walk. Walking boosts blood flow and helps prevent pneumonia and constipation.     · For about 2 to 4 weeks, avoid lifting anything that would make you strain. This may include a child, heavy grocery bags and milk containers, a heavy briefcase or backpack, cat litter or dog food bags, or a vacuum .     · Avoid strenuous activities, such as biking, jogging, weightlifting, and aerobic exercise, until your doctor says it is okay.     · You may shower 24 to 48 hours after surgery, if your doctor okays it.  Pat the cut (incision) dry. Do not take a bath for the first 2 weeks, or until your doctor tells you it is okay.     · You may drive when you are no longer taking pain medicine and can quickly move your foot from the gas pedal to the brake. You must also be able to sit comfortably for a long period of time, even if you do not plan to go far. You might get caught in traffic.     · For a laparoscopic surgery, most people can go back to work or their normal routine in 1 to 2 weeks, but it may take longer. For an open surgery, it will probably take 4 to 6 weeks before you get back to your normal routine.     · Your doctor will tell you when you can have sex again. Diet    · When you feel like eating, start with small amounts of food. Avoid eating fatty foods for about 1 month. Fatty foods include hamburger, whole milk, cheese, and many snack foods.     · Drink plenty of fluids (unless your doctor tells you not to).   · If you have diarrhea, watch to see if specific foods cause it, and stop eating them. If the diarrhea continues for more than 2 weeks, talk to your doctor.     · You may notice that your bowel movements are not regular right after your surgery. This is common. Try to avoid constipation and straining with bowel movements. You may want to take a fiber supplement every day. If you have not had a bowel movement after a couple of days, ask your doctor about taking a mild laxative. Medicines    · Your doctor will tell you if and when you can restart your medicines. He or she will also give you instructions about taking any new medicines.     · If you take aspirin or some other blood thinner, ask your doctor if and when to start taking it again. Make sure that you understand exactly what your doctor wants you to do.     · Take pain medicines exactly as directed. ? If the doctor gave you a prescription medicine for pain, take it as prescribed.   ? If you are not taking a prescription pain medicine, take an over-the-counter medicine such as acetaminophen (Tylenol), ibuprofen (Advil, Motrin), or naproxen (Aleve). Read and follow all instructions on the label. ? Do not take two or more pain medicines at the same time unless the doctor told you to. Many pain medicines contain acetaminophen, which is Tylenol. Too much Tylenol can be harmful.     · If you think your pain medicine is making you sick to your stomach:  ? Take your medicine after meals (unless your doctor tells you not to). ? Ask your doctor for a different pain medicine.     · If your doctor prescribed antibiotics, take them as directed. Do not stop taking them just because you feel better. You need to take the full course of antibiotics. Incision care    · If you have strips of tape on the incision, or cut, leave the tape on for a week or until it falls off.     · After 24 to 48 hours, wash the area daily with warm, soapy water, and pat it dry.     · You may have staples to hold the cut together. Keep them dry until your doctor takes them out. This is usually in 7 to 10 days.     · Keep the area clean and dry. You may cover it with a gauze bandage if it weeps or rubs against clothing. Change the bandage every day. Ice    · To reduce swelling and pain, put ice or a cold pack on your belly for 10 to 20 minutes at a time. Do this every 1 to 2 hours. Put a thin cloth between the ice and your skin. Follow-up care is a key part of your treatment and safety. Be sure to make and go to all appointments, and call your doctor if you are having problems. It's also a good idea to know your test results and keep a list of the medicines you take. When should you call for help? Call 911 anytime you think you may need emergency care. For example, call if:    · You passed out (lost consciousness).     · You are short of breath. .   Call your doctor now or seek immediate medical care if:    · You are sick to your stomach and cannot drink fluids.     · You have pain that does not get better when you take your pain medicine.     · You cannot pass stools or gas.     · You have signs of infection, such as:  ? Increased pain, swelling, warmth, or redness. ? Red streaks leading from the incision. ? Pus draining from the incision. ? A fever.     · Bright red blood has soaked through the bandage over your incision.     · You have loose stitches, or your incision comes open.     · You have signs of a blood clot in your leg (called a deep vein thrombosis), such as:  ? Pain in your calf, back of knee, thigh, or groin. ? Redness and swelling in your leg or groin. Watch closely for any changes in your health, and be sure to contact your doctor if you have any problems. Where can you learn more? Go to http://www.gray.com/  Enter F357 in the search box to learn more about \"Gallbladder Removal Surgery: What to Expect at Home. \"  Current as of: February 10, 2021               Content Version: 13.0  © 2006-2021 [employer]. Care instructions adapted under license by [employer] (which disclaims liability or warranty for this information). If you have questions about a medical condition or this instruction, always ask your healthcare professional. [place] [address] any warranty or liability for your use of this information.

## 2022-02-10 NOTE — DISCHARGE SUMMARY
[facility]  Beltran Eason MD, [facility]  General Surgery  Discharge Summary     Patient ID:  Jackie Desir  [id number]  31 y.o.  1995    Admit Date: 2/6/2022    Discharge Date: 2/10/2022    Admission Diagnoses: Cholecystitis [K81.9]    Discharge Diagnoses:    Problem List as of 2/10/2022 Date Reviewed: 9/15/2021          Codes Class Noted - Resolved    Cholecystitis ICD-10-CM: K81.9  ICD-9-CM: 575.10  2/6/2022 - Present        Breast cancer ([facility] [address] ICD-10-CM: C50.919  ICD-9-CM: 174.9  8/26/2021 - Present        Felon of finger of right hand ICD-10-CM: L03.011  ICD-9-CM: 681.01  7/5/2021 - Present        Cellulitis and abscess of hand ICD-10-CM: L03.119, L02.519  ICD-9-CM: 682.4  7/5/2021 - Present        Pregnancy ICD-10-CM: Z34.90  ICD-9-CM: V22.2  7/30/2016 - Present               Admission Condition: Good    Discharge Condition: Good    Last Procedure: Procedure(s):  [address] Course:   Hospital course was complicated by hyperbilirubinemia, which added 2 days to the patient's length of stay. Consults: Gastroenterologists    Significant Diagnostic Studies: None    Disposition: home    Patient Instructions:   Current Discharge Medication List      START taking these medications    Details   HYDROmorphone (DILAUDID) 2 mg tablet Take 1 Tablet by mouth every six (6) hours as needed for Pain for up to 3 days. Max Daily Amount: 8 mg. Qty: 12 Tablet, Refills: 0    Associated Diagnoses: Postoperative pain      ibuprofen (MOTRIN) 600 mg tablet Take 1 Tablet by mouth every six (6) hours. Qty: 28 Tablet, Refills: 0      docusate sodium (COLACE) 100 mg capsule Take 1 Capsule by mouth two (2) times a day for 90 days. Qty: 60 Capsule, Refills: 2      bisacodyL 5 mg tab Take 5 mg by mouth daily as needed for PRN Reason (Other) (Constipation).   Qty: 3 Tablet, Refills: 0         CONTINUE these medications which have NOT CHANGED    Details OXYBUTYNIN CHLORIDE PO Take 5 mg by mouth daily. albuterol (PROVENTIL HFA, VENTOLIN HFA, PROAIR HFA) 90 mcg/actuation inhaler Take 1 Puff by inhalation every four (4) hours as needed for Wheezing. Qty: 1 Inhaler, Refills: 0           Activity: See surgical instructions  Diet: Low fat, Low cholesterol  Wound Care: As directed    Follow-up with Dr. Johnie Julien in 2 weeks.   Follow-up tests/labs None    Signed:  Cyril Shahid MD  2/10/2022  9:07 AM

## 2022-02-10 NOTE — PROGRESS NOTES
Problem: Falls - Risk of  Goal: *Absence of Falls  Description: Document Britton Blades Fall Risk and appropriate interventions in the flowsheet.   Outcome: Progressing Towards Goal  Note: Fall Risk Interventions:            Medication Interventions: Bed/chair exit alarm    Elimination Interventions: Call light in reach              Problem: Patient Education: Go to Patient Education Activity  Goal: Patient/Family Education  Outcome: Progressing Towards Goal     Problem: Pain  Goal: *Control of Pain  Outcome: Progressing Towards Goal

## 2022-02-12 LAB
BACTERIA SPEC CULT: NORMAL
BACTERIA SPEC CULT: NORMAL
SERVICE CMNT-IMP: NORMAL
SERVICE CMNT-IMP: NORMAL

## 2022-02-16 ENCOUNTER — APPOINTMENT (OUTPATIENT)
Dept: CT IMAGING | Age: 27
End: 2022-02-16
Attending: PHYSICIAN ASSISTANT
Payer: MEDICAID

## 2022-02-16 ENCOUNTER — HOSPITAL ENCOUNTER (EMERGENCY)
Age: 27
Discharge: HOME | End: 2022-02-17
Attending: STUDENT IN AN ORGANIZED HEALTH CARE EDUCATION/TRAINING PROGRAM
Payer: MEDICAID

## 2022-02-16 VITALS
RESPIRATION RATE: 18 BRPM | HEART RATE: 81 BPM | BODY MASS INDEX: 46.92 KG/M2 | DIASTOLIC BLOOD PRESSURE: 79 MMHG | SYSTOLIC BLOOD PRESSURE: 118 MMHG | WEIGHT: 239 LBS | OXYGEN SATURATION: 100 % | TEMPERATURE: 97.9 F | HEIGHT: 60 IN

## 2022-02-16 DIAGNOSIS — R10.30 LOWER ABDOMINAL PAIN: ICD-10-CM
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
## 2022-02-16 LAB
ALBUMIN SERPL-MCNC: 2.7 G/DL (ref 3.4–5)
ALBUMIN/GLOB SERPL: 0.6 {RATIO} (ref 0.8–1.7)
ALP SERPL-CCNC: 120 U/L (ref 45–117)
ALT SERPL-CCNC: 64 U/L (ref 13–56)
ANION GAP SERPL CALC-SCNC: 5 MMOL/L (ref 3–18)
AST SERPL-CCNC: 73 U/L (ref 10–38)
BASOPHILS # BLD: 0.1 K/UL (ref 0–0.1)
BASOPHILS NFR BLD: 1 % (ref 0–2)
BILIRUB SERPL-MCNC: 1 MG/DL (ref 0.2–1)
BUN SERPL-MCNC: 5 MG/DL (ref 7–18)
BUN/CREAT SERPL: 6 (ref 12–20)
CALCIUM SERPL-MCNC: 9 MG/DL (ref 8.5–10.1)
CHLORIDE SERPL-SCNC: 105 MMOL/L (ref 100–111)
CO2 SERPL-SCNC: 27 MMOL/L (ref 21–32)
CREAT SERPL-MCNC: 0.87 MG/DL (ref 0.6–1.3)
DIFFERENTIAL METHOD BLD: ABNORMAL
EOSINOPHIL # BLD: 0 K/UL (ref 0–0.4)
EOSINOPHIL NFR BLD: 0 % (ref 0–5)
ERYTHROCYTE [DISTWIDTH] IN BLOOD BY AUTOMATED COUNT: 22.6 % (ref 11.6–14.5)
GLOBULIN SER CALC-MCNC: 4.4 G/DL (ref 2–4)
GLUCOSE SERPL-MCNC: 194 MG/DL (ref 74–99)
HCT VFR BLD AUTO: 33.9 % (ref 35–45)
HGB BLD-MCNC: 10.1 G/DL (ref 12–16)
IMM GRANULOCYTES # BLD AUTO: 0.1 K/UL (ref 0–0.04)
IMM GRANULOCYTES NFR BLD AUTO: 1 % (ref 0–0.5)
LYMPHOCYTES # BLD: 1.9 K/UL (ref 0.9–3.6)
LYMPHOCYTES NFR BLD: 28 % (ref 21–52)
MCH RBC QN AUTO: 26.6 PG (ref 24–34)
MCHC RBC AUTO-ENTMCNC: 29.8 G/DL (ref 31–37)
MCV RBC AUTO: 89.4 FL (ref 78–100)
MONOCYTES # BLD: 0.5 K/UL (ref 0.05–1.2)
MONOCYTES NFR BLD: 8 % (ref 3–10)
NEUTS SEG # BLD: 4.1 K/UL (ref 1.8–8)
NEUTS SEG NFR BLD: 62 % (ref 40–73)
NRBC # BLD: 0 K/UL (ref 0–0.01)
NRBC BLD-RTO: 0 PER 100 WBC
PLATELET # BLD AUTO: ABNORMAL K/UL (ref 135–420)
PMV BLD AUTO: 11.4 FL (ref 9.2–11.8)
POTASSIUM SERPL-SCNC: 4.7 MMOL/L (ref 3.5–5.5)
PROT SERPL-MCNC: 7.1 G/DL (ref 6.4–8.2)
RBC # BLD AUTO: 3.79 M/UL (ref 4.2–5.3)
RBC MORPH BLD: ABNORMAL
SODIUM SERPL-SCNC: 137 MMOL/L (ref 136–145)
WBC # BLD AUTO: 6.7 K/UL (ref 4.6–13.2)

## 2022-02-16 PROCEDURE — 74011000636 HC RX REV CODE- 636: Performed by: STUDENT IN AN ORGANIZED HEALTH CARE EDUCATION/TRAINING PROGRAM

## 2022-02-16 PROCEDURE — 96374 THER/PROPH/DIAG INJ IV PUSH: CPT

## 2022-02-16 PROCEDURE — 74011250637 HC RX REV CODE- 250/637: Performed by: PHYSICIAN ASSISTANT

## 2022-02-16 PROCEDURE — 74011250636 HC RX REV CODE- 250/636: Performed by: PHYSICIAN ASSISTANT

## 2022-02-16 PROCEDURE — 74177 CT ABD & PELVIS W/CONTRAST: CPT

## 2022-02-16 PROCEDURE — 96376 TX/PRO/DX INJ SAME DRUG ADON: CPT

## 2022-02-16 PROCEDURE — 99283 EMERGENCY DEPT VISIT LOW MDM: CPT

## 2022-02-16 PROCEDURE — 85025 COMPLETE CBC W/AUTO DIFF WBC: CPT

## 2022-02-16 PROCEDURE — 80053 COMPREHEN METABOLIC PANEL: CPT

## 2022-02-16 RX ORDER — TRAMADOL HYDROCHLORIDE 50 MG/1
50 TABLET ORAL
Qty: 10 TABLET | Refills: 0 | Status: SHIPPED | OUTPATIENT
Start: 2022-02-16 | End: 2022-02-20

## 2022-02-16 RX ORDER — DIPHENHYDRAMINE HCL 25 MG
25 CAPSULE ORAL
Status: COMPLETED | OUTPATIENT
Start: 2022-02-16 | End: 2022-02-16

## 2022-02-16 RX ORDER — MORPHINE SULFATE 4 MG/ML
4 INJECTION INTRAVENOUS
Status: COMPLETED | OUTPATIENT
Start: 2022-02-16 | End: 2022-02-16

## 2022-02-16 RX ADMIN — MORPHINE SULFATE 4 MG: 4 INJECTION, SOLUTION INTRAMUSCULAR; INTRAVENOUS at 18:19

## 2022-02-16 RX ADMIN — MORPHINE SULFATE 4 MG: 4 INJECTION, SOLUTION INTRAMUSCULAR; INTRAVENOUS at 20:23

## 2022-02-16 RX ADMIN — DIPHENHYDRAMINE HYDROCHLORIDE 25 MG: 25 CAPSULE ORAL at 18:19

## 2022-02-16 RX ADMIN — IOPAMIDOL 100 ML: 612 INJECTION, SOLUTION INTRAVENOUS at 19:03

## 2022-02-16 NOTE — ED TRIAGE NOTES
Pt endorses burning abd pain at her incision site where the gallbladder was removed. Pt fell yesterday on the way to the [place] which is when the pain started.

## 2022-02-16 NOTE — ED PROVIDER NOTES
EMERGENCY DEPARTMENT HISTORY AND PHYSICAL EXAM      Date: 2/16/2022  Patient Name: Jesse Lozoya    History of Presenting Illness     Chief Complaint   Patient presents with    Abdominal Pain       History Provided By: Patient    HPI: Jesse Lozoya, 31 y.o. female PMHx significant for asthma, breast cancer, presents ambulatory to the ED. Pt reports constant burning to RLQ after falling yesterday. Patient reports she is status post cholecystectomy that was performed on 2/7, and surgical scars are still healing. Denies vomiting, diarrhea, fever/chills. Patient a plastic surgery operation scheduled for today was told to come here instead. Patient has not taken anything for symptoms. Patient also reports developing a rash that is still present after she was given dilaudid while inpatient. There are no other complaints, changes, or physical findings at this time.     PCP: Chris Wolff MD    Current Facility-Administered Medications on File Prior to Encounter   Medication Dose Route Frequency Provider Last Rate Last Admin    [COMPLETED] glycopyrrolate (ROBINUL) injection 0.2 mg  0.2 mg IntraVENous ONCE Geovanny Singh DO   0.2 mg at 02/16/22 1228    [DISCONTINUED] lactated Ringers infusion  50 mL/hr IntraVENous CONTINUOUS Margaretzanna Lustbalaji D, DO 50 mL/hr at 02/16/22 1228 50 mL/hr at 02/16/22 1228    [DISCONTINUED] dexamethasone (DECADRON) 10 mg/mL injection 10 mg  10 mg IntraVENous ONCE Geovanny Singh DO        [DISCONTINUED] flumazeniL (ROMAZICON) 0.1 mg/mL injection 0.2 mg  0.2 mg IntraVENous PRN Geovanny Singh DO        [DISCONTINUED] naloxone [facility]) injection 0.1 mg  0.1 mg IntraVENous PRN Geovanny Singh DO        [DISCONTINUED] celecoxib (CELEBREX) capsule 400 mg  400 mg Oral ONCE Geovanny Singh DO         Current Outpatient Medications on File Prior to Encounter   Medication Sig Dispense Refill    HYDROmorphone (Dilaudid) 2 mg tablet Take 2 mg by mouth every six (6) hours as needed for Pain.  ibuprofen (MOTRIN) 600 mg tablet Take 1 Tablet by mouth every six (6) hours. 28 Tablet 0    OXYBUTYNIN CHLORIDE PO Take 5 mg by mouth daily.  albuterol (PROVENTIL HFA, VENTOLIN HFA, PROAIR HFA) 90 mcg/actuation inhaler Take 1 Puff by inhalation every four (4) hours as needed for Wheezing. 1 Inhaler 0       Past History     Past Medical History:  Past Medical History:   Diagnosis Date    Asthma     last attack years ago    Cancer [facility])     BREAST    Chronic pain of left thumb        Past Surgical History:  Past Surgical History:   Procedure Laterality Date    HAND/FINGER SURGERY UNLISTED      left thumb    HX BREAST BIOPSY      HX BREAST RECONSTRUCTION Bilateral 8/26/2021    BILATERAL BREAST RECONSTRUCTION WITH TISSUE EXPANDERS & ADIPOFASCIAL FLAPS performed by Leyla Kramer MD at [facility] OR     [address]  2016    HX LAP CHOLECYSTECTOMY      HX MASTECTOMY Bilateral 8/26/2021    BILATERAL SKIN SPARING MASTECTOMY; LEFT SENTINEL NODE BIOPSY performed by Antony Villatoro MD at [facility] OR       Family History:  Family History   Problem Relation Age of Onset    Diabetes Mother     No Known Problems Father        Social History:  Social History     Tobacco Use    Smoking status: Never Smoker    Smokeless tobacco: Never Used   Substance Use Topics    Alcohol use: Yes     Comment: OCASSIONAL    Drug use: Never       Allergies: Allergies   Allergen Reactions    Percocet [Oxycodone-Acetaminophen] Swelling     Pt can not remember if her mom told her  percocet or penicillin allergy so she does not take either. patient reports tylenol makes her eyes swell. States she is not allergic to Percocet. She has never taken it.  Tylenol [Acetaminophen] Anaphylaxis    Penicillins Other (comments)     Pt stated had allergy as a child as per her mother.  Gadavist [Gadobutrol] Nausea Only and Other (comments)     CO NAUSEA AND HEADACHE POST INJECTION. Review of Systems   Review of Systems   Constitutional: Negative for chills and fever. Respiratory: Negative for shortness of breath. Cardiovascular: Negative for chest pain. Gastrointestinal: Positive for abdominal pain. Negative for nausea and vomiting. Genitourinary: Negative for flank pain. Musculoskeletal: Negative for back pain and myalgias. Skin: Negative for color change, pallor, rash and wound. Neurological: Negative for dizziness, weakness and light-headedness. All other systems reviewed and are negative. Physical Exam   Physical Exam  Vitals and nursing note reviewed. Constitutional:       General: She is not in acute distress. Appearance: She is well-developed. Comments: Pt in NAD   HENT:      Head: Normocephalic and atraumatic. Eyes:      Conjunctiva/sclera: Conjunctivae normal.   Cardiovascular:      Rate and Rhythm: Normal rate and regular rhythm. Heart sounds: Normal heart sounds. Pulmonary:      Effort: Pulmonary effort is normal. No respiratory distress. Breath sounds: Normal breath sounds. Abdominal:      General: Bowel sounds are normal. There is no distension. Palpations: Abdomen is soft. Musculoskeletal:         General: Normal range of motion. Skin:     General: Skin is warm. Findings: No rash. Neurological:      Mental Status: She is alert and oriented to person, place, and time.    Psychiatric:         Behavior: Behavior normal.         Diagnostic Study Results     Labs -     Recent Results (from the past 12 hour(s))   POC HCG,URINE    Collection Time: 02/16/22 11:56 AM   Result Value Ref Range    HCG urine, QL negative NEGATIVE,Negative,negative     CBC WITH AUTOMATED DIFF    Collection Time: 02/16/22  5:25 PM   Result Value Ref Range    WBC 6.7 4.6 - 13.2 K/uL    RBC 3.79 (L) 4.20 - 5.30 M/uL    HGB 10.1 (L) 12.0 - 16.0 g/dL    HCT 33.9 (L) 35.0 - 45.0 %    MCV 89.4 78.0 - 100.0 FL    MCH 26.6 24.0 - 34.0 PG MCHC 29.8 (L) 31.0 - 37.0 g/dL    RDW 22.6 (H) 11.6 - 14.5 %    PLATELET  015 - 948 K/uL     UNABLE TO REPORT ACCURATE COUNT DUE TO PLATELET AGGREGATION, HOWEVER, PLATELETS APPEAR NORMAL IN NUMBER ON SMEAR. PLEASE RESUBMIT SODIUM CITRATE (BLUE) AND EDTA (LAVENDER) TUBES FOR HEMATOLOGICAL TESTING. MPV 11.4 9.2 - 11.8 FL    NRBC 0.0 0  WBC    ABSOLUTE NRBC 0.00 0.00 - 0.01 K/uL    NEUTROPHILS 62 40 - 73 %    LYMPHOCYTES 28 21 - 52 %    MONOCYTES 8 3 - 10 %    EOSINOPHILS 0 0 - 5 %    BASOPHILS 1 0 - 2 %    IMMATURE GRANULOCYTES 1 (H) 0.0 - 0.5 %    ABS. NEUTROPHILS 4.1 1.8 - 8.0 K/UL    ABS. LYMPHOCYTES 1.9 0.9 - 3.6 K/UL    ABS. MONOCYTES 0.5 0.05 - 1.2 K/UL    ABS. EOSINOPHILS 0.0 0.0 - 0.4 K/UL    ABS. BASOPHILS 0.1 0.0 - 0.1 K/UL    ABS. IMM. GRANS. 0.1 (H) 0.00 - 0.04 K/UL    DF SMEAR SCANNED      RBC COMMENTS MACROCYTOSIS  2+        RBC COMMENTS POLYCHROMASIA  1+        RBC COMMENTS ANISOCYTOSIS  3+        RBC COMMENTS SCHISTOCYTES  1+       METABOLIC PANEL, COMPREHENSIVE    Collection Time: 02/16/22  5:25 PM   Result Value Ref Range    Sodium 137 136 - 145 mmol/L    Potassium 4.7 3.5 - 5.5 mmol/L    Chloride 105 100 - 111 mmol/L    CO2 27 21 - 32 mmol/L    Anion gap 5 3.0 - 18 mmol/L    Glucose 194 (H) 74 - 99 mg/dL    BUN 5 (L) 7.0 - 18 MG/DL    Creatinine 0.87 0.6 - 1.3 MG/DL    BUN/Creatinine ratio 6 (L) 12 - 20      GFR est AA >60 >60 ml/min/1.73m2    GFR est non-AA >60 >60 ml/min/1.73m2    Calcium 9.0 8.5 - 10.1 MG/DL    Bilirubin, total 1.0 0.2 - 1.0 MG/DL    ALT (SGPT) 64 (H) 13 - 56 U/L    AST (SGOT) 73 (H) 10 - 38 U/L    Alk.  phosphatase 120 (H) 45 - 117 U/L    Protein, total 7.1 6.4 - 8.2 g/dL    Albumin 2.7 (L) 3.4 - 5.0 g/dL    Globulin 4.4 (H) 2.0 - 4.0 g/dL    A-G Ratio 0.6 (L) 0.8 - 1.7         Radiologic Studies -   CT ABD PELV W CONT    (Results Pending)     CT Results  (Last 48 hours)    None        CXR Results  (Last 48 hours)    None          Medical Decision Making   I am the first provider for this patient. I reviewed the vital signs, available nursing notes, past medical history, past surgical history, family history and social history. Vital Signs-Reviewed the patient's vital signs. Patient Vitals for the past 12 hrs:   Temp Pulse Resp BP SpO2   02/16/22 1628 97.9 °F (36.6 °C) 81 18 118/79 100 %         Records Reviewed: Nursing Notes and Old Medical Records    Provider Notes (Medical Decision Making):   DDx: Post op pain,     32 yo F who presents with RLQ abd pain after falling yesterday. S/p cholecystectomy on 2/7. ED Course:   Initial assessment performed. The patients presenting problems have been discussed, and they are in agreement with the care plan formulated and outlined with them. I have encouraged them to ask questions as they arise throughout their visit. 1930: Transfer of care to [address] [place] [address] at shift change. Plan: Awaiting CT scan. Attestations:    TRAVIS Singh    Please note that this dictation was completed with [employer], the computer voice recognition software. Quite often unanticipated grammatical, syntax, homophones, and other interpretive errors are inadvertently transcribed by the computer software. Please disregard these errors. Please excuse any errors that have escaped final proofreading. Thank you.

## 2022-02-17 NOTE — ED NOTES
7:00 PM Assumed care of the pt at this time. Discussed with TRAVIS Tanner Cons concerning patient Shahid Neves, standard discussion of reason for visit, HPI, ROS, PE, and current results available. Recommendation for obtaining pending CT followed by bedside re-evaluation to dispo the pt. Lacey Tapia PA-C     7:59 PM CT resulted, expected post surgical changes, no evidence of acute or post traumatic findings. I have seen and re-evaluated the pt at bedside. Discussed the results of today's work-up with the pt. Will plan to d/c with recommendation for tylenol/motrin prn and close follow-up with her surgeon. Return precautions advised. Pt agrees. Lacey Tapia PA-C     Disposition: d/c    Dictation disclaimer:  Please note that this dictation was completed with [employer], the computer voice recognition software. Quite often unanticipated grammatical, syntax, homophones, and other interpretive errors are inadvertently transcribed by the computer software. Please disregard these errors. Please excuse any errors that have escaped final proofreading.

## 2022-02-17 NOTE — DISCHARGE INSTRUCTIONS
[employer] Activation    Thank you for requesting access to "[employer]. Please follow the instructions below to securely access and download your online medical record. "[employer] allows you to send messages to your doctor, view your test results, renew your prescriptions, schedule appointments, and more. How Do I Sign Up? In your internet browser, go to [email or web address]  Click on the First Time User? Click Here link in the Sign In box. You will be redirect to the New Member Sign Up page. Enter your "[employer] Access Code exactly as it appears below. You will not need to use this code after youve completed the sign-up process. If you do not sign up before the expiration date, you must request a new code. "[employer] Access Code: Activation code not generated  Current "[employer] Status: Active (This is the date your "[employer] access code will )    Enter the last four digits of your Social Security Number (xxxx) and Date of Birth (mm/dd/yyyy) as indicated and click Submit. You will be taken to the next sign-up page. Create a "[employer] ID. This will be your "[employer] login ID and cannot be changed, so think of one that is secure and easy to remember. Create a "[employer] password. You can change your password at any time. Enter your Password Reset Question and Answer. This can be used at a later time if you forget your password. Enter your e-mail address. You will receive e-mail notification when new information is available in [address] Click Sign Up. You can now view and download portions of your medical record. Click the [place] link to download a portable copy of your medical information. Additional Information    If you have questions, please visit the Frequently Asked Questions section of the "[employer] website at [email or web address] Remember, "[employer] is NOT to be used for urgent needs. For medical emergencies, dial 911.

## 2022-02-20 ENCOUNTER — ANESTHESIA (OUTPATIENT)
Dept: SURGERY | Age: 27
DRG: 711 | End: 2022-02-20
Payer: MEDICAID

## 2022-02-20 ENCOUNTER — ANESTHESIA EVENT (OUTPATIENT)
Dept: SURGERY | Age: 27
DRG: 711 | End: 2022-02-20
Payer: MEDICAID

## 2022-02-20 ENCOUNTER — APPOINTMENT (OUTPATIENT)
Dept: CT IMAGING | Age: 27
DRG: 711 | End: 2022-02-20
Attending: EMERGENCY MEDICINE
Payer: MEDICAID

## 2022-02-20 ENCOUNTER — APPOINTMENT (OUTPATIENT)
Dept: CT IMAGING | Age: 27
DRG: 711 | End: 2022-02-20
Attending: STUDENT IN AN ORGANIZED HEALTH CARE EDUCATION/TRAINING PROGRAM
Payer: MEDICAID

## 2022-02-20 ENCOUNTER — HOSPITAL ENCOUNTER (INPATIENT)
Age: 27
LOS: 5 days | Discharge: HOME HEALTH | DRG: 711 | End: 2022-02-25
Attending: EMERGENCY MEDICINE | Admitting: FAMILY MEDICINE
Payer: MEDICAID

## 2022-02-20 ENCOUNTER — APPOINTMENT (OUTPATIENT)
Dept: GENERAL RADIOLOGY | Age: 27
DRG: 711 | End: 2022-02-20
Attending: EMERGENCY MEDICINE
Payer: MEDICAID

## 2022-02-20 DIAGNOSIS — R00.0 SINUS TACHYCARDIA: ICD-10-CM

## 2022-02-20 DIAGNOSIS — Z17.0 MALIGNANT NEOPLASM OF LEFT BREAST IN FEMALE, ESTROGEN RECEPTOR POSITIVE, UNSPECIFIED SITE OF BREAST (HCC): ICD-10-CM

## 2022-02-20 DIAGNOSIS — N61.1 ABSCESS OF LEFT BREAST: ICD-10-CM

## 2022-02-20 DIAGNOSIS — S20.112A BREAST ABRASION, LEFT, INITIAL ENCOUNTER: ICD-10-CM

## 2022-02-20 DIAGNOSIS — D72.829 LEUKOCYTOSIS, UNSPECIFIED TYPE: ICD-10-CM

## 2022-02-20 DIAGNOSIS — C50.912 MALIGNANT NEOPLASM OF LEFT BREAST IN FEMALE, ESTROGEN RECEPTOR POSITIVE, UNSPECIFIED SITE OF BREAST (HCC): ICD-10-CM

## 2022-02-20 DIAGNOSIS — N61.1 BREAST ABSCESS: Primary | ICD-10-CM

## 2022-02-20 DIAGNOSIS — E87.20 LACTIC ACIDOSIS: ICD-10-CM

## 2022-02-20 DIAGNOSIS — A41.9 SEPSIS, DUE TO UNSPECIFIED ORGANISM, UNSPECIFIED WHETHER ACUTE ORGAN DYSFUNCTION PRESENT (HCC): ICD-10-CM

## 2022-02-20 LAB
ALBUMIN SERPL-MCNC: 2.6 G/DL (ref 3.4–5)
ALBUMIN/GLOB SERPL: 0.5 {RATIO} (ref 0.8–1.7)
ALP SERPL-CCNC: 128 U/L (ref 45–117)
ALT SERPL-CCNC: 29 U/L (ref 13–56)
ANION GAP SERPL CALC-SCNC: 10 MMOL/L (ref 3–18)
APPEARANCE UR: CLEAR
APTT PPP: 41.5 SEC (ref 23–36.4)
AST SERPL-CCNC: 35 U/L (ref 10–38)
ATRIAL RATE: 139 BPM
BACTERIA URNS QL MICRO: NEGATIVE /HPF
BASOPHILS # BLD: 0 K/UL (ref 0–0.1)
BASOPHILS NFR BLD: 0 % (ref 0–2)
BILIRUB SERPL-MCNC: 1.7 MG/DL (ref 0.2–1)
BILIRUB UR QL: NEGATIVE
BNP SERPL-MCNC: 131 PG/ML (ref 0–450)
BUN SERPL-MCNC: 9 MG/DL (ref 7–18)
BUN/CREAT SERPL: 6 (ref 12–20)
CALCIUM SERPL-MCNC: 9 MG/DL (ref 8.5–10.1)
CALCULATED P AXIS, ECG09: 68 DEGREES
CALCULATED R AXIS, ECG10: 83 DEGREES
CALCULATED T AXIS, ECG11: 32 DEGREES
CHLORIDE SERPL-SCNC: 96 MMOL/L (ref 100–111)
CO2 SERPL-SCNC: 22 MMOL/L (ref 21–32)
COLOR UR: YELLOW
COVID-19 RAPID TEST, COVR: NOT DETECTED
CREAT SERPL-MCNC: 1.42 MG/DL (ref 0.6–1.3)
DIAGNOSIS, 93000: NORMAL
DIFFERENTIAL METHOD BLD: ABNORMAL
EOSINOPHIL # BLD: 0 K/UL (ref 0–0.4)
EOSINOPHIL NFR BLD: 0 % (ref 0–5)
EPITH CASTS URNS QL MICRO: NORMAL /LPF (ref 0–5)
ERYTHROCYTE [DISTWIDTH] IN BLOOD BY AUTOMATED COUNT: 19 % (ref 11.6–14.5)
EST. AVERAGE GLUCOSE BLD GHB EST-MCNC: 128 MG/DL
GLOBULIN SER CALC-MCNC: 4.8 G/DL (ref 2–4)
GLUCOSE BLD STRIP.AUTO-MCNC: 258 MG/DL (ref 70–110)
GLUCOSE BLD STRIP.AUTO-MCNC: 273 MG/DL (ref 70–110)
GLUCOSE SERPL-MCNC: 365 MG/DL (ref 74–99)
GLUCOSE UR STRIP.AUTO-MCNC: >1000 MG/DL
HBA1C MFR BLD: 6.1 % (ref 4.2–5.6)
HCG UR QL: NEGATIVE
HCT VFR BLD AUTO: 33.2 % (ref 35–45)
HGB BLD-MCNC: 10.5 G/DL (ref 12–16)
HGB UR QL STRIP: ABNORMAL
IMM GRANULOCYTES # BLD AUTO: 0 K/UL
IMM GRANULOCYTES NFR BLD AUTO: 0 %
INR PPP: 1.4 (ref 0.8–1.2)
KETONES UR QL STRIP.AUTO: ABNORMAL MG/DL
LACTATE BLD-SCNC: 4.36 MMOL/L (ref 0.4–2)
LACTATE BLD-SCNC: 5.25 MMOL/L (ref 0.4–2)
LEUKOCYTE ESTERASE UR QL STRIP.AUTO: NEGATIVE
LIPASE SERPL-CCNC: 138 U/L (ref 73–393)
LYMPHOCYTES # BLD: 1.5 K/UL (ref 0.9–3.6)
LYMPHOCYTES NFR BLD: 11 % (ref 21–52)
MAGNESIUM SERPL-MCNC: 1.8 MG/DL (ref 1.6–2.6)
MCH RBC QN AUTO: 27.2 PG (ref 24–34)
MCHC RBC AUTO-ENTMCNC: 31.6 G/DL (ref 31–37)
MCV RBC AUTO: 86 FL (ref 78–100)
MONOCYTES # BLD: 1.6 K/UL (ref 0.05–1.2)
MONOCYTES NFR BLD: 12 % (ref 3–10)
NEUTS SEG # BLD: 10.6 K/UL (ref 1.8–8)
NEUTS SEG NFR BLD: 77 % (ref 40–73)
NITRITE UR QL STRIP.AUTO: NEGATIVE
NRBC # BLD: 0 K/UL (ref 0–0.01)
NRBC BLD-RTO: 0 PER 100 WBC
P-R INTERVAL, ECG05: 132 MS
PH UR STRIP: 7 [PH] (ref 5–8)
PLATELET # BLD AUTO: 454 K/UL (ref 135–420)
PLATELET COMMENTS,PCOM: ABNORMAL
PMV BLD AUTO: 10.2 FL (ref 9.2–11.8)
POTASSIUM SERPL-SCNC: 4.7 MMOL/L (ref 3.5–5.5)
PROCALCITONIN SERPL-MCNC: 7.29 NG/ML
PROT SERPL-MCNC: 7.4 G/DL (ref 6.4–8.2)
PROT UR STRIP-MCNC: 100 MG/DL
PROTHROMBIN TIME: 16.7 SEC (ref 11.5–15.2)
Q-T INTERVAL, ECG07: 292 MS
QRS DURATION, ECG06: 62 MS
QTC CALCULATION (BEZET), ECG08: 444 MS
RBC # BLD AUTO: 3.86 M/UL (ref 4.2–5.3)
RBC #/AREA URNS HPF: NEGATIVE /HPF (ref 0–5)
RBC MORPH BLD: ABNORMAL
SODIUM SERPL-SCNC: 128 MMOL/L (ref 136–145)
SOURCE, COVRS: NORMAL
SP GR UR REFRACTOMETRY: 1.02 (ref 1–1.03)
TROPONIN-HIGH SENSITIVITY: 7 NG/L (ref 0–54)
UROBILINOGEN UR QL STRIP.AUTO: 1 EU/DL (ref 0.2–1)
VENTRICULAR RATE, ECG03: 139 BPM
WBC # BLD AUTO: 13.7 K/UL (ref 4.6–13.2)
WBC MORPH BLD: ABNORMAL
WBC URNS QL MICRO: NEGATIVE /HPF (ref 0–4)

## 2022-02-20 PROCEDURE — 74011000258 HC RX REV CODE- 258: Performed by: NURSE ANESTHETIST, CERTIFIED REGISTERED

## 2022-02-20 PROCEDURE — 85025 COMPLETE CBC W/AUTO DIFF WBC: CPT

## 2022-02-20 PROCEDURE — 87635 SARS-COV-2 COVID-19 AMP PRB: CPT

## 2022-02-20 PROCEDURE — 74011250636 HC RX REV CODE- 250/636: Performed by: STUDENT IN AN ORGANIZED HEALTH CARE EDUCATION/TRAINING PROGRAM

## 2022-02-20 PROCEDURE — 74011000250 HC RX REV CODE- 250: Performed by: NURSE ANESTHETIST, CERTIFIED REGISTERED

## 2022-02-20 PROCEDURE — 83880 ASSAY OF NATRIURETIC PEPTIDE: CPT

## 2022-02-20 PROCEDURE — 96365 THER/PROPH/DIAG IV INF INIT: CPT

## 2022-02-20 PROCEDURE — 96366 THER/PROPH/DIAG IV INF ADDON: CPT

## 2022-02-20 PROCEDURE — 83036 HEMOGLOBIN GLYCOSYLATED A1C: CPT

## 2022-02-20 PROCEDURE — 87205 SMEAR GRAM STAIN: CPT

## 2022-02-20 PROCEDURE — 74011250636 HC RX REV CODE- 250/636: Performed by: SURGERY

## 2022-02-20 PROCEDURE — 74011250636 HC RX REV CODE- 250/636: Performed by: INTERNAL MEDICINE

## 2022-02-20 PROCEDURE — 87040 BLOOD CULTURE FOR BACTERIA: CPT

## 2022-02-20 PROCEDURE — 77030018719 HC DRSG PTCH ANTIMIC J&J -A: Performed by: SURGERY

## 2022-02-20 PROCEDURE — 99291 CRITICAL CARE FIRST HOUR: CPT | Performed by: INTERNAL MEDICINE

## 2022-02-20 PROCEDURE — 2709999900 HC NON-CHARGEABLE SUPPLY: Performed by: SURGERY

## 2022-02-20 PROCEDURE — 77030002996 HC SUT SLK J&J -A: Performed by: SURGERY

## 2022-02-20 PROCEDURE — 74011000636 HC RX REV CODE- 636: Performed by: EMERGENCY MEDICINE

## 2022-02-20 PROCEDURE — 11971 RMVL TIS XPNDR WO INSJ IMPLT: CPT | Performed by: SURGERY

## 2022-02-20 PROCEDURE — 77030013567 HC DRN WND RESERV BARD -A: Performed by: SURGERY

## 2022-02-20 PROCEDURE — 85610 PROTHROMBIN TIME: CPT

## 2022-02-20 PROCEDURE — 87086 URINE CULTURE/COLONY COUNT: CPT

## 2022-02-20 PROCEDURE — 99292 CRITICAL CARE ADDL 30 MIN: CPT | Performed by: INTERNAL MEDICINE

## 2022-02-20 PROCEDURE — 74011250636 HC RX REV CODE- 250/636: Performed by: EMERGENCY MEDICINE

## 2022-02-20 PROCEDURE — 84484 ASSAY OF TROPONIN QUANT: CPT

## 2022-02-20 PROCEDURE — 87077 CULTURE AEROBIC IDENTIFY: CPT

## 2022-02-20 PROCEDURE — 96361 HYDRATE IV INFUSION ADD-ON: CPT

## 2022-02-20 PROCEDURE — 83735 ASSAY OF MAGNESIUM: CPT

## 2022-02-20 PROCEDURE — 83690 ASSAY OF LIPASE: CPT

## 2022-02-20 PROCEDURE — 88300 SURGICAL PATH GROSS: CPT

## 2022-02-20 PROCEDURE — 74011000250 HC RX REV CODE- 250: Performed by: EMERGENCY MEDICINE

## 2022-02-20 PROCEDURE — 80053 COMPREHEN METABOLIC PANEL: CPT

## 2022-02-20 PROCEDURE — 0HPU0NZ REMOVAL OF TISSUE EXPANDER FROM LEFT BREAST, OPEN APPROACH: ICD-10-PCS | Performed by: SURGERY

## 2022-02-20 PROCEDURE — 85730 THROMBOPLASTIN TIME PARTIAL: CPT

## 2022-02-20 PROCEDURE — 83605 ASSAY OF LACTIC ACID: CPT

## 2022-02-20 PROCEDURE — 71275 CT ANGIOGRAPHY CHEST: CPT

## 2022-02-20 PROCEDURE — 87186 SC STD MICRODIL/AGAR DIL: CPT

## 2022-02-20 PROCEDURE — 77030037400 HC ADH TISS HI VISC EXOFIN CHMP -B: Performed by: SURGERY

## 2022-02-20 PROCEDURE — 71045 X-RAY EXAM CHEST 1 VIEW: CPT

## 2022-02-20 PROCEDURE — 77030012407 HC DRN WND BARD -B: Performed by: SURGERY

## 2022-02-20 PROCEDURE — 99285 EMERGENCY DEPT VISIT HI MDM: CPT

## 2022-02-20 PROCEDURE — 93005 ELECTROCARDIOGRAM TRACING: CPT

## 2022-02-20 PROCEDURE — 65270000029 HC RM PRIVATE

## 2022-02-20 PROCEDURE — 99231 SBSQ HOSP IP/OBS SF/LOW 25: CPT | Performed by: SURGERY

## 2022-02-20 PROCEDURE — 82962 GLUCOSE BLOOD TEST: CPT

## 2022-02-20 PROCEDURE — 76210000006 HC OR PH I REC 0.5 TO 1 HR: Performed by: SURGERY

## 2022-02-20 PROCEDURE — 76010000138 HC OR TIME 0.5 TO 1 HR: Performed by: SURGERY

## 2022-02-20 PROCEDURE — 74176 CT ABD & PELVIS W/O CONTRAST: CPT

## 2022-02-20 PROCEDURE — 74011000250 HC RX REV CODE- 250: Performed by: STUDENT IN AN ORGANIZED HEALTH CARE EDUCATION/TRAINING PROGRAM

## 2022-02-20 PROCEDURE — 81001 URINALYSIS AUTO W/SCOPE: CPT

## 2022-02-20 PROCEDURE — 88305 TISSUE EXAM BY PATHOLOGIST: CPT

## 2022-02-20 PROCEDURE — 76060000032 HC ANESTHESIA 0.5 TO 1 HR: Performed by: SURGERY

## 2022-02-20 PROCEDURE — 74011250636 HC RX REV CODE- 250/636: Performed by: NURSE ANESTHETIST, CERTIFIED REGISTERED

## 2022-02-20 PROCEDURE — 81025 URINE PREGNANCY TEST: CPT

## 2022-02-20 PROCEDURE — 84145 PROCALCITONIN (PCT): CPT

## 2022-02-20 PROCEDURE — 87075 CULTR BACTERIA EXCEPT BLOOD: CPT

## 2022-02-20 PROCEDURE — 96375 TX/PRO/DX INJ NEW DRUG ADDON: CPT

## 2022-02-20 RX ORDER — FENTANYL CITRATE 50 UG/ML
50 INJECTION, SOLUTION INTRAMUSCULAR; INTRAVENOUS AS NEEDED
Status: DISCONTINUED | OUTPATIENT
Start: 2022-02-20 | End: 2022-02-20 | Stop reason: HOSPADM

## 2022-02-20 RX ORDER — SODIUM CHLORIDE 0.9 % (FLUSH) 0.9 %
5-40 SYRINGE (ML) INJECTION AS NEEDED
Status: DISCONTINUED | OUTPATIENT
Start: 2022-02-20 | End: 2022-02-25 | Stop reason: HOSPADM

## 2022-02-20 RX ORDER — ONDANSETRON HYDROCHLORIDE 8 MG/1
8 TABLET, FILM COATED ORAL
COMMUNITY
End: 2022-03-15

## 2022-02-20 RX ORDER — SODIUM CHLORIDE, SODIUM LACTATE, POTASSIUM CHLORIDE, CALCIUM CHLORIDE 600; 310; 30; 20 MG/100ML; MG/100ML; MG/100ML; MG/100ML
250 INJECTION, SOLUTION INTRAVENOUS CONTINUOUS
Status: DISCONTINUED | OUTPATIENT
Start: 2022-02-20 | End: 2022-02-20

## 2022-02-20 RX ORDER — MIDAZOLAM HYDROCHLORIDE 1 MG/ML
INJECTION, SOLUTION INTRAMUSCULAR; INTRAVENOUS AS NEEDED
Status: DISCONTINUED | OUTPATIENT
Start: 2022-02-20 | End: 2022-02-20 | Stop reason: HOSPADM

## 2022-02-20 RX ORDER — ESCITALOPRAM OXALATE 10 MG/1
10 TABLET ORAL
COMMUNITY

## 2022-02-20 RX ORDER — SODIUM CHLORIDE 0.9 % (FLUSH) 0.9 %
5-10 SYRINGE (ML) INJECTION AS NEEDED
Status: DISCONTINUED | OUTPATIENT
Start: 2022-02-20 | End: 2022-02-25 | Stop reason: HOSPADM

## 2022-02-20 RX ORDER — CETIRIZINE HCL 10 MG
10 TABLET ORAL DAILY
Status: DISCONTINUED | OUTPATIENT
Start: 2022-02-21 | End: 2022-02-25 | Stop reason: HOSPADM

## 2022-02-20 RX ORDER — FENTANYL CITRATE 50 UG/ML
INJECTION, SOLUTION INTRAMUSCULAR; INTRAVENOUS AS NEEDED
Status: DISCONTINUED | OUTPATIENT
Start: 2022-02-20 | End: 2022-02-20 | Stop reason: HOSPADM

## 2022-02-20 RX ORDER — LIDOCAINE HYDROCHLORIDE 20 MG/ML
INJECTION, SOLUTION EPIDURAL; INFILTRATION; INTRACAUDAL; PERINEURAL AS NEEDED
Status: DISCONTINUED | OUTPATIENT
Start: 2022-02-20 | End: 2022-02-20 | Stop reason: HOSPADM

## 2022-02-20 RX ORDER — ERGOCALCIFEROL 1.25 MG/1
50000 CAPSULE ORAL
Status: DISCONTINUED | OUTPATIENT
Start: 2022-02-20 | End: 2022-02-25 | Stop reason: HOSPADM

## 2022-02-20 RX ORDER — DEXTROSE MONOHYDRATE 100 MG/ML
0-250 INJECTION, SOLUTION INTRAVENOUS AS NEEDED
Status: DISCONTINUED | OUTPATIENT
Start: 2022-02-20 | End: 2022-02-25 | Stop reason: HOSPADM

## 2022-02-20 RX ORDER — PROCHLORPERAZINE EDISYLATE 5 MG/ML
5 INJECTION INTRAMUSCULAR; INTRAVENOUS ONCE
Status: DISCONTINUED | OUTPATIENT
Start: 2022-02-20 | End: 2022-02-20 | Stop reason: HOSPADM

## 2022-02-20 RX ORDER — SODIUM CHLORIDE, SODIUM LACTATE, POTASSIUM CHLORIDE, CALCIUM CHLORIDE 600; 310; 30; 20 MG/100ML; MG/100ML; MG/100ML; MG/100ML
1000 INJECTION, SOLUTION INTRAVENOUS ONCE
Status: DISCONTINUED | OUTPATIENT
Start: 2022-02-20 | End: 2022-02-20

## 2022-02-20 RX ORDER — ALBUTEROL SULFATE 90 UG/1
1 AEROSOL, METERED RESPIRATORY (INHALATION)
Status: DISCONTINUED | OUTPATIENT
Start: 2022-02-20 | End: 2022-02-25 | Stop reason: HOSPADM

## 2022-02-20 RX ORDER — FENTANYL CITRATE 50 UG/ML
50 INJECTION, SOLUTION INTRAMUSCULAR; INTRAVENOUS
Status: COMPLETED | OUTPATIENT
Start: 2022-02-20 | End: 2022-02-20

## 2022-02-20 RX ORDER — MORPHINE SULFATE 1 MG/ML
4 INJECTION, SOLUTION EPIDURAL; INTRATHECAL; INTRAVENOUS
Status: DISCONTINUED | OUTPATIENT
Start: 2022-02-20 | End: 2022-02-20

## 2022-02-20 RX ORDER — INSULIN LISPRO 100 [IU]/ML
INJECTION, SOLUTION INTRAVENOUS; SUBCUTANEOUS
Status: DISCONTINUED | OUTPATIENT
Start: 2022-02-21 | End: 2022-02-21

## 2022-02-20 RX ORDER — MORPHINE SULFATE 1 MG/ML
4 INJECTION, SOLUTION EPIDURAL; INTRATHECAL; INTRAVENOUS
Status: DISCONTINUED | OUTPATIENT
Start: 2022-02-20 | End: 2022-02-20 | Stop reason: HOSPADM

## 2022-02-20 RX ORDER — SODIUM CHLORIDE 9 MG/ML
INJECTION, SOLUTION INTRAVENOUS
Status: COMPLETED | OUTPATIENT
Start: 2022-02-20 | End: 2022-02-20

## 2022-02-20 RX ORDER — CEFEPIME HYDROCHLORIDE 2 G/1
2 INJECTION, POWDER, FOR SOLUTION INTRAVENOUS EVERY 12 HOURS
Status: DISCONTINUED | OUTPATIENT
Start: 2022-02-20 | End: 2022-02-20

## 2022-02-20 RX ORDER — SODIUM CHLORIDE 0.9 % (FLUSH) 0.9 %
5-40 SYRINGE (ML) INJECTION EVERY 8 HOURS
Status: DISCONTINUED | OUTPATIENT
Start: 2022-02-20 | End: 2022-02-25 | Stop reason: HOSPADM

## 2022-02-20 RX ORDER — PROPOFOL 10 MG/ML
INJECTION, EMULSION INTRAVENOUS AS NEEDED
Status: DISCONTINUED | OUTPATIENT
Start: 2022-02-20 | End: 2022-02-20 | Stop reason: HOSPADM

## 2022-02-20 RX ORDER — SODIUM CHLORIDE, SODIUM LACTATE, POTASSIUM CHLORIDE, CALCIUM CHLORIDE 600; 310; 30; 20 MG/100ML; MG/100ML; MG/100ML; MG/100ML
75 INJECTION, SOLUTION INTRAVENOUS CONTINUOUS
Status: DISCONTINUED | OUTPATIENT
Start: 2022-02-20 | End: 2022-02-23

## 2022-02-20 RX ORDER — ESCITALOPRAM OXALATE 10 MG/1
10 TABLET ORAL DAILY
Status: DISCONTINUED | OUTPATIENT
Start: 2022-02-21 | End: 2022-02-25 | Stop reason: HOSPADM

## 2022-02-20 RX ORDER — ERGOCALCIFEROL 1.25 MG/1
50000 CAPSULE ORAL
COMMUNITY

## 2022-02-20 RX ORDER — ONDANSETRON 2 MG/ML
4 INJECTION INTRAMUSCULAR; INTRAVENOUS
Status: DISCONTINUED | OUTPATIENT
Start: 2022-02-20 | End: 2022-02-25 | Stop reason: HOSPADM

## 2022-02-20 RX ORDER — MAGNESIUM SULFATE 100 %
16 CRYSTALS MISCELLANEOUS AS NEEDED
Status: DISCONTINUED | OUTPATIENT
Start: 2022-02-20 | End: 2022-02-25 | Stop reason: HOSPADM

## 2022-02-20 RX ORDER — SODIUM CHLORIDE, SODIUM LACTATE, POTASSIUM CHLORIDE, CALCIUM CHLORIDE 600; 310; 30; 20 MG/100ML; MG/100ML; MG/100ML; MG/100ML
50 INJECTION, SOLUTION INTRAVENOUS CONTINUOUS
Status: DISCONTINUED | OUTPATIENT
Start: 2022-02-20 | End: 2022-02-20 | Stop reason: HOSPADM

## 2022-02-20 RX ORDER — KETAMINE HCL 50MG/ML(1)
SYRINGE (ML) INTRAVENOUS AS NEEDED
Status: DISCONTINUED | OUTPATIENT
Start: 2022-02-20 | End: 2022-02-20 | Stop reason: HOSPADM

## 2022-02-20 RX ORDER — ONDANSETRON 4 MG/1
8 TABLET, FILM COATED ORAL
Status: DISCONTINUED | OUTPATIENT
Start: 2022-02-20 | End: 2022-02-25 | Stop reason: HOSPADM

## 2022-02-20 RX ORDER — VANCOMYCIN 2 GRAM/500 ML IN 0.9 % SODIUM CHLORIDE INTRAVENOUS
2000 ONCE
Status: COMPLETED | OUTPATIENT
Start: 2022-02-20 | End: 2022-02-20

## 2022-02-20 RX ORDER — OXYBUTYNIN CHLORIDE 5 MG/1
5 TABLET ORAL DAILY
Status: DISCONTINUED | OUTPATIENT
Start: 2022-02-21 | End: 2022-02-25 | Stop reason: HOSPADM

## 2022-02-20 RX ORDER — ONDANSETRON 2 MG/ML
4 INJECTION INTRAMUSCULAR; INTRAVENOUS
Status: COMPLETED | OUTPATIENT
Start: 2022-02-20 | End: 2022-02-20

## 2022-02-20 RX ORDER — ONDANSETRON 2 MG/ML
INJECTION INTRAMUSCULAR; INTRAVENOUS AS NEEDED
Status: DISCONTINUED | OUTPATIENT
Start: 2022-02-20 | End: 2022-02-20 | Stop reason: HOSPADM

## 2022-02-20 RX ADMIN — ONDANSETRON 4 MG: 2 INJECTION INTRAMUSCULAR; INTRAVENOUS at 20:20

## 2022-02-20 RX ADMIN — FENTANYL CITRATE 50 MCG: 50 INJECTION, SOLUTION INTRAMUSCULAR; INTRAVENOUS at 20:30

## 2022-02-20 RX ADMIN — SODIUM CHLORIDE, SODIUM LACTATE, POTASSIUM CHLORIDE, AND CALCIUM CHLORIDE 150 ML/HR: 600; 310; 30; 20 INJECTION, SOLUTION INTRAVENOUS at 22:40

## 2022-02-20 RX ADMIN — SODIUM CHLORIDE, SODIUM LACTATE, POTASSIUM CHLORIDE, AND CALCIUM CHLORIDE 1000 ML: 600; 310; 30; 20 INJECTION, SOLUTION INTRAVENOUS at 12:38

## 2022-02-20 RX ADMIN — VANCOMYCIN HYDROCHLORIDE 2000 MG: 10 INJECTION, POWDER, LYOPHILIZED, FOR SOLUTION INTRAVENOUS at 11:07

## 2022-02-20 RX ADMIN — IOPAMIDOL 65 ML: 755 INJECTION, SOLUTION INTRAVENOUS at 13:28

## 2022-02-20 RX ADMIN — PHENYLEPHRINE HYDROCHLORIDE 150 MCG: 10 INJECTION INTRAVENOUS at 20:23

## 2022-02-20 RX ADMIN — SODIUM CHLORIDE, SODIUM LACTATE, POTASSIUM CHLORIDE, AND CALCIUM CHLORIDE 171 ML: 600; 310; 30; 20 INJECTION, SOLUTION INTRAVENOUS at 15:11

## 2022-02-20 RX ADMIN — SODIUM CHLORIDE, SODIUM LACTATE, POTASSIUM CHLORIDE, AND CALCIUM CHLORIDE 1000 ML: 600; 310; 30; 20 INJECTION, SOLUTION INTRAVENOUS at 17:25

## 2022-02-20 RX ADMIN — ONDANSETRON 4 MG: 2 INJECTION INTRAMUSCULAR; INTRAVENOUS at 18:12

## 2022-02-20 RX ADMIN — FENTANYL CITRATE 50 MCG: 50 INJECTION, SOLUTION INTRAMUSCULAR; INTRAVENOUS at 20:21

## 2022-02-20 RX ADMIN — SODIUM CHLORIDE, SODIUM LACTATE, POTASSIUM CHLORIDE, AND CALCIUM CHLORIDE 150 ML/HR: 600; 310; 30; 20 INJECTION, SOLUTION INTRAVENOUS at 17:22

## 2022-02-20 RX ADMIN — FENTANYL CITRATE 50 MCG: 50 INJECTION, SOLUTION INTRAMUSCULAR; INTRAVENOUS at 20:23

## 2022-02-20 RX ADMIN — LIDOCAINE HYDROCHLORIDE 80 MG: 20 INJECTION, SOLUTION EPIDURAL; INFILTRATION; INTRACAUDAL; PERINEURAL at 20:18

## 2022-02-20 RX ADMIN — SODIUM CHLORIDE, SODIUM LACTATE, POTASSIUM CHLORIDE, AND CALCIUM CHLORIDE 1000 ML: 600; 310; 30; 20 INJECTION, SOLUTION INTRAVENOUS at 10:55

## 2022-02-20 RX ADMIN — Medication 30 MG: at 20:38

## 2022-02-20 RX ADMIN — SODIUM CHLORIDE, PRESERVATIVE FREE 10 ML: 5 INJECTION INTRAVENOUS at 22:24

## 2022-02-20 RX ADMIN — SODIUM CHLORIDE, SODIUM LACTATE, POTASSIUM CHLORIDE, AND CALCIUM CHLORIDE 1000 ML: 600; 310; 30; 20 INJECTION, SOLUTION INTRAVENOUS at 14:05

## 2022-02-20 RX ADMIN — Medication 20 MG: at 20:45

## 2022-02-20 RX ADMIN — FENTANYL CITRATE 50 MCG: 50 INJECTION, SOLUTION INTRAMUSCULAR; INTRAVENOUS at 20:17

## 2022-02-20 RX ADMIN — FENTANYL CITRATE 50 MCG: 50 INJECTION INTRAMUSCULAR; INTRAVENOUS at 19:04

## 2022-02-20 RX ADMIN — MIDAZOLAM HYDROCHLORIDE 2 MG: 2 INJECTION, SOLUTION INTRAMUSCULAR; INTRAVENOUS at 20:14

## 2022-02-20 RX ADMIN — VANCOMYCIN HYDROCHLORIDE 750 MG: 750 INJECTION, POWDER, LYOPHILIZED, FOR SOLUTION INTRAVENOUS at 23:13

## 2022-02-20 RX ADMIN — PROPOFOL 160 MG: 10 INJECTION, EMULSION INTRAVENOUS at 20:18

## 2022-02-20 RX ADMIN — CEFEPIME HYDROCHLORIDE 2 G: 2 INJECTION, POWDER, FOR SOLUTION INTRAVENOUS at 11:07

## 2022-02-20 NOTE — CONSULTS
Ms. 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 Haleigh Delaney 21 and lap barrie by Dr. Kelvin Shane 22. She completed neoadjuvant chemotherapy. Plan had been for implant exchange, however due to severe abdominal pain attributed to recent lap barrie, it was canceled. She presented to the ED with abdominal pain and lightheadness. She reports recent fall. Her left breast was swollen and painful with fluid collection on imaging. She was felt to be septic from infected expander, possibly infected hematoma from recent trauma. Discussed with Dr. Rhonda Velazco who does not suspect intraabdominal source. I have been asked to remove tissue expander and evacuate fluid collection. Also discussed with Dr. Jinny Lloyd. Breast/GYN history:    OB History        1    Para   1    Term   1            AB        Living   1       SAB        IAB        Ectopic        Molar        Multiple   0    Live Births   1                 Past Medical History:   Diagnosis Date    Asthma     last attack years ago    Cancer [facility])     BREAST    Chronic pain of left thumb        Past Surgical History:   Procedure Laterality Date    HAND/FINGER SURGERY UNLISTED      left thumb    HX BREAST BIOPSY      HX BREAST RECONSTRUCTION Bilateral 2021    BILATERAL BREAST RECONSTRUCTION WITH TISSUE EXPANDERS & ADIPOFASCIAL FLAPS performed by Antoine Rojas MD at [address] HX  SECTION  2016    HX LAP CHOLECYSTECTOMY      HX MASTECTOMY Bilateral 2021    BILATERAL SKIN SPARING MASTECTOMY; LEFT SENTINEL NODE BIOPSY performed by Kelvin Shane MD at [facility]       No current facility-administered medications on file prior to encounter. Current Outpatient Medications on File Prior to Encounter   Medication Sig Dispense Refill    ergocalciferol (Vitamin D2) 1,250 mcg (50,000 unit) capsule Take 50,000 Units by mouth every seven (7) days.  escitalopram oxalate (Lexapro) 10 mg tablet Take 10 mg by mouth daily.  loratadine 10 mg cap Take 10 mg by mouth.  ondansetron hcl (ZOFRAN) 8 mg tablet Take 8 mg by mouth every eight (8) hours as needed for Nausea or Vomiting.  OXYBUTYNIN CHLORIDE PO Take 5 mg by mouth daily.  albuterol (PROVENTIL HFA, VENTOLIN HFA, PROAIR HFA) 90 mcg/actuation inhaler Take 1 Puff by inhalation every four (4) hours as needed for Wheezing. 1 Inhaler 0       Allergies   Allergen Reactions    Percocet [Oxycodone-Acetaminophen] Swelling     Pt can not remember if her mom told her  percocet or penicillin allergy so she does not take either. patient reports tylenol makes her eyes swell. States she is not allergic to Percocet. She has never taken it.  Tylenol [Acetaminophen] Anaphylaxis    Dilaudid [Hydromorphone] Hives    Penicillins Other (comments)     Pt stated had allergy as a child as per her mother.  Gadavist [Gadobutrol] Nausea Only and Other (comments)     CO NAUSEA AND HEADACHE POST INJECTION.        Social History     Tobacco Use    Smoking status: Never Smoker    Smokeless tobacco: Never Used   Substance Use Topics    Alcohol use: Yes     Comment: OCASSIONAL    Drug use: Never       Family History   Problem Relation Age of Onset    Diabetes Mother     No Known Problems Father          ROS: positives are bolded  General: fevers, chills, night sweats, fatigue, weight loss, weight gain  GI: nausea, vomiting, abdominal pain, change in bowel habits, hematochezia, melena, GERD  Integ: dermatitis, abnormal moles  HEENT: visual changes, vertigo, epistaxis, dysphagia, hoarseness  Cardiac: chest pain, palpitations, HTN, edema, varicosities  Resp: cough, shortness of breath, wheezing, hemoptysis, snoring, reactive airway disease  : hematuria, dysuria, frequency, urgency, nocturia, stress urinary incontinence   MSK: weakness, joint pain, arthritis  Endocrine:  thyroid disease, polyuria, polydipsia, polyphagia, poor wound healing, heat intolerance, cold intolerance  Lymph/Heme: anemia, bruising, history of blood transfusions  Neuro: dizziness, headache, fainting, seizures, stroke  Psych: anxiety, depression    Physical Exam:  Visit Vitals  /74   Pulse (!) 150   Temp 99.7 °F (37.6 °C)   Resp 20   Ht 5' (1.524 m)   Wt 105.7 kg (233 lb)   SpO2 100%   BMI 45.50 kg/m²       Gen: In stretcher  Head: normocephalic, atraumatic  Mouth: mask  Neck: supple, no masses, no adenopathy, trachea midline  Resp: coarse  Cardio: tachy  Abdomen: deferred  Extremeties: warm, well-perfused  Neuro: sensation and strength grossly intact and symmetrical  Psych: alert and oriented to person, place and time  Breasts:   Right: deferred  Left: palpation deferred, red, swollen      Imagin22 CT abd/pelvis  Trace residual fluid in the gallbladder fossa, decreased from previous. No  finding for postoperative abscess in the abdomen or pelvis.     Please see same day CT chest report regarding left breast implant fluid  collection. .    22 CT chest  No evidence of acute pulmonary embolism as described.     Large fluid collection in the left breast around implant, associated with  stranding in the surrounding fat and overlying skin thickening. Abscess cannot  be excluded. Fluid drainage/aspiration might be helpful and breast  radiology/surgery referral for follow-up as discussed above.       Pathology:  22  Gallbladder, resection:        Cholelithiasis with chronic cholecystitis. 21. A. LYMPH NODE, CLINICALLY LEFT:  -NO TUMOR SEEN (SEE COMMENT). COMMENT: Three H & E sections and one immunoperoxidase stain for CK7 were evaluated  B. LYMPH NODE, CLINICALLY LEFT SENTINEL #2:  - NO TUMOR SEEN (SEE COMMENT). COMMENT:  Three H & E sections and one immunoperoxidase stain for CK7 were evaluated.   C. BREAST, MASTECTOMY:  - FOCAL RESIDUAL INFILTRATING DUCTAL CARCINOMA (SIZE 0.3 CM) WITH  NEGATIVE SURGICAL MARGINS. HORMONE RECEPTOR AND HER2 IMMUNOHISTOCHEMISTRY  1. INTERPRETATION:  A. ESTROGEN RECEPTOR: NEGATIVE  B. PROGESTERONE RECEPTOR: NEGATIVE  C. HER2: 0, NOT OVEREXPRESSED  (See below for ER/AR/HER2 grading criteria). D. BREAST, CLINICALLY LEFT MASTECTOMY:  - BENIGN SKIN AND BREAST TISSUE. Impression:  Patient Active Problem List   Diagnosis Code    Pregnancy Z34.90    Felon of finger of right hand L03.011    Cellulitis and abscess of hand L03.119, L02.519    Breast cancer ([facility] [address] C50.919    Cholecystitis K81.9    Breast abscess N61.1    Sepsis ([facility] [address] A41.9    Abscess of left breast N61.1         31year old woman with left triple negative berast cancer, s/p bilateral mastectomy by Dr. Judy Masters 8/26/21 and lap barrie by Dr. Yaquelin Frye 2/7/22. Admission to medicine: IVF, antibiotics, etc per IM. Will remove expander, drain collection. Risks, benefits and options were discussed in detail to include, but not limited to, bleeding, infection, risks of anesthesia, injury to surrounding structures and other unforeseen events such as stroke, heart attack or death. I have recommended removal of left tissue expander, drainage of fluid collection, possible biopsy, possible drain placement. We discussed that if the breast is not the source of her sepsis, removal of the implant may not help. All questions were answered. She would like to proceed tonight.

## 2022-02-20 NOTE — H&P
Admission History and Physical  [facility]      Patient: Brennan Dickinson MRN: [id number]  CSN: [id number]    YOB: 1995  Age: 31 y.o. Sex: female      Admission Date: 2/20/2022       HPI:     Brennan Dickinson is a 31 y.o. female with PMH breast cancer status post bilateral mastectomies with implant placement, most recently cholecystitis requiring cholecystectomy on 2/8/2022 presenting w/ syncope and abdominal pain. Patient states that she has not been eating much or drinking much. Patient also states that she had 2 episodes of emesis starting yesterday. She said that she had a syncopal episode yesterday where she hit her head and she also had some left thigh pain. Also states having syncopal episode 2/15. She denies any chest pain or palpitation. She states that she is somewhat shortness short of breath on exertion . patient states that about 10 days ago or so she had a gallbladder surgery. Patient states her incision site are not painful unless you press on it hard. Of note patient had mastectomy several months ago. .  Incision sites have healed well but she does endorse that about 6 days ago her left breast was swollen and painful to touch. She has not sought out any care for the left breast that is swollen painful. .  She denies any tampon use she states that she has not had her period for several months. Pt states that she is seen by Dr. Sabrina Godoy. Patient states that she was taking some chemotherapy capecitabine 500mg twice a day for 2 weeks. She is supposed to start this medication again on 2/23.         In ED:  Patient had a white count: 13.7 with left shift  Elevation in platelet: 642  Hemoglobin of 10.5 ( baseline 10s)   Creatinine of 1.4 (baseline 0.8)   CTA chest did not show any PE, but showed a large fluid collection in the left breast concerning for abscess  Chest x-ray showed no pneumonia  UA was negative for LE and nitrites  Patient was given cefepime and vancomycin  Patient was given 3 L of fluid, and to 250 cc/hr of LR  Dr Azeb Reza surgeon was contacted. ED doc stated that breast surgeon was coming to take patient to the OR        Review of Systems:  General:  +chills, -fever,  +poor po intake  Resp: - sob, +cough, -wheezing, -orthopnea, - PND, -edema,  - hemoptysis,  Cardic:  - chest pain, -palpitations, -dizziness,-syncope, -PRESTON  GI: + nausea, +emesis, +ab pain, + diarrhea (3 watery stools), urinary incontinence due to not being able to get to the bathroom on time  : -suprapubic pain, -dysuria,      Past Medical History:   Diagnosis Date    Asthma     last attack years ago    Cancer [facility])     BREAST    Chronic pain of left thumb        Past Surgical History:   Procedure Laterality Date    HAND/FINGER SURGERY UNLISTED      left thumb    HX BREAST BIOPSY      HX BREAST RECONSTRUCTION Bilateral 2021    BILATERAL BREAST RECONSTRUCTION WITH TISSUE EXPANDERS & ADIPOFASCIAL FLAPS performed by Tramaine Parmar MD at [facility] OR    HX  SECTION  2016    HX LAP CHOLECYSTECTOMY      HX MASTECTOMY Bilateral 2021    BILATERAL SKIN SPARING MASTECTOMY; LEFT SENTINEL NODE BIOPSY performed by Fabi Mc MD at [facility] OR       Family History   Problem Relation Age of Onset    Diabetes Mother     No Known Problems Father        Social History     Socioeconomic History    Marital status: SINGLE   Tobacco Use    Smoking status: Never Smoker    Smokeless tobacco: Never Used   Substance and Sexual Activity    Alcohol use: Yes     Comment: OCASSIONAL    Drug use: Never    Sexual activity: Yes     Birth control/protection: None       Allergies   Allergen Reactions    Percocet [Oxycodone-Acetaminophen] Swelling     Pt can not remember if her mom told her  percocet or penicillin allergy so she does not take either. patient reports tylenol makes her eyes swell. States she is not allergic to Percocet. She has never taken it.      Tylenol [Acetaminophen] Anaphylaxis    Penicillins Other (comments)     Pt stated had allergy as a child as per her mother.  Gadavist [Gadobutrol] Nausea Only and Other (comments)     CO NAUSEA AND HEADACHE POST INJECTION. Prior to Admission Medications   Prescriptions Last Dose Informant Patient Reported? Taking? OXYBUTYNIN CHLORIDE PO   Yes No   Sig: Take 5 mg by mouth daily. albuterol (PROVENTIL HFA, VENTOLIN HFA, PROAIR HFA) 90 mcg/actuation inhaler   No No   Sig: Take 1 Puff by inhalation every four (4) hours as needed for Wheezing.   ergocalciferol (Vitamin D2) 1,250 mcg (50,000 unit) capsule   Yes Yes   Sig: Take 50,000 Units by mouth every seven (7) days. escitalopram oxalate (Lexapro) 10 mg tablet   Yes Yes   Sig: Take 10 mg by mouth daily. loratadine 10 mg cap   Yes Yes   Sig: Take 10 mg by mouth. ondansetron hcl (ZOFRAN) 8 mg tablet   Yes Yes   Sig: Take 8 mg by mouth every eight (8) hours as needed for Nausea or Vomiting. Facility-Administered Medications: None         Physical Exam:     Patient Vitals for the past 24 hrs:   Temp Pulse Resp BP SpO2   02/20/22 1600 -- (!) 144 17 137/78 100 %   02/20/22 1530 -- (!) 143 18 134/78 98 %   02/20/22 1500 -- -- 20 102/62 99 %   02/20/22 1300 -- (!) 132 26 123/76 100 %   02/20/22 1230 -- (!) 131 (!) 32 (!) 126/90 100 %   02/20/22 1200 -- (!) 133 28 (!) 120/92 100 %   02/20/22 1150 -- (!) 138 18 117/87 100 %   02/20/22 1130 -- (!) 140 19 (!) 115/94 100 %   02/20/22 0858 99.7 °F (37.6 °C) (!) 146 18 103/75 100 %       Physical Exam:  General:  NAD, Not in acute distress, obese  HEENT: Conjunctiva pink, sclera anicteric. CV:  RRR, no murmurs   RESP:  Unlabored breathing. Lungs clear to auscultation without adventitious breath sounds. Equal expansion bilaterally. Breasts: Left-sided breast is very swollen very tender to touch and warm, right breast is a with within normal limits  ABD:  Soft, nontender, nondistended. BS (+).    No suprapubic tenderness. No CVA tenderness. Patient's incision site for gallbladder surgery will clean, no redness or swelling or significant tenderness to touch  Neuro: Answers questions appropriately, Moves all four extremity MS:  No joint deformity or instability. No atrophy. Ext:  No edema. 2+ radial and dp pulses bilaterally. Skin:  No rashes, lesions, or ulcers. Good turgor. Chemistry Recent Labs     02/20/22  0940   *   *   K 4.7   CL 96*   CO2 22   BUN 9   CREA 1.42*   CA 9.0   MG 1.8   AGAP 10   BUCR 6*   *   TP 7.4   ALB 2.6*   GLOB 4.8*   AGRAT 0.5*        CBC w/Diff Recent Labs     02/20/22  0940   WBC 13.7*   RBC 3.86*   HGB 10.5*   HCT 33.2*   *   GRANS 77*   LYMPH 11*   EOS 0        Liver Enzymes Protein, total   Date Value Ref Range Status   02/20/2022 7.4 6.4 - 8.2 g/dL Final     Albumin   Date Value Ref Range Status   02/20/2022 2.6 (L) 3.4 - 5.0 g/dL Final     Globulin   Date Value Ref Range Status   02/20/2022 4.8 (H) 2.0 - 4.0 g/dL Final     A-G Ratio   Date Value Ref Range Status   02/20/2022 0.5 (L) 0.8 - 1.7   Final     Alk. phosphatase   Date Value Ref Range Status   02/20/2022 128 (H) 45 - 117 U/L Final     Recent Labs     02/20/22  0940   TP 7.4   ALB 2.6*   GLOB 4.8*   AGRAT 0.5*   *        Lactic Acid Lactic acid   Date Value Ref Range Status   02/06/2022 3.9 (HH) 0.4 - 2.0 MMOL/L Final     Comment:     CALLED TO AND CORRECTLY REPEATED BY:  DAIANA Xie [id number] TO [facility] ON 2/6/22       No results for input(s): LAC in the last 72 hours.      BNP No results found for: BNP, BNPP, XBNPT     Cardiac Enzymes No results found for: CPK, CK, CKMMB, CKMB, RCK3, CKMBT, CKNDX, CKND1, JACQUELINE, TROPT, TROIQ, BENITO, TROPT, TNIPOC, BNP, BNPP     Coagulation Recent Labs     02/20/22  0940   PTP 16.7*   INR 1.4*   APTT 41.5*         Thyroid  No results found for: T4, T3U, TSH, TSHEXT       Lipid Panel No results found for: CHOL, CHOLPOCT, CHOLX, CHLST, CHOLV, 063491, HDL, HDLP, LDL, LDLC, DLDLP, 336580, VLDLC, VLDL, TGLX, TRIGL, TRIGP, TGLPOCT, CHHD, CHHDX     ABG No results for input(s): PHI, PHI, POC2, PCO2I, PO2, PO2I, HCO3, HCO3I, FIO2, FIO2I in the last 72 hours. Urinalysis Lab Results   Component Value Date/Time    Color YELLOW 02/20/2022 10:50 AM    Appearance CLEAR 02/20/2022 10:50 AM    Specific gravity 1.021 02/20/2022 10:50 AM    pH (UA) 7.0 02/20/2022 10:50 AM    Protein 100 (A) 02/20/2022 10:50 AM    Glucose >1,000 (A) 02/20/2022 10:50 AM    Ketone TRACE (A) 02/20/2022 10:50 AM    Bilirubin Negative 02/20/2022 10:50 AM    Urobilinogen 1.0 02/20/2022 10:50 AM    Nitrites Negative 02/20/2022 10:50 AM    Leukocyte Esterase Negative 02/20/2022 10:50 AM    Epithelial cells FEW 02/20/2022 10:50 AM    Bacteria Negative 02/20/2022 10:50 AM    WBC Negative 02/20/2022 10:50 AM    RBC Negative 02/20/2022 10:50 AM        Micro No results for input(s): SDES, CULT in the last 72 hours. No results for input(s): CULT in the last 72 hours. Imaging:  XR (Most Recent). Results from [place] encounter on 02/20/22    XR CHEST SNGL V    Narrative  AP CHEST, PORTABLE    INDICATION: Above. Syncope. Abdominal pain. COMPARISON: No prior chest exams in PACS. Reference abdominopelvic CT  2/16/2022. .    TECHNIQUE: Portable semi-upright AP chest radiograph is reviewed. FINDINGS:    EKG leads/wires overlie the patient. Breast implants, as noted on abdominal CT  2/16/2022. No evidence of focal pulmonary consolidation or pulmonary edema. No evidence of  pneumothorax. The costophrenic sulci appear sharp. The cardiac silhouette is within normal limits in size. Impression  No evidence of acute pulmonary disease.        CT (Most Recent) Results from Hospital Encounter encounter on 02/20/22    CTA CHEST W OR W WO CONT    Narrative  CTA OF THE CHEST, WITH CORONAL AND SAGITTAL REFORMATTED MIP IMAGES, PULMONARY  EMBOLISM PROTOCOL    CPT CODE: 84557    INDICATION: Above. Tachycardia. Clinical concern for pulmonary embolism. History  reportedly also significant for left breast pain with intense erythema. COMPARISON: No prior chest CT in PACS. Reference abdominopelvic CT 2/16/2022. TECHNIQUE: With IV administration of 65 ml Isovue-370, axial CT images through  the thorax were obtained using helical technique following a pulmonary embolism  protocol. In order more optimally to evaluate the pulmonary arterial tree in  multiplanar CT angiographic fashion, coronal and sagittal reformation maximum  intensity projection (MIP) images were also performed. All CT scans at this facility are performed using dose optimization technique as  appropriate to the performed exam, to include automated exposure control,  adjustment of the mA and/or kV according to patient's size (Including  appropriate matching for site-specific examinations), or use of iterative  reconstruction technique. FINDINGS:    Suboptimal contrast bolus. There is near iso enhancement of the thoracic aorta  and pulmonary veins to the pulmonary arteries. Image quality is further degraded  by body habitus and streak artifacts from metallic densities in bilateral breast  implants, with heterogeneous mottled appearance of the enhanced vessels. There  is no convincing evidence of pulmonary arterial filling defect in the main,  central, lobar, segmental or enhanced larger subsegmental branches of the  pulmonary arterial tree indicative of acute pulmonary embolism. No evidence of suspicious pulmonary nodule/mass or consolidation. No evidence of pleural or significant pericardial effusion is seen. No evidence of pathologic lymph node enlargement is seen. The thoracic aorta enhances normally. Usual cardiac motion artifacts noted. In the partially visualized portions of the breasts, bilateral breast implants  are seen.  There are linear internal densities in the implants likely reflecting  intracapsular ruptures bilaterally. On the left side, there is large fluid  around the implant. At level just cephalad to the implant fluid measures 10 cm  transverse x 7.6 cm AP, just inferior to the implant 12 cm transverse x 5.1 cm  AP. No gas bubbles are seen within the fluid collection. Suspect subtle soft  tissue rim around the fluid collection. There is overlying fairly diffuse left  breast skin thickening partially visualized, best seen inferiorly. There is  stranding in the fat of the left breast around the fluid collection suspicious  for inflammatory changes. Findings are concerning for left breast  mastitis/cellulitis and abscess given the reported history. Fluid  drainage/aspiration might be helpful as unlikely warranted. Follow-up dedicated  breast evaluation in dedicated breast center radiology or with the patient's  breast surgeon will be needed after treatment to confirm resolution and exclude  neoplasm. On review of bone windows, no acute fractures or destructive bone lesions are  seen. Impression  No evidence of acute pulmonary embolism as described. Large fluid collection in the left breast around implant, associated with  stranding in the surrounding fat and overlying skin thickening. Abscess cannot  be excluded. Fluid drainage/aspiration might be helpful and breast  radiology/surgery referral for follow-up as discussed above. ECHO No results found for this or any previous visit. EKG No results found for this or any previous visit.      Recent Results (from the past 12 hour(s))   EKG, 12 LEAD, INITIAL    Collection Time: 02/20/22  9:12 AM   Result Value Ref Range    Ventricular Rate 139 BPM    Atrial Rate 139 BPM    P-R Interval 132 ms    QRS Duration 62 ms    Q-T Interval 292 ms    QTC Calculation (Bezet) 444 ms    Calculated P Axis 68 degrees    Calculated R Axis 83 degrees    Calculated T Axis 32 degrees    Diagnosis       Sinus tachycardia  Abnormal ECG  When compared with ECG of 05-JUL-2021 12:46,  Vent. rate has increased BY  66 BPM  T wave inversion less evident in Anterior leads  Confirmed by Vika Courtney MD, --- ([id number]) on 2/20/2022 12:06:18 PM     CBC WITH AUTOMATED DIFF    Collection Time: 02/20/22  9:40 AM   Result Value Ref Range    WBC 13.7 (H) 4.6 - 13.2 K/uL    RBC 3.86 (L) 4.20 - 5.30 M/uL    HGB 10.5 (L) 12.0 - 16.0 g/dL    HCT 33.2 (L) 35.0 - 45.0 %    MCV 86.0 78.0 - 100.0 FL    MCH 27.2 24.0 - 34.0 PG    MCHC 31.6 31.0 - 37.0 g/dL    RDW 19.0 (H) 11.6 - 14.5 %    PLATELET 486 (H) 027 - 420 K/uL    MPV 10.2 9.2 - 11.8 FL    NRBC 0.0 0  WBC    ABSOLUTE NRBC 0.00 0.00 - 0.01 K/uL    NEUTROPHILS 77 (H) 40 - 73 %    LYMPHOCYTES 11 (L) 21 - 52 %    MONOCYTES 12 (H) 3 - 10 %    EOSINOPHILS 0 0 - 5 %    BASOPHILS 0 0 - 2 %    IMMATURE GRANULOCYTES 0 %    ABS. NEUTROPHILS 10.6 (H) 1.8 - 8.0 K/UL    ABS. LYMPHOCYTES 1.5 0.9 - 3.6 K/UL    ABS. MONOCYTES 1.6 (H) 0.05 - 1.2 K/UL    ABS. EOSINOPHILS 0.0 0.0 - 0.4 K/UL    ABS. BASOPHILS 0.0 0.0 - 0.1 K/UL    ABS. IMM. GRANS. 0.0 K/UL    DF MANUAL      PLATELET COMMENTS Increased Platelets      RBC COMMENTS ANISOCYTOSIS  1+        RBC COMMENTS HYPOCHROMIA  1+        RBC COMMENTS MICROCYTOSIS  1+        WBC COMMENTS VACUOLATED POLYS     METABOLIC PANEL, COMPREHENSIVE    Collection Time: 02/20/22  9:40 AM   Result Value Ref Range    Sodium 128 (L) 136 - 145 mmol/L    Potassium 4.7 3.5 - 5.5 mmol/L    Chloride 96 (L) 100 - 111 mmol/L    CO2 22 21 - 32 mmol/L    Anion gap 10 3.0 - 18 mmol/L    Glucose 365 (H) 74 - 99 mg/dL    BUN 9 7.0 - 18 MG/DL    Creatinine 1.42 (H) 0.6 - 1.3 MG/DL    BUN/Creatinine ratio 6 (L) 12 - 20      GFR est AA 54 (L) >60 ml/min/1.73m2    GFR est non-AA 44 (L) >60 ml/min/1.73m2    Calcium 9.0 8.5 - 10.1 MG/DL    Bilirubin, total 1.7 (H) 0.2 - 1.0 MG/DL    ALT (SGPT) 29 13 - 56 U/L    AST (SGOT) 35 10 - 38 U/L    Alk.  phosphatase 128 (H) 45 - 117 U/L    Protein, total 7.4 6.4 - 8.2 g/dL Albumin 2.6 (L) 3.4 - 5.0 g/dL    Globulin 4.8 (H) 2.0 - 4.0 g/dL    A-G Ratio 0.5 (L) 0.8 - 1.7     PROTHROMBIN TIME + INR    Collection Time: 02/20/22  9:40 AM   Result Value Ref Range    Prothrombin time 16.7 (H) 11.5 - 15.2 sec    INR 1.4 (H) 0.8 - 1.2     PTT    Collection Time: 02/20/22  9:40 AM   Result Value Ref Range    aPTT 41.5 (H) 23.0 - 36.4 SEC   TROPONIN-HIGH SENSITIVITY    Collection Time: 02/20/22  9:40 AM   Result Value Ref Range    Troponin-High Sensitivity 7 0 - 54 ng/L   MAGNESIUM    Collection Time: 02/20/22  9:40 AM   Result Value Ref Range    Magnesium 1.8 1.6 - 2.6 mg/dL   LIPASE    Collection Time: 02/20/22  9:40 AM   Result Value Ref Range    Lipase 138 73 - 393 U/L   URINALYSIS W/ RFLX MICROSCOPIC    Collection Time: 02/20/22 10:50 AM   Result Value Ref Range    Color YELLOW      Appearance CLEAR      Specific gravity 1.021 1.005 - 1.030      pH (UA) 7.0 5.0 - 8.0      Protein 100 (A) NEG mg/dL    Glucose >1,000 (A) NEG mg/dL    Ketone TRACE (A) NEG mg/dL    Bilirubin Negative NEG      Blood MODERATE (A) NEG      Urobilinogen 1.0 0.2 - 1.0 EU/dL    Nitrites Negative NEG      Leukocyte Esterase Negative NEG     HCG URINE, QL    Collection Time: 02/20/22 10:50 AM   Result Value Ref Range    HCG urine, QL Negative NEG     URINE MICROSCOPIC ONLY    Collection Time: 02/20/22 10:50 AM   Result Value Ref Range    WBC Negative 0 - 4 /hpf    RBC Negative 0 - 5 /hpf    Epithelial cells FEW 0 - 5 /lpf    Bacteria Negative NEG /hpf   POC LACTIC ACID    Collection Time: 02/20/22 10:53 AM   Result Value Ref Range    Lactic Acid (POC) 5.25 (HH) 0.40 - 2.00 mmol/L   POC LACTIC ACID    Collection Time: 02/20/22  3:15 PM   Result Value Ref Range    Lactic Acid (POC) 4.36 (HH) 0.40 - 2.00 mmol/L   COVID-19 RAPID TEST    Collection Time: 02/20/22  4:32 PM   Result Value Ref Range    Specimen source Nasopharyngeal      COVID-19 rapid test Not detected NOTD             Assessment/Plan:   31 y.o. female with PMH breast cancer status post bilateral mastectomies with implant placement, most recently cholecystitis requiring cholecystectomy on 2/8/2022 presenting w/ syncope and abdominal pain. Severe sepsis  likely secondary to the left breast abscess per CT  Per surgery possible due to a hematoma after a fall vs infected implant   sinus tachycardia 140s-150s, lactic acidosis(~5), hyponatremia(128 baseline 137) VESTA (Cr 1.4 baseline 0.8), hyperglycemia (258)  Unclear why patient has left breast abscess since her breast surgery had been several months ago. Will rule out any abdominal source of infection since she recently had a gallbladder surgery about 10 days ago. - ICU was consulted recommended continuing to resuscitate patient with IV fluids. No need to transfer to ICU currently per ICU  Plan:  -Lactic acid from 5.25-4.36  -Status post 3 L of fluid and 250 cc/hr LR  -Status post cefepime and vancomycin  Plan  -Continue 150 cc/h of LR  -Given additional 1 L LR  -ED doc called the OR on the schedule to go to the OR at night as an add-on  -Ordered Zosyn, continue vancomycin  -Monitor for signs of fluid overload respiratory status. Patient is young no history of CHF. Ordered proBNP, ICU ordered echo.   Last echo looks like it is within normal limits  -Lactic acid every every 4 hours  -Follow-up on blood cultures urine cultures and pro-Mehran  -Ordered CT abdomen without contrast (due to VESTA)  -zofran 4mg IV prn (held po zofran)  -Admit to Step down w tele      #syncope+ fall possibly from orthostatic/ sepsis  EKG no ST changes trop X1 negative    Plan:  -Consider a CT head, if patient has persistent emesis or complains of HA  -May need imaging of /left hip if patient has continued pain    Breast cancer s/p bilateral mastectomy w implant  -consider reaching out to heme onc   -hold chemo po med capecitabine 500mg twice a day suppose to be on it starting 2/23      Asthma  - cont prn albuterol    Depression  -cont lexapro 10mg daily    Bladder spasms  - hold oxybutynin            Diet NPO for procedure   DVT Prophylaxis None, SCD for procedure   GI Prophylaxis None    Code status Full   Disposition TBD     Point of Contact Evangelista Lines  [phone] [address] PGY-2   [facility]      February 20, 2022, 4:18 PM

## 2022-02-20 NOTE — Clinical Note
Status[de-identified] INPATIENT [101]   Type of Bed: Stepdown [17]   Cardiac Monitoring Required?: Yes   Inpatient Hospitalization Certified Necessary for the Following Reasons: 3.  Patient receiving treatment that can only be provided in an inpatient setting (further clarification in H&P documentation)   Admitting Diagnosis: Sepsis [facility]) [[id number]]   Admitting Diagnosis: Breast abscess [[id number]]   Admitting Physician: Olya Upton   Attending Physician: Adriana Seaman [[id number]]   Estimated Length of Stay: 3-4 Midnights   Discharge Plan[de-identified] Home with Office Follow-up

## 2022-02-20 NOTE — CONSULTS
[address] [place] [address] [facility]  Pulmonary, Critical Care, and Sleep Medicine      Name: Chase Varela MRN: [id number]   : 1995 Hospital: [address] [place] [address] Dr   Date: 2022          Critical Care Initial Patient Consult    Requesting MD:   Dr. Chi Machado                                               Reason for CC Consult: Sepsis    IMPRESSION:   · Left breast infection/cellulitis - patient s/p bilateral mastectomies with implant placement (), now with erythema, swelling and tenderness. CT with large fluid collection around left breast  · Sepsis - secondary to above with tachycardia, leukocytosis and elevated LA. Procal pending  · Hyponatremia  · Hyperglycemia -   on admission, no hx of DM  · VESTA - creatinine 1.42  · History of breast Cancer - b/l mastectomies and completed chemo treatment  · History of cholecystectomy - ()  · History of asthma      RECOMMENDATIONS:   Resp - supplemental O2 as needed to maintain SpO2 > 92%, incentive spirometry  ID -  sepsis bundle per protocol,  Trend WBCs and temperature curve. Start vanc and zosyn, follow up cultures. Trend LA and procal  CVS - Monitor HD, MAP goal>65. Avoid rate controlling agents -- this is physiologic  Heme/Onc- Daily CBC. Monitor for s/o active bleeding. Metabolic - Daily BMP, mag, phos. Renal - Trend Renal Indices, UOP  Endocrine - Q6 glucoses, SSI / lantus if needed. Check HgbA1c  Neuro/ Pain/ Sedation - PRN pain medications  GI - PRN zofran for nausea vomiting, regular diet as tolerated    Recommend admission to stepdown, consider ICU transfer with worsening hemodynamics       Subjective/History: This patient has been seen and evaluated at the request of Dr. Chi Machado  for sepsis. Patient is a 31 y.o. female with PMH of asthma, breast cancer s/p bilateral mastectomies with implants (),and cholecystitis s/p cholecystectomy () who presented to the ED on 22 with complaints of syncope and left breast pain. Patient reported that she became lightheaded and \"passed out \" last night while making dinner. She states she  does not know how long she was unconscious for. The patient reports that when she woke up this morning she had pain and swelling to her left breast.  She also c/o right sided abdominal pain from the fall and a headache. On arrival to the ED, the patient was tachycardic with HR in the 140's and BP of 102/62. Lab workup was significant for leukocytosis, elevated LA ( 4.36), hyperglycemia, hyponatremia and VESTA. CTA was negative for PE but showed a large fluid collection in the left breast concerning for inflammation / infection. On evaluation the patient was alert and oriented x 3 , tachycardic, normotensive with SpO2 > 92% on RA, temp of 99.1 and normal RR. She denies SOB, chest pain,  dizziness, and headache but complains of left breast swelling and tenderness along with decreased appetite over the past 5 days. Past Medical History:   Diagnosis Date    Asthma     last attack years ago    Cancer [facility])     BREAST    Chronic pain of left thumb       Past Surgical History:   Procedure Laterality Date    HAND/FINGER SURGERY UNLISTED      left thumb    HX BREAST BIOPSY      HX BREAST RECONSTRUCTION Bilateral 8/26/2021    BILATERAL BREAST RECONSTRUCTION WITH TISSUE EXPANDERS & ADIPOFASCIAL FLAPS performed by Jonel Lees MD at [facility] [address] [place] [address]  2016    HX LAP CHOLECYSTECTOMY      HX MASTECTOMY Bilateral 8/26/2021    BILATERAL SKIN SPARING MASTECTOMY; LEFT SENTINEL NODE BIOPSY performed by Kaiser Purcell MD at [address] [facility]      Prior to Admission medications    Medication Sig Start Date End Date Taking? Authorizing Provider   traMADoL (Ultram) 50 mg tablet Take 1 Tablet by mouth every six (6) hours as needed for Pain for up to 3 days.  Max Daily Amount: 200 mg. 2/16/22 2/19/22  Lacey Tapia, PA-C   ibuprofen (MOTRIN) 600 mg tablet Take 1 Tablet by mouth every six (6) hours. 2/10/22   Jennifer Leblanc MD   OXYBUTYNIN CHLORIDE PO Take 5 mg by mouth daily. Provider, Historical   albuterol (PROVENTIL HFA, VENTOLIN HFA, PROAIR HFA) 90 mcg/actuation inhaler Take 1 Puff by inhalation every four (4) hours as needed for Wheezing. 18   Kandi Neves MD     Current Facility-Administered Medications   Medication Dose Route Frequency    vancomycin (VANCOCIN) 750 mg in 0.9% sodium chloride 250 mL (VIAL-MATE)  750 mg IntraVENous Q12H    lactated Ringers infusion  150 mL/hr IntraVENous CONTINUOUS    lactated ringers bolus infusion 1,000 mL  1,000 mL IntraVENous ONCE    [START ON 2022] piperacillin-tazobactam (ZOSYN) 3.375 g in 0.9% sodium chloride (MBP/ADV) 100 mL MBP  3.375 g IntraVENous Q8H     Allergies   Allergen Reactions    Percocet [Oxycodone-Acetaminophen] Swelling     Pt can not remember if her mom told her  percocet or penicillin allergy so she does not take either. patient reports tylenol makes her eyes swell. States she is not allergic to Percocet. She has never taken it.  Tylenol [Acetaminophen] Anaphylaxis    Penicillins Other (comments)     Pt stated had allergy as a child as per her mother.  Gadavist [Gadobutrol] Nausea Only and Other (comments)     CO NAUSEA AND HEADACHE POST INJECTION. Social History     Tobacco Use    Smoking status: Never Smoker    Smokeless tobacco: Never Used   Substance Use Topics    Alcohol use: Yes     Comment: OCASSIONAL      Family History   Problem Relation Age of Onset    Diabetes Mother     No Known Problems Father         Review of Systems:  A comprehensive review of systems was negative except for that written in the HPI.     Objective:   Vital Signs:    Visit Vitals  /75   Pulse (!) 145   Temp 99.7 °F (37.6 °C)   Resp 18   Ht 5' (1.524 m)   Wt 105.7 kg (233 lb)   SpO2 100%   BMI 45.50 kg/m²       O2 Device: None (Room air)       Temp (24hrs), Av.7 °F (37.6 °C), Min:99.7 °F (37.6 °C), Max:99.7 °F (37.6 °C)       Intake/Output:   Last shift:      No intake/output data recorded. Last 3 shifts: No intake/output data recorded. No intake or output data in the 24 hours ending 02/20/22 1804    Ventilator Settings:  Mode Rate Tidal Volume Pressure FiO2 PEEP                    Peak airway pressure:      Minute ventilation:        ARDS network Guidelines: Lung protective strategy and Pl pressure goals____________      Physical Exam:    General:  Alert, cooperative, no distress, appears stated age. Head:  Normocephalic, without obvious abnormality, atraumatic. Eyes:  Conjunctivae/corneas clear. PERRL, EOMs intact. Nose: Nares normal. Septum midline. Mucosa normal. No drainage or sinus tenderness. Throat: Lips, mucosa, and tongue normal. Teeth and gums normal.   Neck: Supple, symmetrical, trachea midline, no adenopathy, thyroid: no enlargment/tenderness/nodules, no carotid bruit and no JVD. Back:   Symmetric, no curvature. ROM normal.   Lungs:   Clear to auscultation bilaterally. Chest wall:  Bilateral mastectomies noted, left breast erythematous and tender to touch   Heart:  Sinus tachycardia, S1, S2 normal, no murmur, click, rub or gallop. Abdomen:   Soft, non-tender. Bowel sounds normal. No masses,  No organomegaly. Extremities: Extremities normal, atraumatic, no cyanosis or edema. Pulses: 2+ and symmetric all extremities. Skin: Skin color, texture, turgor normal. No rashes or lesions. Normal cap refill.     Lymph nodes: Cervical, supraclavicular, and axillary nodes normal.   Neurologic: Grossly nonfocal       Data:     Recent Results (from the past 24 hour(s))   EKG, 12 LEAD, INITIAL    Collection Time: 02/20/22  9:12 AM   Result Value Ref Range    Ventricular Rate 139 BPM    Atrial Rate 139 BPM    P-R Interval 132 ms    QRS Duration 62 ms    Q-T Interval 292 ms    QTC Calculation (Bezet) 444 ms    Calculated P Axis 68 degrees    Calculated R Axis 83 degrees    Calculated T Axis 32 degrees Diagnosis       Sinus tachycardia  Abnormal ECG  When compared with ECG of 05-JUL-2021 12:46,  Vent. rate has increased BY  66 BPM  T wave inversion less evident in Anterior leads  Confirmed by Torie Patricio MD, --- ([id number]) on 2/20/2022 12:06:18 PM     CBC WITH AUTOMATED DIFF    Collection Time: 02/20/22  9:40 AM   Result Value Ref Range    WBC 13.7 (H) 4.6 - 13.2 K/uL    RBC 3.86 (L) 4.20 - 5.30 M/uL    HGB 10.5 (L) 12.0 - 16.0 g/dL    HCT 33.2 (L) 35.0 - 45.0 %    MCV 86.0 78.0 - 100.0 FL    MCH 27.2 24.0 - 34.0 PG    MCHC 31.6 31.0 - 37.0 g/dL    RDW 19.0 (H) 11.6 - 14.5 %    PLATELET 742 (H) 384 - 420 K/uL    MPV 10.2 9.2 - 11.8 FL    NRBC 0.0 0  WBC    ABSOLUTE NRBC 0.00 0.00 - 0.01 K/uL    NEUTROPHILS 77 (H) 40 - 73 %    LYMPHOCYTES 11 (L) 21 - 52 %    MONOCYTES 12 (H) 3 - 10 %    EOSINOPHILS 0 0 - 5 %    BASOPHILS 0 0 - 2 %    IMMATURE GRANULOCYTES 0 %    ABS. NEUTROPHILS 10.6 (H) 1.8 - 8.0 K/UL    ABS. LYMPHOCYTES 1.5 0.9 - 3.6 K/UL    ABS. MONOCYTES 1.6 (H) 0.05 - 1.2 K/UL    ABS. EOSINOPHILS 0.0 0.0 - 0.4 K/UL    ABS. BASOPHILS 0.0 0.0 - 0.1 K/UL    ABS. IMM. GRANS. 0.0 K/UL    DF MANUAL      PLATELET COMMENTS Increased Platelets      RBC COMMENTS ANISOCYTOSIS  1+        RBC COMMENTS HYPOCHROMIA  1+        RBC COMMENTS MICROCYTOSIS  1+        WBC COMMENTS VACUOLATED POLYS     METABOLIC PANEL, COMPREHENSIVE    Collection Time: 02/20/22  9:40 AM   Result Value Ref Range    Sodium 128 (L) 136 - 145 mmol/L    Potassium 4.7 3.5 - 5.5 mmol/L    Chloride 96 (L) 100 - 111 mmol/L    CO2 22 21 - 32 mmol/L    Anion gap 10 3.0 - 18 mmol/L    Glucose 365 (H) 74 - 99 mg/dL    BUN 9 7.0 - 18 MG/DL    Creatinine 1.42 (H) 0.6 - 1.3 MG/DL    BUN/Creatinine ratio 6 (L) 12 - 20      GFR est AA 54 (L) >60 ml/min/1.73m2    GFR est non-AA 44 (L) >60 ml/min/1.73m2    Calcium 9.0 8.5 - 10.1 MG/DL    Bilirubin, total 1.7 (H) 0.2 - 1.0 MG/DL    ALT (SGPT) 29 13 - 56 U/L    AST (SGOT) 35 10 - 38 U/L    Alk.  phosphatase 128 (H) 45 - 117 U/L    Protein, total 7.4 6.4 - 8.2 g/dL    Albumin 2.6 (L) 3.4 - 5.0 g/dL    Globulin 4.8 (H) 2.0 - 4.0 g/dL    A-G Ratio 0.5 (L) 0.8 - 1.7     PROTHROMBIN TIME + INR    Collection Time: 02/20/22  9:40 AM   Result Value Ref Range    Prothrombin time 16.7 (H) 11.5 - 15.2 sec    INR 1.4 (H) 0.8 - 1.2     PTT    Collection Time: 02/20/22  9:40 AM   Result Value Ref Range    aPTT 41.5 (H) 23.0 - 36.4 SEC   TROPONIN-HIGH SENSITIVITY    Collection Time: 02/20/22  9:40 AM   Result Value Ref Range    Troponin-High Sensitivity 7 0 - 54 ng/L   MAGNESIUM    Collection Time: 02/20/22  9:40 AM   Result Value Ref Range    Magnesium 1.8 1.6 - 2.6 mg/dL   LIPASE    Collection Time: 02/20/22  9:40 AM   Result Value Ref Range    Lipase 138 73 - 393 U/L   URINALYSIS W/ RFLX MICROSCOPIC    Collection Time: 02/20/22 10:50 AM   Result Value Ref Range    Color YELLOW      Appearance CLEAR      Specific gravity 1.021 1.005 - 1.030      pH (UA) 7.0 5.0 - 8.0      Protein 100 (A) NEG mg/dL    Glucose >1,000 (A) NEG mg/dL    Ketone TRACE (A) NEG mg/dL    Bilirubin Negative NEG      Blood MODERATE (A) NEG      Urobilinogen 1.0 0.2 - 1.0 EU/dL    Nitrites Negative NEG      Leukocyte Esterase Negative NEG     HCG URINE, QL    Collection Time: 02/20/22 10:50 AM   Result Value Ref Range    HCG urine, QL Negative NEG     URINE MICROSCOPIC ONLY    Collection Time: 02/20/22 10:50 AM   Result Value Ref Range    WBC Negative 0 - 4 /hpf    RBC Negative 0 - 5 /hpf    Epithelial cells FEW 0 - 5 /lpf    Bacteria Negative NEG /hpf   POC LACTIC ACID    Collection Time: 02/20/22 10:53 AM   Result Value Ref Range    Lactic Acid (POC) 5.25 (HH) 0.40 - 2.00 mmol/L   POC LACTIC ACID    Collection Time: 02/20/22  3:15 PM   Result Value Ref Range    Lactic Acid (POC) 4.36 (HH) 0.40 - 2.00 mmol/L   COVID-19 RAPID TEST    Collection Time: 02/20/22  4:32 PM   Result Value Ref Range    Specimen source Nasopharyngeal      COVID-19 rapid test Not detected NOTD Telemetry: sinus tachycardia    Imaging:  I have personally reviewed the patients radiographs and have reviewed the reports: Total DONOVAN time:  39 minutes      Yeison Castro NP  02/20/22  Pulmonary, Critical Care Medicine  [address] [place] [address] [facility]           Attending Note:  I saw and evaluated the patient, performing all elements of service personally. I discussed the findings, assessment and plan with the NP and agree with the NP's findings and plan as documented in the NP's note above. All edits and changes made above or are mentioned in my attending note which was independently assessed as well as written. Total of 80 min critical care time spent at bedside (personally) during the course of care providing evaluation,management and care decisions and ordering appropriate treatment related to critical care problems exclusive of procedures. The reason for providing this level of medical care for this critically ill patient was due a critical illness that impaired one or more vital organ systems such that there was a high probability of imminent or life threatening deterioration in the patients condition. This care involved high complexity decision making to assess, manipulate, and support vital system functions, to treat this degree vital organ system failure and to prevent further life threatening deterioration of the patients condition. This time was independent of other practitioners. 59-year-old female with a past medical history of T2N1 left breast cancer on chemotherapy, recent admission for cholecystitis status post cholecystectomy, presented to [facility] after a syncopal episode at home. Patient reports that for the last 5 days she was nauseous and unable to take p.o., was mainly bedbound. Patient reports that when she stood up things would become dizzy, and yesterday, patient passed out.   Patien was found to be tachycardic, sinus tachycardia in the 140s 150s. Patient reported that she developed breast pain with induration over the last 5 days. Work-up in the ER included a CTA PE protocol despite patient having VESTA for syncopal work-up which showed left breast implant fluid collection likely infected with tissue stranding. Patient was started on broad-spectrum antibiotics and given 3 L. Patient remains tachycardic, consulted by family medicine service for additional work-up. Patient appears to have significant sepsis from infected left breast implant, I recommend CT abdomen pelvis since patient had recent admission for cholecystitis with recent cholecystectomy, however I suspect the most likely source is the left breast.  Since patient is young, and review of EKG shows that this is sinus tachycardia, I recommend pain control--patient reports allergic urticaria to Dilaudid, so patient dosed fentanyl in the ER; as well as additional IV fluids--bolused another liter of LR and started at 150 an hour. I also advised to switch broad-spectrum antibiotics to vancomycin and Zosyn to provide anaerobic coverage, and this may be deescalated based on culture results from the OR. Agree with rest surgery consultation and proceeding with I&D in the OR this evening. Obtain TTE routine to rule out cardiomyopathy. Avoid rate controlling agents -- this is physiologic. Trend lactate, may discontinue if lactic continues to trend down. NPO, provide antiemetics - dosed Zofran.   Can advance diet tomorrow morning as tolerated      aMrita Schreiber MD/MPH     Pulmonary, Critical Care Medicine  [facility]

## 2022-02-20 NOTE — ED PROVIDER NOTES
EMERGENCY DEPARTMENT HISTORY AND PHYSICAL EXAM    9:27 AM      Date: 2/20/2022  Patient Name: Dung Fitzgerald    History of Presenting Illness     Chief Complaint   Patient presents with    Abdominal Pain    Breast pain    Syncope         History Provided By: Patient   Location/Duration/Severity/Modifying factors   Patient is a 58-year-old female with a history of breast cancer status post bilateral mastectomies with implant placement, most recently cholecystitis requiring cholecystectomy on 2/8/2022 presents to the emergency department with increasing abdominal pain, and lightheadedness with a syncopal event today. Patient notes that approximately week ago she had fallen while she was at a grocery store trying to go to the bathroom. She landed onto her left side and has been having increasing pain since then. Patient was seen in the emergency department 4 days prior and had some test done and was told she was doing well. The patient says her symptoms have continued to worsen and she is not feeling well. Patient today says she got up and felt lightheaded and passed out. The patient has noted also over the last 3 days that her left breast is becoming more red and painful. She is not sure if it is related to the fall however she has noted that has been difficult to touch. The patient denies any calf tenderness or history of blood clots. The patient is no longer on chemotherapy and says she is taking pills for her breast cancer. The patient denies any drainage from her wounds or her breast.  Patient lives alone and said that she has not been able to eat or drink well for over a week and started to feel lightheaded because she is not been able to eat or drink nothing. Patient denies being a smoker, occasional drinker, denies any drug use. Patient denies working at this time.           PCP: Tatiana Santiago MD    Current Facility-Administered Medications   Medication Dose Route Frequency Provider Last Rate Last Admin    sodium chloride (NS) flush 5-10 mL  5-10 mL IntraVENous PRN Viv Cardona MD        cefepime (MAXIPIME) 2 g in 0.9% sodium chloride (MBP/ADV) 100 mL MBP  2 g IntraVENous Q12H Viv Cardona MD        vancomycin (VANCOCIN) 750 mg in 0.9% sodium chloride 250 mL (VIAL-MATE)  750 mg IntraVENous Q12H Viv Cardona MD         Current Outpatient Medications   Medication Sig Dispense Refill    ibuprofen (MOTRIN) 600 mg tablet Take 1 Tablet by mouth every six (6) hours. 28 Tablet 0    OXYBUTYNIN CHLORIDE PO Take 5 mg by mouth daily.  albuterol (PROVENTIL HFA, VENTOLIN HFA, PROAIR HFA) 90 mcg/actuation inhaler Take 1 Puff by inhalation every four (4) hours as needed for Wheezing. 1 Inhaler 0       Past History     Past Medical History:  Past Medical History:   Diagnosis Date    Asthma     last attack years ago    Cancer [facility])     BREAST    Chronic pain of left thumb        Past Surgical History:  Past Surgical History:   Procedure Laterality Date    HAND/FINGER SURGERY UNLISTED      left thumb    HX BREAST BIOPSY      HX BREAST RECONSTRUCTION Bilateral 8/26/2021    BILATERAL BREAST RECONSTRUCTION WITH TISSUE EXPANDERS & ADIPOFASCIAL FLAPS performed by Jonel Lees MD at [facility] OR     [address]  [address]    HX LAP CHOLECYSTECTOMY      HX MASTECTOMY Bilateral 8/26/2021    BILATERAL SKIN SPARING MASTECTOMY; LEFT SENTINEL NODE BIOPSY performed by Kaiser Purcell MD at [facility] OR       Family History:  Family History   Problem Relation Age of Onset    Diabetes Mother     No Known Problems Father        Social History:  Social History     Tobacco Use    Smoking status: Never Smoker    Smokeless tobacco: Never Used   Substance Use Topics    Alcohol use: Yes     Comment: OCASSIONAL    Drug use: Never       Allergies:   Allergies   Allergen Reactions    Percocet [Oxycodone-Acetaminophen] Swelling     Pt can not remember if her mom told her  percocet or penicillin allergy so she does not take either. patient reports tylenol makes her eyes swell. States she is not allergic to Percocet. She has never taken it.  Tylenol [Acetaminophen] Anaphylaxis    Penicillins Other (comments)     Pt stated had allergy as a child as per her mother.  Gadavist [Gadobutrol] Nausea Only and Other (comments)     CO NAUSEA AND HEADACHE POST INJECTION. Review of Systems       Review of Systems   Constitutional: Negative for activity change, fatigue and fever. HENT: Negative for congestion and rhinorrhea. Eyes: Negative for visual disturbance. Respiratory: Negative for shortness of breath. Cardiovascular: Positive for chest pain. Negative for palpitations. Gastrointestinal: Positive for abdominal pain and nausea. Negative for diarrhea and vomiting. Genitourinary: Negative for dysuria and hematuria. Musculoskeletal: Negative for back pain. Skin: Negative for rash. Neurological: Positive for syncope and light-headedness. Negative for dizziness and weakness. All other systems reviewed and are negative. Physical Exam     Visit Vitals  /76   Pulse (!) 132   Temp 99.7 °F (37.6 °C)   Resp 26   Ht 5' (1.524 m)   Wt 105.7 kg (233 lb)   SpO2 100%   BMI 45.50 kg/m²         Physical Exam  Vitals and nursing note reviewed. Constitutional:       General: She is not in acute distress. Appearance: She is well-developed. HENT:      Head: Normocephalic and atraumatic. Right Ear: External ear normal.      Left Ear: External ear normal.      Nose: Nose normal.   Eyes:      General: No scleral icterus. Conjunctiva/sclera: Conjunctivae normal.      Pupils: Pupils are equal, round, and reactive to light. Neck:      Thyroid: No thyromegaly. Vascular: No JVD. Trachea: No tracheal deviation. Cardiovascular:      Rate and Rhythm: Regular rhythm. Tachycardia present. Heart sounds: Normal heart sounds. No murmur heard.   No friction rub. No gallop. Pulmonary:      Effort: Pulmonary effort is normal.      Breath sounds: Normal breath sounds. Comments: Bilateral mastectomies noted, the left breast is tense, erythematous, and remarkably tender to the touch  Chest:      Chest wall: Tenderness present. Abdominal:      General: Bowel sounds are normal. There is no distension. Palpations: Abdomen is soft. Tenderness: There is no abdominal tenderness. There is no guarding or rebound. Comments: Port sites are clean dry and intact, mild right upper quadrant pain   Musculoskeletal:         General: No tenderness. Normal range of motion. Cervical back: Normal range of motion and neck supple. Lymphadenopathy:      Cervical: No cervical adenopathy. Skin:     General: Skin is warm and dry. Neurological:      Mental Status: She is alert and oriented to person, place, and time. Cranial Nerves: No cranial nerve deficit. Coordination: Coordination normal.      Comments: No sensory loss, Gait normal, Motor 5/5   Psychiatric:         Behavior: Behavior normal.         Thought Content: Thought content normal.         Judgment: Judgment normal.           Diagnostic Study Results     Labs -  Recent Results (from the past 12 hour(s))   EKG, 12 LEAD, INITIAL    Collection Time: 02/20/22  9:12 AM   Result Value Ref Range    Ventricular Rate 139 BPM    Atrial Rate 139 BPM    P-R Interval 132 ms    QRS Duration 62 ms    Q-T Interval 292 ms    QTC Calculation (Bezet) 444 ms    Calculated P Axis 68 degrees    Calculated R Axis 83 degrees    Calculated T Axis 32 degrees    Diagnosis       Sinus tachycardia  Abnormal ECG  When compared with ECG of 05-JUL-2021 12:46,  Vent.  rate has increased BY  66 BPM  T wave inversion less evident in Anterior leads  Confirmed by Jayson Xiao MD, --- ([id number]) on 2/20/2022 12:06:18 PM     CBC WITH AUTOMATED DIFF    Collection Time: 02/20/22  9:40 AM   Result Value Ref Range    WBC 13.7 (H) 4.6 - 13.2 K/uL    RBC 3.86 (L) 4.20 - 5.30 M/uL    HGB 10.5 (L) 12.0 - 16.0 g/dL    HCT 33.2 (L) 35.0 - 45.0 %    MCV 86.0 78.0 - 100.0 FL    MCH 27.2 24.0 - 34.0 PG    MCHC 31.6 31.0 - 37.0 g/dL    RDW 19.0 (H) 11.6 - 14.5 %    PLATELET 734 (H) 302 - 420 K/uL    MPV 10.2 9.2 - 11.8 FL    NRBC 0.0 0  WBC    ABSOLUTE NRBC 0.00 0.00 - 0.01 K/uL    NEUTROPHILS 77 (H) 40 - 73 %    LYMPHOCYTES 11 (L) 21 - 52 %    MONOCYTES 12 (H) 3 - 10 %    EOSINOPHILS 0 0 - 5 %    BASOPHILS 0 0 - 2 %    IMMATURE GRANULOCYTES 0 %    ABS. NEUTROPHILS 10.6 (H) 1.8 - 8.0 K/UL    ABS. LYMPHOCYTES 1.5 0.9 - 3.6 K/UL    ABS. MONOCYTES 1.6 (H) 0.05 - 1.2 K/UL    ABS. EOSINOPHILS 0.0 0.0 - 0.4 K/UL    ABS. BASOPHILS 0.0 0.0 - 0.1 K/UL    ABS. IMM. GRANS. 0.0 K/UL    DF MANUAL      PLATELET COMMENTS Increased Platelets      RBC COMMENTS ANISOCYTOSIS  1+        RBC COMMENTS HYPOCHROMIA  1+        RBC COMMENTS MICROCYTOSIS  1+        WBC COMMENTS VACUOLATED POLYS     METABOLIC PANEL, COMPREHENSIVE    Collection Time: 02/20/22  9:40 AM   Result Value Ref Range    Sodium 128 (L) 136 - 145 mmol/L    Potassium 4.7 3.5 - 5.5 mmol/L    Chloride 96 (L) 100 - 111 mmol/L    CO2 22 21 - 32 mmol/L    Anion gap 10 3.0 - 18 mmol/L    Glucose 365 (H) 74 - 99 mg/dL    BUN 9 7.0 - 18 MG/DL    Creatinine 1.42 (H) 0.6 - 1.3 MG/DL    BUN/Creatinine ratio 6 (L) 12 - 20      GFR est AA 54 (L) >60 ml/min/1.73m2    GFR est non-AA 44 (L) >60 ml/min/1.73m2    Calcium 9.0 8.5 - 10.1 MG/DL    Bilirubin, total 1.7 (H) 0.2 - 1.0 MG/DL    ALT (SGPT) 29 13 - 56 U/L    AST (SGOT) 35 10 - 38 U/L    Alk.  phosphatase 128 (H) 45 - 117 U/L    Protein, total 7.4 6.4 - 8.2 g/dL    Albumin 2.6 (L) 3.4 - 5.0 g/dL    Globulin 4.8 (H) 2.0 - 4.0 g/dL    A-G Ratio 0.5 (L) 0.8 - 1.7     PROTHROMBIN TIME + INR    Collection Time: 02/20/22  9:40 AM   Result Value Ref Range    Prothrombin time 16.7 (H) 11.5 - 15.2 sec    INR 1.4 (H) 0.8 - 1.2     PTT    Collection Time: 02/20/22  9:40 AM   Result Value Ref Range    aPTT 41.5 (H) 23.0 - 36.4 SEC   TROPONIN-HIGH SENSITIVITY    Collection Time: 02/20/22  9:40 AM   Result Value Ref Range    Troponin-High Sensitivity 7 0 - 54 ng/L   MAGNESIUM    Collection Time: 02/20/22  9:40 AM   Result Value Ref Range    Magnesium 1.8 1.6 - 2.6 mg/dL   URINALYSIS W/ RFLX MICROSCOPIC    Collection Time: 02/20/22 10:50 AM   Result Value Ref Range    Color YELLOW      Appearance CLEAR      Specific gravity 1.021 1.005 - 1.030      pH (UA) 7.0 5.0 - 8.0      Protein 100 (A) NEG mg/dL    Glucose >1,000 (A) NEG mg/dL    Ketone TRACE (A) NEG mg/dL    Bilirubin Negative NEG      Blood MODERATE (A) NEG      Urobilinogen 1.0 0.2 - 1.0 EU/dL    Nitrites Negative NEG      Leukocyte Esterase Negative NEG     HCG URINE, QL    Collection Time: 02/20/22 10:50 AM   Result Value Ref Range    HCG urine, QL Negative NEG     URINE MICROSCOPIC ONLY    Collection Time: 02/20/22 10:50 AM   Result Value Ref Range    WBC Negative 0 - 4 /hpf    RBC Negative 0 - 5 /hpf    Epithelial cells FEW 0 - 5 /lpf    Bacteria Negative NEG /hpf   POC LACTIC ACID    Collection Time: 02/20/22 10:53 AM   Result Value Ref Range    Lactic Acid (POC) 5.25 (HH) 0.40 - 2.00 mmol/L       Radiologic Studies -   CTA CHEST W OR W WO CONT   Final Result      No evidence of acute pulmonary embolism as described. Large fluid collection in the left breast around implant, associated with   stranding in the surrounding fat and overlying skin thickening. Abscess cannot   be excluded. Fluid drainage/aspiration might be helpful and breast   radiology/surgery referral for follow-up as discussed above. XR CHEST SNGL V   Final Result      No evidence of acute pulmonary disease. Medical Decision Making   I am the first provider for this patient. I reviewed the vital signs, available nursing notes, past medical history, past surgical history, family history and social history.     Vital Signs-Reviewed the patient's vital signs. EKG: Sinus tachycardia interpreted by me    Records Reviewed: Nursing Notes, Old Medical Records, Previous Radiology Studies and Previous Laboratory Studies (Time of Review: 9:27 AM)    ED Course: Progress Notes, Reevaluation, and Consults:     Patient has an elevated white count and elevated lactic acid and meets criteria for septic shock. The patient's been given IV antibiotics and is getting weight-based IV fluids. We will proceed with a CT of the chest as that left breast appears to be the source of the infection. We will continue to follow the patient closely. Sepsis reevaluation is complete. Justina Conner, DO 11:49 AM    The patient apparently has a large left breast abscess and will need source control. I discussed the case with the general surgery on-call and his a colorectal surgeon and will not be able to perform breast surgery. The patient would like Dr. Primitivo Muse to do her surgery so we will call plastic surgery from [facility] where she was can have her surgery. In addition we will call her breast surgeon Dr. Soco Mchugh to see if the services are available at [facility]. Justina Conner, DO 3:29 PM    I discussed the case with her plastic surgeon from [facility] he does not feel like the breast expander is a source for infection would like for more exploration of her abdomen is being the source. I discussed with him that clinically the breast is a source of the infection and needs to be addressed and says I can be done by general surgeon here [facility]. He does not feel the patient needs to be transferred especially in his condition to [facility] continue to discuss the case with general surgery but Dr. Primitivo Muse does not feel a plastic surgeon is needed at this time. Justina Conner DO 4:05 PM    I discussed the case with Dr. Soco Mchugh and will come to the hospital to evaluate the patient would like the patient to be admitted to the hospitalist service. Nitesh Willis DO 4:12 PM    I discussed the case with the [facility] team and will admit the patient. Provider Notes (Medical Decision Making):   MDM  Number of Diagnoses or Management Options  Diagnosis management comments: Patient is a 39-year-old female with a history of breast cancer, bilateral mastectomies, recent cholecystectomy due to cholecystitis, that returns to the emergency department with complaint of increasing lightheadedness and poor p.o. intake since falling approximately 1 week ago and hitting her abdomen. The patient was seen in the emergency department 4 days ago had a CT scan that did not show any acute process and was sent home. Patient has not been able to tolerate much by mouth since then and has been feeling lightheaded. The patient got up today and had a syncopal event and presents emergency department with tachycardia and just not feeling well. On my evaluation the patient's abdomen is soft however she has a tense erythematous left breast which is concerning for acute infection which may be causing her tachycardia and an her current state. Patient has had CT scans of her abdomen since the surgery and will focus on a CT of her chest to evaluate for thoracic process as well as start IV antibiotics as well as weight-based IV fluids. We'll follow the patient closely. Nitesh Willis DO 9:49 AM        Procedures    Critical Care Time: Critical Care Time:  The services I provided to this patient were to treat and/or prevent clinically significant deterioration that could result in the failure of one or more body systems and/or organ systems due to sepsis with breast infection:    Services included the following:  -reviewing nursing notes and old charts  -vital sign assessments  -direct patient care  -medication orders and management  -interpreting and reviewing diagnostic studies/labs  -re-evaluations  -documentation time    Aggregate critical care time was 90 minutes, which includes only time during which I was engaged in work directly related to the patient's care as described above, whether I was at bedside or elsewhere in the Emergency Department. It did not include time spent performing other reported procedures or the services of residents, students, nurses, or advance practice providers. Kwaku AguirreDO 7:24 AM        Diagnosis     Clinical Impression:   1. Sepsis, due to unspecified organism, unspecified whether acute organ dysfunction present ([facility]    2. Breast abrasion, left, initial encounter    3. Lactic acidosis    4. Leukocytosis, unspecified type    5. Abscess of left breast    6. Sinus tachycardia    7. Malignant neoplasm of left breast in female, estrogen receptor positive, unspecified site of breast ([facility]        Disposition: Admit     Follow-up Information    None          Patient's Medications   Start Taking    No medications on file   Continue Taking    ALBUTEROL (PROVENTIL HFA, VENTOLIN HFA, PROAIR HFA) 90 MCG/ACTUATION INHALER    Take 1 Puff by inhalation every four (4) hours as needed for Wheezing. IBUPROFEN (MOTRIN) 600 MG TABLET    Take 1 Tablet by mouth every six (6) hours. OXYBUTYNIN CHLORIDE PO    Take 5 mg by mouth daily. These Medications have changed    No medications on file   Stop Taking    No medications on file     Disclaimer: Sections of this note are dictated using utilizing voice recognition software. Minor typographical errors may be present. If questions arise, please do not hesitate to contact me or call our department.

## 2022-02-20 NOTE — PROGRESS NOTES
[facility] Pharmacokinetic Monitoring Service - Vancomycin     Ella Ragland is a 31 y.o. female starting on vancomycin therapy for SSTI. Pharmacy consulted by [facility] for monitoring and adjustment. Target Concentration: Goal AUC/MICHELLE 400-600 mg*hr/L    Additional Antimicrobials: Cefepime    Pertinent Laboratory Values: Wt Readings from Last 1 Encounters:   02/20/22 105.7 kg (233 lb)     Temp Readings from Last 1 Encounters:   02/20/22 99.7 °F (37.6 °C)     No components found for: PROCAL  Estimated Creatinine Clearance: 65.4 mL/min (A) (based on SCr of 1.42 mg/dL (H)). Recent Labs     02/20/22  0940   WBC 13.7*       Pertinent Cultures:  Culture Date Source Results   2/20 Blood Pending    MRSA Nasal Swab: N/A. Non-respiratory infection. .    Plan:  Dosing recommendations based on Bayesian software  Start vancomycin 2000 mg IV x1, followed by 750 mg IV q12h  Anticipated AUC of 596 and trough concentration of 19.7 at steady state  Renal labs as indicated   Vancomycin concentration ordered for 2/21 with AM labs  Pharmacy will continue to monitor patient and adjust therapy as indicated    Thank you for the consult,  FANNIE Hudson  2/20/2022

## 2022-02-20 NOTE — REMOTE MONITORING
Spoke with primary RN Evelyn regarding 3 and 6 hour Sepsis bundle.       [address]  Callback # [phone]

## 2022-02-20 NOTE — ED TRIAGE NOTES
Pt presents to ed via ems/bls on stretcher medic 4 from home with multiple complaints. Pt reports she has not eaten in 5 days and at 2330 she got up out of the recliner to go to the kitchen to make some chicken noodle soup and she passed out. Pt reports she was able to get up finished making her soup walked back to the recliner and tried to eat her soup. Pt reports this morning she is having a headache from the syncopal episode last night. Pt also reports left breast pain x 6 days and reports she tried to call her plastic surgeon yesterday swelling and warmth noted to left breast and breast is tender to touch. Pt also c/o abd pain x 4 days and reports she had a barrie on 02/08/22. Pt denies any other complaints at this time. Pt is a/ox4 and is in NAD.

## 2022-02-21 ENCOUNTER — APPOINTMENT (OUTPATIENT)
Dept: NON INVASIVE DIAGNOSTICS | Age: 27
DRG: 711 | End: 2022-02-21
Attending: STUDENT IN AN ORGANIZED HEALTH CARE EDUCATION/TRAINING PROGRAM
Payer: MEDICAID

## 2022-02-21 ENCOUNTER — APPOINTMENT (OUTPATIENT)
Dept: GENERAL RADIOLOGY | Age: 27
DRG: 711 | End: 2022-02-21
Attending: STUDENT IN AN ORGANIZED HEALTH CARE EDUCATION/TRAINING PROGRAM
Payer: MEDICAID

## 2022-02-21 LAB
ABO + RH BLD: NORMAL
ALBUMIN SERPL-MCNC: 1.7 G/DL (ref 3.4–5)
ALBUMIN/GLOB SERPL: 0.5 {RATIO} (ref 0.8–1.7)
ALP SERPL-CCNC: 87 U/L (ref 45–117)
ALT SERPL-CCNC: 16 U/L (ref 13–56)
ANION GAP SERPL CALC-SCNC: 7 MMOL/L (ref 3–18)
AST SERPL-CCNC: 7 U/L (ref 10–38)
BASOPHILS # BLD: 0.1 K/UL (ref 0–0.1)
BASOPHILS NFR BLD: 1 % (ref 0–2)
BILIRUB SERPL-MCNC: 0.7 MG/DL (ref 0.2–1)
BLOOD GROUP ANTIBODIES SERPL: NORMAL
BNP SERPL-MCNC: 381 PG/ML (ref 0–450)
BUN SERPL-MCNC: 7 MG/DL (ref 7–18)
BUN/CREAT SERPL: 7 (ref 12–20)
CALCIUM SERPL-MCNC: 8.1 MG/DL (ref 8.5–10.1)
CHLORIDE SERPL-SCNC: 107 MMOL/L (ref 100–111)
CO2 SERPL-SCNC: 25 MMOL/L (ref 21–32)
CREAT SERPL-MCNC: 0.95 MG/DL (ref 0.6–1.3)
DIFFERENTIAL METHOD BLD: ABNORMAL
ECHO AO ROOT DIAM: 2.7 CM
ECHO AO ROOT INDEX: 1.37 CM/M2
ECHO LV FRACTIONAL SHORTENING: 33 % (ref 28–44)
ECHO LV INTERNAL DIMENSION DIASTOLE INDEX: 2.28 CM/M2
ECHO LV INTERNAL DIMENSION DIASTOLIC: 4.5 CM (ref 3.9–5.3)
ECHO LV INTERNAL DIMENSION SYSTOLIC INDEX: 1.52 CM/M2
ECHO LV INTERNAL DIMENSION SYSTOLIC: 3 CM
ECHO LV IVSD: 0.7 CM (ref 0.6–0.9)
ECHO LV MASS 2D: 113.6 G (ref 67–162)
ECHO LV MASS INDEX 2D: 57.7 G/M2 (ref 43–95)
ECHO LV POSTERIOR WALL DIASTOLIC: 0.9 CM (ref 0.6–0.9)
ECHO LV RELATIVE WALL THICKNESS RATIO: 0.4
ECHO LVOT AREA: 3.1 CM2
ECHO LVOT DIAM: 2 CM
EOSINOPHIL # BLD: 0.5 K/UL (ref 0–0.4)
EOSINOPHIL NFR BLD: 5 % (ref 0–5)
ERYTHROCYTE [DISTWIDTH] IN BLOOD BY AUTOMATED COUNT: 19.7 % (ref 11.6–14.5)
GLOBULIN SER CALC-MCNC: 3.5 G/DL (ref 2–4)
GLUCOSE BLD STRIP.AUTO-MCNC: 105 MG/DL (ref 70–110)
GLUCOSE BLD STRIP.AUTO-MCNC: 110 MG/DL (ref 70–110)
GLUCOSE BLD STRIP.AUTO-MCNC: 186 MG/DL (ref 70–110)
GLUCOSE BLD STRIP.AUTO-MCNC: 264 MG/DL (ref 70–110)
GLUCOSE SERPL-MCNC: 150 MG/DL (ref 74–99)
HCT VFR BLD AUTO: 24.8 % (ref 35–45)
HGB BLD-MCNC: 8 G/DL (ref 12–16)
IMM GRANULOCYTES # BLD AUTO: 0 K/UL (ref 0–0.04)
IMM GRANULOCYTES NFR BLD AUTO: 0 % (ref 0–0.5)
LACTATE SERPL-SCNC: 2.4 MMOL/L (ref 0.4–2)
LACTATE SERPL-SCNC: 3 MMOL/L (ref 0.4–2)
LYMPHOCYTES # BLD: 2.5 K/UL (ref 0.9–3.6)
LYMPHOCYTES NFR BLD: 23 % (ref 21–52)
MCH RBC QN AUTO: 28.4 PG (ref 24–34)
MCHC RBC AUTO-ENTMCNC: 32.3 G/DL (ref 31–37)
MCV RBC AUTO: 87.9 FL (ref 78–100)
METAMYELOCYTES NFR BLD MANUAL: 2 %
MONOCYTES # BLD: 1.3 K/UL (ref 0.05–1.2)
MONOCYTES NFR BLD: 12 % (ref 3–10)
NEUTS BAND NFR BLD MANUAL: 14 %
NEUTS SEG # BLD: 6.2 K/UL (ref 1.8–8)
NEUTS SEG NFR BLD: 43 % (ref 40–73)
NRBC # BLD: 0 K/UL (ref 0–0.01)
NRBC BLD-RTO: 0 PER 100 WBC
PLATELET # BLD AUTO: 296 K/UL (ref 135–420)
PLATELET COMMENTS,PCOM: ABNORMAL
PMV BLD AUTO: 9.8 FL (ref 9.2–11.8)
POTASSIUM SERPL-SCNC: 4.1 MMOL/L (ref 3.5–5.5)
PROT SERPL-MCNC: 5.2 G/DL (ref 6.4–8.2)
RBC # BLD AUTO: 2.82 M/UL (ref 4.2–5.3)
RBC MORPH BLD: ABNORMAL
SODIUM SERPL-SCNC: 139 MMOL/L (ref 136–145)
SPECIMEN EXP DATE BLD: NORMAL
VANCOMYCIN SERPL-MCNC: 13.2 UG/ML (ref 5–40)
WBC # BLD AUTO: 10.8 K/UL (ref 4.6–13.2)
WBC MORPH BLD: ABNORMAL

## 2022-02-21 PROCEDURE — 2709999900 HC NON-CHARGEABLE SUPPLY

## 2022-02-21 PROCEDURE — 86900 BLOOD TYPING SEROLOGIC ABO: CPT

## 2022-02-21 PROCEDURE — 83605 ASSAY OF LACTIC ACID: CPT

## 2022-02-21 PROCEDURE — 97535 SELF CARE MNGMENT TRAINING: CPT

## 2022-02-21 PROCEDURE — 85025 COMPLETE CBC W/AUTO DIFF WBC: CPT

## 2022-02-21 PROCEDURE — 74011636637 HC RX REV CODE- 636/637: Performed by: FAMILY MEDICINE

## 2022-02-21 PROCEDURE — 97116 GAIT TRAINING THERAPY: CPT

## 2022-02-21 PROCEDURE — 65270000029 HC RM PRIVATE

## 2022-02-21 PROCEDURE — 74011636637 HC RX REV CODE- 636/637: Performed by: STUDENT IN AN ORGANIZED HEALTH CARE EDUCATION/TRAINING PROGRAM

## 2022-02-21 PROCEDURE — 74011250636 HC RX REV CODE- 250/636: Performed by: FAMILY MEDICINE

## 2022-02-21 PROCEDURE — 93308 TTE F-UP OR LMTD: CPT

## 2022-02-21 PROCEDURE — 97161 PT EVAL LOW COMPLEX 20 MIN: CPT

## 2022-02-21 PROCEDURE — 82962 GLUCOSE BLOOD TEST: CPT

## 2022-02-21 PROCEDURE — 97166 OT EVAL MOD COMPLEX 45 MIN: CPT

## 2022-02-21 PROCEDURE — 80053 COMPREHEN METABOLIC PANEL: CPT

## 2022-02-21 PROCEDURE — 71045 X-RAY EXAM CHEST 1 VIEW: CPT

## 2022-02-21 PROCEDURE — 77030038269 HC DRN EXT URIN PURWCK BARD -A

## 2022-02-21 PROCEDURE — 80202 ASSAY OF VANCOMYCIN: CPT

## 2022-02-21 PROCEDURE — 74011000258 HC RX REV CODE- 258: Performed by: STUDENT IN AN ORGANIZED HEALTH CARE EDUCATION/TRAINING PROGRAM

## 2022-02-21 PROCEDURE — 74011250636 HC RX REV CODE- 250/636: Performed by: STUDENT IN AN ORGANIZED HEALTH CARE EDUCATION/TRAINING PROGRAM

## 2022-02-21 PROCEDURE — 74011250636 HC RX REV CODE- 250/636

## 2022-02-21 PROCEDURE — 83880 ASSAY OF NATRIURETIC PEPTIDE: CPT

## 2022-02-21 PROCEDURE — 74011000250 HC RX REV CODE- 250: Performed by: STUDENT IN AN ORGANIZED HEALTH CARE EDUCATION/TRAINING PROGRAM

## 2022-02-21 PROCEDURE — 74011250637 HC RX REV CODE- 250/637: Performed by: STUDENT IN AN ORGANIZED HEALTH CARE EDUCATION/TRAINING PROGRAM

## 2022-02-21 PROCEDURE — 36415 COLL VENOUS BLD VENIPUNCTURE: CPT

## 2022-02-21 RX ORDER — INSULIN LISPRO 100 [IU]/ML
4 INJECTION, SOLUTION INTRAVENOUS; SUBCUTANEOUS ONCE
Status: COMPLETED | OUTPATIENT
Start: 2022-02-21 | End: 2022-02-21

## 2022-02-21 RX ORDER — HEPARIN SODIUM 5000 [USP'U]/ML
5000 INJECTION, SOLUTION INTRAVENOUS; SUBCUTANEOUS EVERY 8 HOURS
Status: DISCONTINUED | OUTPATIENT
Start: 2022-02-21 | End: 2022-02-25 | Stop reason: HOSPADM

## 2022-02-21 RX ORDER — IBUPROFEN 600 MG/1
600 TABLET ORAL
Status: DISCONTINUED | OUTPATIENT
Start: 2022-02-21 | End: 2022-02-25 | Stop reason: HOSPADM

## 2022-02-21 RX ORDER — INSULIN LISPRO 100 [IU]/ML
INJECTION, SOLUTION INTRAVENOUS; SUBCUTANEOUS
Status: DISCONTINUED | OUTPATIENT
Start: 2022-02-21 | End: 2022-02-22

## 2022-02-21 RX ORDER — INSULIN GLARGINE 100 [IU]/ML
5 INJECTION, SOLUTION SUBCUTANEOUS
Status: DISCONTINUED | OUTPATIENT
Start: 2022-02-21 | End: 2022-02-22

## 2022-02-21 RX ORDER — VANCOMYCIN/0.9 % SOD CHLORIDE 1.5G/250ML
1500 PLASTIC BAG, INJECTION (ML) INTRAVENOUS
Status: DISCONTINUED | OUTPATIENT
Start: 2022-02-21 | End: 2022-02-25 | Stop reason: HOSPADM

## 2022-02-21 RX ADMIN — Medication 5 UNITS: at 01:12

## 2022-02-21 RX ADMIN — PIPERACILLIN AND TAZOBACTAM 3.38 G: 3; .375 INJECTION, POWDER, LYOPHILIZED, FOR SOLUTION INTRAVENOUS at 09:00

## 2022-02-21 RX ADMIN — HEPARIN SODIUM 5000 UNITS: 5000 INJECTION INTRAVENOUS; SUBCUTANEOUS at 17:36

## 2022-02-21 RX ADMIN — PIPERACILLIN AND TAZOBACTAM 3.38 G: 3; .375 INJECTION, POWDER, LYOPHILIZED, FOR SOLUTION INTRAVENOUS at 02:21

## 2022-02-21 RX ADMIN — ESCITALOPRAM OXALATE 10 MG: 10 TABLET ORAL at 09:00

## 2022-02-21 RX ADMIN — VANCOMYCIN HYDROCHLORIDE 1500 MG: 10 INJECTION, POWDER, LYOPHILIZED, FOR SOLUTION INTRAVENOUS at 22:32

## 2022-02-21 RX ADMIN — Medication 4 UNITS: at 01:13

## 2022-02-21 RX ADMIN — CETIRIZINE HYDROCHLORIDE 10 MG: 10 TABLET, FILM COATED ORAL at 09:00

## 2022-02-21 RX ADMIN — INSULIN LISPRO 3 UNITS: 100 INJECTION, SOLUTION INTRAVENOUS; SUBCUTANEOUS at 13:02

## 2022-02-21 RX ADMIN — VANCOMYCIN HYDROCHLORIDE 750 MG: 750 INJECTION, POWDER, LYOPHILIZED, FOR SOLUTION INTRAVENOUS at 09:51

## 2022-02-21 RX ADMIN — HEPARIN SODIUM 5000 UNITS: 5000 INJECTION INTRAVENOUS; SUBCUTANEOUS at 01:13

## 2022-02-21 RX ADMIN — SODIUM CHLORIDE, SODIUM LACTATE, POTASSIUM CHLORIDE, AND CALCIUM CHLORIDE 150 ML/HR: 600; 310; 30; 20 INJECTION, SOLUTION INTRAVENOUS at 17:34

## 2022-02-21 RX ADMIN — HEPARIN SODIUM 5000 UNITS: 5000 INJECTION INTRAVENOUS; SUBCUTANEOUS at 08:54

## 2022-02-21 RX ADMIN — SODIUM CHLORIDE, POTASSIUM CHLORIDE, SODIUM LACTATE AND CALCIUM CHLORIDE 500 ML: 600; 310; 30; 20 INJECTION, SOLUTION INTRAVENOUS at 05:30

## 2022-02-21 RX ADMIN — SODIUM CHLORIDE, PRESERVATIVE FREE 10 ML: 5 INJECTION INTRAVENOUS at 13:02

## 2022-02-21 RX ADMIN — IBUPROFEN 600 MG: 400 TABLET, FILM COATED ORAL at 13:40

## 2022-02-21 RX ADMIN — SODIUM CHLORIDE, PRESERVATIVE FREE 10 ML: 5 INJECTION INTRAVENOUS at 05:43

## 2022-02-21 RX ADMIN — SODIUM CHLORIDE, PRESERVATIVE FREE 10 ML: 5 INJECTION INTRAVENOUS at 21:20

## 2022-02-21 RX ADMIN — PIPERACILLIN AND TAZOBACTAM 3.38 G: 3; .375 INJECTION, POWDER, LYOPHILIZED, FOR SOLUTION INTRAVENOUS at 17:35

## 2022-02-21 RX ADMIN — SODIUM CHLORIDE, POTASSIUM CHLORIDE, SODIUM LACTATE AND CALCIUM CHLORIDE 500 ML: 600; 310; 30; 20 INJECTION, SOLUTION INTRAVENOUS at 07:00

## 2022-02-21 NOTE — PROGRESS NOTES
Spoke with Dr Delphine Garcia: Heme oncologist no chemo med ( has not been on med for several weeks). Pt has an appointment in early march.  No additional recs          Jose Byrne PGY-1   [address]   Senior Pager: [phone]   February 21, 2022, 4:51 PM

## 2022-02-21 NOTE — PROGRESS NOTES
conducted an initial consultation and Spiritual Assessment for Fransisca Gardner, who is a 31 y.o.,female. Patients Primary Language is: [place]. According to the patients EMR Mu-ism Affiliation is: Bharati Garcia. The reason the Patient came to the hospital is:   Patient Active Problem List    Diagnosis Date Noted    Breast abscess 02/20/2022    Sepsis ([facility] 02/20/2022    Abscess of left breast 02/20/2022    Cholecystitis 02/06/2022    [facility] 08/26/2021    Felon of finger of right hand 07/05/2021    Cellulitis and abscess of hand 07/05/2021    Pregnancy 07/30/2016        The  provided the following Interventions:  Initiated a relationship of care and support with patient in room 38821 [phone] today. Listened empathically as patient through a very soft spoken voice shared  Her story of being here and how she was feeling now. Patient seems very tired at present. There is no advance directive present. Provided information about Spiritual Care Services. Offered prayer and assurance of continued prayers on patients behalf. The following outcomes were achieved:  Patient shared limited information about her medical narrative and spiritual journey/beliefs. Patient expressed gratitude for pastoral care visit. Assessment:  Patient does not have any Mu-ism/cultural needs that will affect patients preferences in health care. There are no further spiritual or Mu-ism issues which require Spiritual Care Services interventions at this time. Plan:  Chaplains will continue to follow and will provide pastoral care on an as needed/requested basis    . Gin Rivera   Spiritual Care   [phone]

## 2022-02-21 NOTE — ANESTHESIA PREPROCEDURE EVALUATION
Relevant Problems   PERSONAL HX & FAMILY HX OF CANCER   (+) Breast cancer ([facility]       Anesthetic History   No history of anesthetic complications            Review of Systems / Medical History  Patient summary reviewed, nursing notes reviewed and pertinent labs reviewed    Pulmonary            Asthma : well controlled       Neuro/Psych   Within defined limits           Cardiovascular  Within defined limits                     GI/Hepatic/Renal  Within defined limits              Endo/Other  Within defined limits           Other Findings   Comments:  Tolerated prior geta for b/l mastectomy, and robot barrie           Physical Exam    Airway  Mallampati: II  TM Distance: 4 - 6 cm  Neck ROM: normal range of motion   Mouth opening: Normal     Cardiovascular    Rhythm: regular  Rate: normal         Dental  No notable dental hx       Pulmonary  Breath sounds clear to auscultation               Abdominal  GI exam deferred       Other Findings            Anesthetic Plan    ASA: 2  Anesthesia type: general          Induction: Intravenous  Anesthetic plan and risks discussed with: Patient

## 2022-02-21 NOTE — PROGRESS NOTES
Intern Progress Note  [facility]       Patient: Antionette Foreman MRN: [id number]  CSN: [id number]    YOB: 1995  Age: 31 y.o. Sex: female    DOA: 2/20/2022 LOS:  LOS: 1 day                    Subjective:        Acute events: Pt states doing much better than when she came into the hospital this AM s/p removal of tissue expander of L breast. PT states she only has some mild soreness on the lateral left edge of the incision site under the bandage, but only on palpation, none at rest. Pt states her SOB has resolved this AM and is sating well on 0L though the NC is still in her nose. Pt confirmed she does have allergy to Tylenol in which her tongue will itch and throat will swell and an allergy to Dilaudid in which she will break out in hives. ROS Pt denies any fevers, CP, SOB, or pain this morning other than previously stated.            Objective:     Patient Vitals for the past 12 hrs:   Temp Pulse Resp BP SpO2   02/21/22 0808 97.6 °F (36.4 °C) 98 22 109/65 100 %   02/21/22 0644 98.1 °F (36.7 °C) 94 23 (!) 94/56 99 %   02/21/22 0600 98.1 °F (36.7 °C) 89 21 (!) 92/50 100 %   02/21/22 0520 97.8 °F (36.6 °C) 91 23 (!) 83/51 100 %   02/21/22 0330 98.4 °F (36.9 °C) (!) 105 23 (!) 98/51 100 %   02/21/22 0118 -- (!) 114 20 (!) 94/50 100 %   02/21/22 0002 -- (!) 117 -- (!) 96/53 --   02/20/22 2341 98.5 °F (36.9 °C) (!) 114 29 (!) 99/45 100 %   02/20/22 2210 98.9 °F (37.2 °C) (!) 125 23 103/61 100 %   02/20/22 2148 -- (!) 128 21 -- 100 %   02/20/22 2146 -- (!) 132 19 121/83 100 %   02/20/22 2136 -- (!) 131 22 112/69 100 %   02/20/22 2133 99.9 °F (37.7 °C) (!) 128 20 -- 100 %   02/20/22 2130 -- (!) 129 18 -- 100 %   02/20/22 2127 -- (!) 130 18 -- 100 %   02/20/22 2126 -- (!) 131 19 107/60 100 %   02/20/22 2119 -- (!) 131 15 -- 100 %   02/20/22 2116 -- (!) 130 15 123/73 100 %   02/20/22 2106 -- (!) 134 27 (!) 132/45 100 %   02/20/22 2056 100.4 °F (38 °C) (!) 140 30 111/68 100 % Intake/Output Summary (Last 24 hours) at 2/21/2022 0420  Last data filed at 2/21/2022 0644  Gross per 24 hour   Intake --   Output 80 ml   Net -80 ml       Physical Exam:   General: well-appearing, NAD, AOx3  CV:  RRR, no M/G/R  RESP: Unlabored breathing. Lungs CTAB, no wheezes, rales or rhonchi appreciated. Equal expansion bilaterally. ABD:  Soft, nontender, nondistended. No hepatosplenomegaly. MS:  No joint deformity or instability. No atrophy. Ext:  No edema. 2+ radial and dp pulses bilaterally. Skin: Warm & dry. L chest wall bandage C/D/I, Healing rash on R sided chest from hives pt got after receiving dilaudid per pt. Psych: normal mood and affect     Lab/Data Reviewed:  Recent Results (from the past 24 hour(s))   EKG, 12 LEAD, INITIAL    Collection Time: 02/20/22  9:12 AM   Result Value Ref Range    Ventricular Rate 139 BPM    Atrial Rate 139 BPM    P-R Interval 132 ms    QRS Duration 62 ms    Q-T Interval 292 ms    QTC Calculation (Bezet) 444 ms    Calculated P Axis 68 degrees    Calculated R Axis 83 degrees    Calculated T Axis 32 degrees    Diagnosis       Sinus tachycardia  Abnormal ECG  When compared with ECG of 05-JUL-2021 12:46,  Vent.  rate has increased BY  66 BPM  T wave inversion less evident in Anterior leads  Confirmed by Tiny Salter MD, --- ([id number]) on 2/20/2022 12:06:18 PM     CBC WITH AUTOMATED DIFF    Collection Time: 02/20/22  9:40 AM   Result Value Ref Range    WBC 13.7 (H) 4.6 - 13.2 K/uL    RBC 3.86 (L) 4.20 - 5.30 M/uL    HGB 10.5 (L) 12.0 - 16.0 g/dL    HCT 33.2 (L) 35.0 - 45.0 %    MCV 86.0 78.0 - 100.0 FL    MCH 27.2 24.0 - 34.0 PG    MCHC 31.6 31.0 - 37.0 g/dL    RDW 19.0 (H) 11.6 - 14.5 %    PLATELET 570 (H) 353 - 420 K/uL    MPV 10.2 9.2 - 11.8 FL    NRBC 0.0 0  WBC    ABSOLUTE NRBC 0.00 0.00 - 0.01 K/uL    NEUTROPHILS 77 (H) 40 - 73 %    LYMPHOCYTES 11 (L) 21 - 52 %    MONOCYTES 12 (H) 3 - 10 %    EOSINOPHILS 0 0 - 5 %    BASOPHILS 0 0 - 2 %    IMMATURE GRANULOCYTES 0 %    ABS. NEUTROPHILS 10.6 (H) 1.8 - 8.0 K/UL    ABS. LYMPHOCYTES 1.5 0.9 - 3.6 K/UL    ABS. MONOCYTES 1.6 (H) 0.05 - 1.2 K/UL    ABS. EOSINOPHILS 0.0 0.0 - 0.4 K/UL    ABS. BASOPHILS 0.0 0.0 - 0.1 K/UL    ABS. IMM. GRANS. 0.0 K/UL    DF MANUAL      PLATELET COMMENTS Increased Platelets      RBC COMMENTS ANISOCYTOSIS  1+        RBC COMMENTS HYPOCHROMIA  1+        RBC COMMENTS MICROCYTOSIS  1+        WBC COMMENTS VACUOLATED POLYS     METABOLIC PANEL, COMPREHENSIVE    Collection Time: 02/20/22  9:40 AM   Result Value Ref Range    Sodium 128 (L) 136 - 145 mmol/L    Potassium 4.7 3.5 - 5.5 mmol/L    Chloride 96 (L) 100 - 111 mmol/L    CO2 22 21 - 32 mmol/L    Anion gap 10 3.0 - 18 mmol/L    Glucose 365 (H) 74 - 99 mg/dL    BUN 9 7.0 - 18 MG/DL    Creatinine 1.42 (H) 0.6 - 1.3 MG/DL    BUN/Creatinine ratio 6 (L) 12 - 20      GFR est AA 54 (L) >60 ml/min/1.73m2    GFR est non-AA 44 (L) >60 ml/min/1.73m2    Calcium 9.0 8.5 - 10.1 MG/DL    Bilirubin, total 1.7 (H) 0.2 - 1.0 MG/DL    ALT (SGPT) 29 13 - 56 U/L    AST (SGOT) 35 10 - 38 U/L    Alk.  phosphatase 128 (H) 45 - 117 U/L    Protein, total 7.4 6.4 - 8.2 g/dL    Albumin 2.6 (L) 3.4 - 5.0 g/dL    Globulin 4.8 (H) 2.0 - 4.0 g/dL    A-G Ratio 0.5 (L) 0.8 - 1.7     PROTHROMBIN TIME + INR    Collection Time: 02/20/22  9:40 AM   Result Value Ref Range    Prothrombin time 16.7 (H) 11.5 - 15.2 sec    INR 1.4 (H) 0.8 - 1.2     PTT    Collection Time: 02/20/22  9:40 AM   Result Value Ref Range    aPTT 41.5 (H) 23.0 - 36.4 SEC   TROPONIN-HIGH SENSITIVITY    Collection Time: 02/20/22  9:40 AM   Result Value Ref Range    Troponin-High Sensitivity 7 0 - 54 ng/L   MAGNESIUM    Collection Time: 02/20/22  9:40 AM   Result Value Ref Range    Magnesium 1.8 1.6 - 2.6 mg/dL   CULTURE, BLOOD    Collection Time: 02/20/22  9:40 AM    Specimen: Blood   Result Value Ref Range    Special Requests: NO SPECIAL REQUESTS      Culture result: NO GROWTH AFTER 20 HOURS     LIPASE Collection Time: 02/20/22  9:40 AM   Result Value Ref Range    Lipase 138 73 - 393 U/L   PROCALCITONIN    Collection Time: 02/20/22  9:40 AM   Result Value Ref Range    Procalcitonin 7.29 ng/mL   HEMOGLOBIN A1C WITH EAG    Collection Time: 02/20/22  9:40 AM   Result Value Ref Range    Hemoglobin A1c 6.1 (H) 4.2 - 5.6 %    Est. average glucose 128 mg/dL   NT-PRO BNP    Collection Time: 02/20/22  9:40 AM   Result Value Ref Range    NT pro- 0 - 450 PG/ML   CULTURE, BLOOD    Collection Time: 02/20/22 10:48 AM    Specimen: Blood   Result Value Ref Range    Special Requests: NO SPECIAL REQUESTS      Culture result: NO GROWTH AFTER 18 HOURS     URINALYSIS W/ RFLX MICROSCOPIC    Collection Time: 02/20/22 10:50 AM   Result Value Ref Range    Color YELLOW      Appearance CLEAR      Specific gravity 1.021 1.005 - 1.030      pH (UA) 7.0 5.0 - 8.0      Protein 100 (A) NEG mg/dL    Glucose >1,000 (A) NEG mg/dL    Ketone TRACE (A) NEG mg/dL    Bilirubin Negative NEG      Blood MODERATE (A) NEG      Urobilinogen 1.0 0.2 - 1.0 EU/dL    Nitrites Negative NEG      Leukocyte Esterase Negative NEG     HCG URINE, QL    Collection Time: 02/20/22 10:50 AM   Result Value Ref Range    HCG urine, QL Negative NEG     URINE MICROSCOPIC ONLY    Collection Time: 02/20/22 10:50 AM   Result Value Ref Range    WBC Negative 0 - 4 /hpf    RBC Negative 0 - 5 /hpf    Epithelial cells FEW 0 - 5 /lpf    Bacteria Negative NEG /hpf   POC LACTIC ACID    Collection Time: 02/20/22 10:53 AM   Result Value Ref Range    Lactic Acid (POC) 5.25 (HH) 0.40 - 2.00 mmol/L   POC LACTIC ACID    Collection Time: 02/20/22  3:15 PM   Result Value Ref Range    Lactic Acid (POC) 4.36 (HH) 0.40 - 2.00 mmol/L   COVID-19 RAPID TEST    Collection Time: 02/20/22  4:32 PM   Result Value Ref Range    Specimen source Nasopharyngeal      COVID-19 rapid test Not detected NOTD     GLUCOSE, POC    Collection Time: 02/20/22  8:03 PM   Result Value Ref Range    Glucose (POC) 258 (H) 70 - 110 mg/dL   GLUCOSE, POC    Collection Time: 02/20/22  9:02 PM   Result Value Ref Range    Glucose (POC) 273 (H) 70 - 110 mg/dL   CULTURE, WOUND W GRAM STAIN    Collection Time: 02/20/22  9:19 PM    Specimen: Breast   Result Value Ref Range    Special Requests: LEFT  BREAST        GRAM STAIN NO WBC'S SEEN      GRAM STAIN FEW GRAM POSITIVE COCCI IN PAIRS      Culture result: PENDING    GLUCOSE, POC    Collection Time: 02/21/22 12:16 AM   Result Value Ref Range    Glucose (POC) 264 (H) 70 - 110 mg/dL   TYPE & SCREEN    Collection Time: 02/21/22 12:25 AM   Result Value Ref Range    Crossmatch Expiration 02/24/2022,2359     ABO/Rh(D) [employer] POSITIVE     Antibody screen NEG    LACTIC ACID    Collection Time: 02/21/22 12:25 AM   Result Value Ref Range    Lactic acid 3.0 (HH) 0.4 - 2.0 MMOL/L   VANCOMYCIN, RANDOM    Collection Time: 02/21/22  7:10 AM   Result Value Ref Range    Vancomycin, random 13.2 5.0 - 40.0 UG/ML   CBC WITH AUTOMATED DIFF    Collection Time: 02/21/22  7:10 AM   Result Value Ref Range    WBC 10.8 4.6 - 13.2 K/uL    RBC 2.82 (L) 4.20 - 5.30 M/uL    HGB 8.0 (L) 12.0 - 16.0 g/dL    HCT 24.8 (L) 35.0 - 45.0 %    MCV 87.9 78.0 - 100.0 FL    MCH 28.4 24.0 - 34.0 PG    MCHC 32.3 31.0 - 37.0 g/dL    RDW 19.7 (H) 11.6 - 14.5 %    PLATELET 879 392 - 056 K/uL    MPV 9.8 9.2 - 11.8 FL    NRBC 0.0 0  WBC    ABSOLUTE NRBC 0.00 0.00 - 0.01 K/uL    NEUTROPHILS 43 40 - 73 %    BAND NEUTROPHILS 14 %    LYMPHOCYTES 23 21 - 52 %    MONOCYTES 12 (H) 3 - 10 %    EOSINOPHILS 5 0 - 5 %    BASOPHILS 1 0 - 2 %    METAMYELOCYTES 2 %    IMMATURE GRANULOCYTES 0 0.0 - 0.5 %    ABS. NEUTROPHILS 6.2 1.8 - 8.0 K/UL    ABS. LYMPHOCYTES 2.5 0.9 - 3.6 K/UL    ABS. MONOCYTES 1.3 (H) 0.05 - 1.2 K/UL    ABS. EOSINOPHILS 0.5 (H) 0.0 - 0.4 K/UL    ABS. BASOPHILS 0.1 0.0 - 0.1 K/UL    ABS. IMM.  GRANS. 0.0 0.00 - 0.04 K/UL    DF MANUAL      PLATELET COMMENTS ADEQUATE PLATELETS      RBC COMMENTS ANISOCYTOSIS  1+        RBC COMMENTS POLYCHROMASIA  1+        RBC COMMENTS HYPOCHROMIA  1+        WBC COMMENTS VACUOLATED POLYS         Assessment & Plan:     31 y.o. female with PMH breast cancer status post bilateral mastectomies with implant placement, most recently cholecystitis requiring cholecystectomy on 2/8/2022 presenting w/ syncope and abdominal pain.         Severe sepsis secondary to the left breast abscess   Now s/p removal of tissue expander of L breast with drainage and irrigation on 2/20   In ED: sinus tachycardia 140s-150s, lactic acidosis(~5), hyponatremia(128 baseline 137) VESTA (Cr 1.4 baseline 0.8), hyperglycemia (258)  Unclear why patient has left breast abscess since her breast surgery had been several months ago. Will rule out any abdominal source of infection since she recently had a gallbladder surgery about 10 days ago. - ICU was consulted recommended continuing to resuscitate patient with IV fluids. No need to transfer to ICU currently per ICU  -Lactic acid from 5.25->4.36-> 2.4  -Status post 5 L of fluid     Plan     -Continue 150 cc/h of LR  -continue Zosyn, continue vancomycin  -Monitor for signs of fluid overload respiratory status. Patient is young no history of CHF. Ordered proBNP, ICU ordered echo. Last echo looks like it is within normal limits  -Lactic acid every every 4 hours  -Follow-up on blood cultures urine cultures and pro-Mehran  - CT abdomen showed:  Trace residual fluid in the gallbladder fossa, decreased from previous. No  finding for postoperative abscess in the abdomen or pelvis.   -zofran 4mg IV prn (held po zofran)    Elevated BG  -250's-270's  -A1c 6.1% this admission  -on no home meds for T2DM, as pt states she is not Diabetic   Plan:  -TIDAC blood sugar checks  -Humalog 4U once  -started low dose SSI    #syncope+ fall possibly from orthostatic/ sepsis  EKG no ST changes trop X1 negative     Plan:   -Consider a CT head, if patient has persistent emesis or complains of HA  -May need imaging of /left hip if patient has continued pain     Breast cancer s/p bilateral mastectomy w implant  -consider reaching out to heme onc   -hold chemo po med capecitabine 500mg twice a day suppose to be on it starting 2/23        Asthma  - cont prn albuterol     Depression  -cont lexapro 10mg daily     Bladder spasms  - hold oxybutynin         Diet  Adult   DVT Prophylaxis   SQH    GI Prophylaxis  none   Code status  Full   Disposition  TBD     Point of Contact Soo Jarvis   Relationship:   [phone]      Tung Hale MD , PGY-1   [facility]   February 21, 2022, 8:22 AM

## 2022-02-21 NOTE — PROGRESS NOTES
Physician Progress Note      PATIENT:               Yuko García  CSN #:                  [id number]  :                       1995  ADMIT DATE:       2022 8:42 AM  DISCH DATE:  RESPONDING  PROVIDER #:        Libia Freitas MD          QUERY TEXT:    Pt admitted with Severe sepsis secondary to the left breast abscess. Pt noted to be s/p removal of tissue expander of L breast with drainage and irrigation on . If possible, please document in the progress notes and discharge summary if you are evaluating and / or treating any of the following: The medical record reflects the following:  Risk Factors: 31 y.o. female with PMH breast cancer status post bilateral mastectomies with implant placement, most recently cholecystitis requiring cholecystectomy on 2022 presenting w/ syncope and abdominal pain. Clinical Indicators: Per  medicine - Sepsis / L breast abscess s/p removal of tissue expander of L breast with drainage and irrigation  Per H&P - Severe sepsis  likely secondary to the left breast abscess per CT  Per surgery possible due to a hematoma after a fall vs infected implant  Per  Critical care - Patient appears to have significant sepsis from infected left breast implant, I recommend CT abdomen pelvis since patient had recent admission for cholecystitis with recent cholecystectomy, however I suspect the most likely source is the left breast.  Treatment: broad-spectrum antibiotics, 3L, expander removed    Thank you,  Sushma Pope RN, CDI  [email or web address]  .  Options provided:  -- Sepsis related to infected left breast implant  -- Sepsis unrelated to left breast implant  -- Other - I will add my own diagnosis  -- Disagree - Not applicable / Not valid  -- Disagree - Clinically unable to determine / Unknown  -- Refer to Clinical Documentation Reviewer    PROVIDER RESPONSE TEXT:    This patient has sepsis related to left breast implant.     Query created by: Quinton Louis, John Pollock on 2/21/2022 1:37 PM      Electronically signed by:  Kendal Leonard MD 2/21/2022 2:33 PM

## 2022-02-21 NOTE — PROGRESS NOTES
Attempted to see pt for initial assessment but pt off the floor at this time.           Dominique Leija, KAROLINEN RN  Care Management  Pager: [phone]

## 2022-02-21 NOTE — PROGRESS NOTES
[facility]  General Surgery    Subjective:     CC: Status post laparoscopic cholecystectomy with hyperbilirubinemia     HPI: Patient is 3 weeks out from robot-assisted laparoscopic cholecystectomy with firefly. She had elevated bilirubin which decreased preoperatively and increased postoperatively. She is scheduled to see Dr. Giuliana Barrera gastroenterologist at [place] in the near future for endoscopic ultrasound and possible endoscopic retrograde cholangiopancreatography to assess for choledocholithiasis and manage choledocholithiasis if it is present. Her bilirubin at the time of this consultation is 0.7. Patient has no complaints of jaundice or itching. [facility] was [facility] with left breast abscess and is postop day 1 from removal of left breast implant and drainage of large abscess. She is presently pain-free.     Patient Active Problem List    Diagnosis Date Noted    Breast abscess 02/20/2022    Sepsis ([address] 02/20/2022    Abscess of left breast 02/20/2022    Cholecystitis 02/06/2022    Breast cancer ([address] 08/26/2021    Felon of finger of right hand 07/05/2021    Cellulitis and abscess of hand 07/05/2021    Pregnancy 07/30/2016     Past Medical History:   Diagnosis Date    Asthma     last attack years ago    Cancer ([address]     BREAST    Chronic pain of left thumb       Past Surgical History:   Procedure Laterality Date    HAND/FINGER SURGERY UNLISTED      left thumb    HX BREAST BIOPSY      HX BREAST RECONSTRUCTION Bilateral 8/26/2021    BILATERAL BREAST RECONSTRUCTION WITH TISSUE EXPANDERS & ADIPOFASCIAL FLAPS performed by Justin Pool MD at [facility] OR     [facility]  2016    HX LAP CHOLECYSTECTOMY      HX MASTECTOMY Bilateral 8/26/2021    BILATERAL SKIN SPARING MASTECTOMY; LEFT SENTINEL NODE BIOPSY performed by Farhana Wagner MD at [facility] OR      Family History   Problem Relation Age of Onset    Diabetes Mother     No Known Problems Father Social History     Tobacco Use    Smoking status: Never Smoker    Smokeless tobacco: Never Used   Substance Use Topics    Alcohol use: Yes     Comment: OCASSIONAL      Allergies   Allergen Reactions    Percocet [Oxycodone-Acetaminophen] Swelling     Pt can not remember if her mom told her  percocet or penicillin allergy so she does not take either. patient reports tylenol makes her eyes swell. States she is not allergic to Percocet. She has never taken it. Tylenol [Acetaminophen] Anaphylaxis    Dilaudid [Hydromorphone] Hives    Penicillins Other (comments)     Pt stated had allergy as a child as per her mother. Gadavist [Gadobutrol] Nausea Only and Other (comments)     CO NAUSEA AND HEADACHE POST INJECTION. Prior to Admission medications    Medication Sig Start Date End Date Taking? Authorizing Provider   ergocalciferol (Vitamin D2) 1,250 mcg (50,000 unit) capsule Take 50,000 Units by mouth every seven (7) days. Yes Provider, Historical   escitalopram oxalate (Lexapro) 10 mg tablet Take 10 mg by mouth daily. Yes Provider, Historical   loratadine 10 mg cap Take 10 mg by mouth. Yes Provider, Historical   ondansetron hcl (ZOFRAN) 8 mg tablet Take 8 mg by mouth every eight (8) hours as needed for Nausea or Vomiting. Yes Provider, Historical   OXYBUTYNIN CHLORIDE PO Take 5 mg by mouth daily. Provider, Historical   albuterol (PROVENTIL HFA, VENTOLIN HFA, PROAIR HFA) 90 mcg/actuation inhaler Take 1 Puff by inhalation every four (4) hours as needed for Wheezing. 5/2/18   Walter Iglesias MD       Review of Systems:    14 systems were reviewed. The results are as above in the HPI and otherwise negative.      Objective:     Vitals:    02/21/22 0600 02/21/22 0644 02/21/22 0808 02/21/22 1039   BP: (!) 92/50 (!) 94/56 109/65 109/65   Pulse: 89 94 98    Resp: 21 23 22    Temp: 98.1 °F (36.7 °C) 98.1 °F (36.7 °C) 97.6 °F (36.4 °C)    SpO2: 100% 99% 100%    Weight:    103 kg (227 lb)   Height:    5' (1.524 m)       Physical Exam:  GENERAL: alert, cooperative, no distress, appears stated age,   EYE: conjunctivae/corneas clear. PERRL, EOM's intact. THROAT & NECK: normal and no erythema or exudates noted. ,    LYMPHATIC: Cervical, supraclavicular, and axillary nodes normal. ,   LUNG: clear to auscultation bilaterally,   HEART: regular rate and rhythm, S1, S2 normal, no murmur, click, rub or gallop,   ABDOMEN: soft, non-tender. Bowel sounds normal. No masses,  no organomegaly,   EXTREMITIES:  extremities normal, atraumatic, no cyanosis or edema,   SKIN: ALVARO drain with serosanguineous drainage at the left chest  NEUROLOGIC: AOx3. Cranial nerves 2-12 and sensation grossly intact. ,     Data Review:   Recent Results (from the past 24 hour(s))   POC LACTIC ACID    Collection Time: 02/20/22 10:53 AM   Result Value Ref Range    Lactic Acid (POC) 5.25 (HH) 0.40 - 2.00 mmol/L   POC LACTIC ACID    Collection Time: 02/20/22  3:15 PM   Result Value Ref Range    Lactic Acid (POC) 4.36 (HH) 0.40 - 2.00 mmol/L   COVID-19 RAPID TEST    Collection Time: 02/20/22  4:32 PM   Result Value Ref Range    Specimen source Nasopharyngeal      COVID-19 rapid test Not detected NOTD     GLUCOSE, POC    Collection Time: 02/20/22  8:03 PM   Result Value Ref Range    Glucose (POC) 258 (H) 70 - 110 mg/dL   GLUCOSE, POC    Collection Time: 02/20/22  9:02 PM   Result Value Ref Range    Glucose (POC) 273 (H) 70 - 110 mg/dL   CULTURE, WOUND W GRAM STAIN    Collection Time: 02/20/22  9:19 PM    Specimen: Breast   Result Value Ref Range    Special Requests: LEFT  BREAST        GRAM STAIN NO WBC'S SEEN      GRAM STAIN FEW GRAM POSITIVE COCCI IN PAIRS      Culture result: PENDING    GLUCOSE, POC    Collection Time: 02/21/22 12:16 AM   Result Value Ref Range    Glucose (POC) 264 (H) 70 - 110 mg/dL   TYPE & SCREEN    Collection Time: 02/21/22 12:25 AM   Result Value Ref Range    Crossmatch Expiration 02/24/2022,2359     ABO/Rh(D) [employer] POSITIVE     Antibody screen NEG LACTIC ACID    Collection Time: 02/21/22 12:25 AM   Result Value Ref Range    Lactic acid 3.0 (HH) 0.4 - 2.0 MMOL/L   VANCOMYCIN, RANDOM    Collection Time: 02/21/22  7:10 AM   Result Value Ref Range    Vancomycin, random 13.2 5.0 - 40.0 UG/ML   LACTIC ACID    Collection Time: 02/21/22  7:10 AM   Result Value Ref Range    Lactic acid 2.4 (HH) 0.4 - 2.0 MMOL/L   CBC WITH AUTOMATED DIFF    Collection Time: 02/21/22  7:10 AM   Result Value Ref Range    WBC 10.8 4.6 - 13.2 K/uL    RBC 2.82 (L) 4.20 - 5.30 M/uL    HGB 8.0 (L) 12.0 - 16.0 g/dL    HCT 24.8 (L) 35.0 - 45.0 %    MCV 87.9 78.0 - 100.0 FL    MCH 28.4 24.0 - 34.0 PG    MCHC 32.3 31.0 - 37.0 g/dL    RDW 19.7 (H) 11.6 - 14.5 %    PLATELET 883 728 - 405 K/uL    MPV 9.8 9.2 - 11.8 FL    NRBC 0.0 0  WBC    ABSOLUTE NRBC 0.00 0.00 - 0.01 K/uL    NEUTROPHILS 43 40 - 73 %    BAND NEUTROPHILS 14 %    LYMPHOCYTES 23 21 - 52 %    MONOCYTES 12 (H) 3 - 10 %    EOSINOPHILS 5 0 - 5 %    BASOPHILS 1 0 - 2 %    METAMYELOCYTES 2 %    IMMATURE GRANULOCYTES 0 0.0 - 0.5 %    ABS. NEUTROPHILS 6.2 1.8 - 8.0 K/UL    ABS. LYMPHOCYTES 2.5 0.9 - 3.6 K/UL    ABS. MONOCYTES 1.3 (H) 0.05 - 1.2 K/UL    ABS. EOSINOPHILS 0.5 (H) 0.0 - 0.4 K/UL    ABS. BASOPHILS 0.1 0.0 - 0.1 K/UL    ABS. IMM.  GRANS. 0.0 0.00 - 0.04 K/UL    DF MANUAL      PLATELET COMMENTS ADEQUATE PLATELETS      RBC COMMENTS ANISOCYTOSIS  1+        RBC COMMENTS POLYCHROMASIA  1+        RBC COMMENTS HYPOCHROMIA  1+        WBC COMMENTS VACUOLATED POLYS     METABOLIC PANEL, COMPREHENSIVE    Collection Time: 02/21/22  7:10 AM   Result Value Ref Range    Sodium 139 136 - 145 mmol/L    Potassium 4.1 3.5 - 5.5 mmol/L    Chloride 107 100 - 111 mmol/L    CO2 25 21 - 32 mmol/L    Anion gap 7 3.0 - 18 mmol/L    Glucose 150 (H) 74 - 99 mg/dL    BUN 7 7.0 - 18 MG/DL    Creatinine 0.95 0.6 - 1.3 MG/DL    BUN/Creatinine ratio 7 (L) 12 - 20      GFR est AA >60 >60 ml/min/1.73m2    GFR est non-AA >60 >60 ml/min/1.73m2    Calcium 8.1 (L) 8.5 - 10.1 MG/DL    Bilirubin, total 0.7 0.2 - 1.0 MG/DL    ALT (SGPT) 16 13 - 56 U/L    AST (SGOT) 7 (L) 10 - 38 U/L    Alk. phosphatase 87 45 - 117 U/L    Protein, total 5.2 (L) 6.4 - 8.2 g/dL    Albumin 1.7 (L) 3.4 - 5.0 g/dL    Globulin 3.5 2.0 - 4.0 g/dL    A-G Ratio 0.5 (L) 0.8 - 1.7     NT-PRO BNP    Collection Time: 02/21/22  7:10 AM   Result Value Ref Range    NT pro- 0 - 450 PG/ML       Impression:     Patient with history of breast cancer with bilateral mastectomy and reconstruction who is postop day 1 from removal of the left implant and drainage of a large abscess with drain placement. She is 3 weeks out from robot-assisted laparoscopic cholecystectomy with firefly with possible nonobstructing choledocholithiasis.     Plan:     Follow-up as an outpatient  Keep appointment with Dr. Stacey Gaitan for EUS and possible ERCP for choledocholithiasis assessment and management    Signed By: Dane Mello MD     February 21, 2022

## 2022-02-21 NOTE — ANESTHESIA POSTPROCEDURE EVALUATION
Procedure(s):  REMOVAL OF TISSUE EXPANDER LEFT BREAST.    general    Anesthesia Post Evaluation      Multimodal analgesia: multimodal analgesia used between 6 hours prior to anesthesia start to PACU discharge  Patient location during evaluation: PACU  Patient participation: complete - patient participated  Level of consciousness: sleepy but conscious  Pain management: adequate  Airway patency: patent  Anesthetic complications: no  Cardiovascular status: acceptable  Respiratory status: acceptable  Hydration status: acceptable  Post anesthesia nausea and vomiting:  controlled  Final Post Anesthesia Temperature Assessment:  Normothermia (36.0-37.5 degrees C)      INITIAL Post-op Vital signs:   Vitals Value Taken Time   /68 02/20/22 2056   Temp 38 °C (100.4 °F) 02/20/22 2056   Pulse 140 02/20/22 2059   Resp 34 02/20/22 2059   SpO2 100 % 02/20/22 2059   Vitals shown include unvalidated device data.

## 2022-02-21 NOTE — PROGRESS NOTES
PT orders received and chart reviewed. PT eval attempted at 1120. Pt currently off floor. Will follow up.      Thank you for this referral.   Obi Mora PT DPT

## 2022-02-21 NOTE — DIABETES MGMT
Diabetes/ Glycemic Control Plan of Care    Assessment:   off the floor this morning -   Hyperglycemia on admission. Per EMR, no previous h/o DM. A1c 6.1%  Currently receiving a low dose of basal and corrective insulin  Will continue to monitor and follow up with patient     DX:   1. Sepsis, due to unspecified organism, unspecified whether acute organ dysfunction present ([facility]     2. Breast abrasion, left, initial encounter     3. Lactic acidosis     4. Leukocytosis, unspecified type     5. Abscess of left breast     6. Sinus tachycardia     7. Malignant neoplasm of left breast in female, estrogen receptor positive, unspecified site of breast ([facility]        Fasting/ Morning blood glucose:   Lab Results   Component Value Date/Time    Glucose 150 (H) 02/21/2022 07:10 AM    Glucose (POC) 186 (H) 02/21/2022 11:29 AM     IV Fluids containing dextrose: none  Steroids:  none     Blood glucose values: Within target range (70-180mg/dL):  no    Current insulin orders:  lantus 5 units nightly  Corrective humalog, very insulin resistant, 4 times daily    Total Daily Dose previous 24 hours = 9 units   5 units lantus  4 units humalog    Current A1c:   Lab Results   Component Value Date/Time    Hemoglobin A1c 6.1 (H) 02/20/2022 09:40 AM      equivalent  to ave Blood Glucose of  128 mg/dl for 2-3 months prior to admission  Adequate glycemic control PTA: yes    Nutrition/Diet:   Adult regular, 4 carb choices. Meal Intake:  No data found. Supplement Intake:  No data found. Home diabetes medications:   Key Antihyperglycemic Medications     Patient is on no antihyperglycemic meds. Plan/Goals:   Blood glucose will be within target of 70 - 180 mg/dl within 72 hours  Reinforce dietary and medication compliance at home.        Education: will follow up    Hui Pizano MPH RN 1 [facility]  Pager [phone]  Office [phone]

## 2022-02-21 NOTE — OP NOTES
Date of Procedure: 2/20/2022  Preoperative Diagnosis: infected breast  Postoperative Diagnosis: * No post-op diagnosis entered *  Procedure(s):  Procedure(s):  REMOVAL OF TISSUE EXPANDER LEFT BREAST    Surgeon(s) and Role:      Naren Holliday MD - Primary         Surgical Staff: Circ-1: Ruiz Loving RN  Scrub Tech-1: Fran QUEZADA  Surg Asst-1: Merribeth Sacks E      Event Time In     Event Time In   Incision Start 2028   Incision Close      Event Time In   Patient In - Facility (Arrived) 4725   Patient In - [facility]   Anesthesia Start 2014   Patient Ready for [address]   Patient Ready for OR    Surgeon Available    Patient Out - Pre-op    Patient In - OR 2014   Surgeon In [facility]   Incision Start 2028   Incision Close    Surgeon out of OR 2046   Patient Out - OR    Anesthesia Stop    Patient In - Phase I    Notice to Anesthesia    Care Complete - Phase I    Patient Out - Phase I    Patient In - Phase II    Patient Tolerates Liquids    Patient Ready for Visitors    Patient Education Complete    Patient Voided    Care Complete - Phase II    Patient Out - Phase II    End of Periop Care    Patient In - Overflow    Patient Out - Overflow        Anesthesia: General  Anesthesia staff: Anesthesiologist: Rebekah Barker MD  CRNA: Shan Rodarte CRNA  Estimated Blood Loss: 5cc  Specimens: * No specimens in log *     Findings: copious pus, expander intact   Complications: none noted  Implants: * No implants in log *      The patient was identified in the preoperative holding area and the risks again were reviewed with the patient who understands and agrees. She understands the risk of bleeding, infection, damage to surrounding structures, hematoma/seroma formation, and the possibility of missing the lesion in question. She also understands additional surgery may be indicated. She was also marked by me to confirm the site and the procedure.     The patient was taken to the operating suite and placed supine on the table. Compression stockings were placed and anesthesia induced without complication. She was then prepped and draped in the usual sterile fashion and an appropriate time-out was performed to confirm the patient, the side, and the procedure. The inferior lateral portion of the previous scar was reopened. Copious pus was evacuated. Cultures sent. The expander was incised with a 15 blade scalpel to desufflate to permit easier removal.  It was then removed in its entirety and passed off as specimen. The wound was copiously irrigated with multiple liters of saline until the effluent was clear. Hemostasis was assured. All counts were reported correct. A 15 round Ced drain was placed. The incision was closed with running locked 3-0 nylon suture. The patient was taken to recovery in stable condition.

## 2022-02-21 NOTE — PROGRESS NOTES
0800:  Report received, care assumed. In bed AAOx4. Denies pain, SOB or discomforts. Complete assessment performed. POD#1. Left lateral chest surgical dressing c/d/i with ALVARO drain charged/ patent. Ice pack applied to site. PIV right lower arm infusing LR as ordered. NSR. VSS. Pure wick intact for urine collection. Breakfast served. Call bell and phones at side. Monitor. 1030: To echocardiogram testing via bed.  1200:  Returned to room. 1315:  Therapy assisted OOB to recliner, legs elevated. Pain with movement, see mar for meds given. 1705: This RN received + Blood Culture results, called to Dr Ashley Guillen, see Critical Results Flowsheet. Patient with new c/o \"double vision\" started when pt was \"ambulating in halls with Physical Therapy, pt informed PT staff\". RN just being informed now. Pt reassessed now. In bed eating dinner. AAOx4. Denies pain or SOB. NSR 89/min. /82. RR 16min. Room air pulse ox sats =100%. Dr Ashley Guillen PFM informed of all this info.  1900:  Shift change report given to [facility], RN with bedside rounds.

## 2022-02-21 NOTE — PROGRESS NOTES
Called about pt's BP 83/51, MAP 62 currently asymptomatic. HR improved. Order for 500cc LR bolus over 30 min. Will recheck BP after infusion. BP improved, MAP 64. HR's 80's. Remains asymptomatic. Order for another 500cc bolus, then recheck BP. BP improved to 94/56, MAP 69 s/p 1L LR. Pending am labs.     Km Lozoya MD, PGY-1

## 2022-02-21 NOTE — PROGRESS NOTES
OT order received and chart reviewed. Patient off the unit, ECHO. Will continue to follow and see patient when available/as appropriate.             Thank you for this referral,   Leah De Jesus MS, OTR/L

## 2022-02-21 NOTE — PROGRESS NOTES
Post-procedure check  [facility] Medicine      Subjective:      Pt is seen back in her room on CVT stepdown s/p removal of tissue expander of L breast with drainage of copius amounts of pus followed by irrigation with saline. Pt states she is feeling much better since arrival earlier today, only complaint is mild SOB which is new for her. Currently on 2L NC satting 100%. Improved appetite, but did have episode of emesis. Only pain is when she touches the bandage on her L breast. Denies CP, f/c. Remains tachycardic to 120's, afebrile. Informs us she is allergic to acetaminophen, previously got scratchy throat. BG's remain high in 270's, A1c 6.1% this admission, no DM diagnoses. Most recent lactic acid 4.36. Remains on mIVF at 150cc/hr. Objective:     Visit Vitals  BP (!) 96/53   Pulse (!) 117   Temp 98.5 °F (36.9 °C)   Resp 29   Ht 5' (1.524 m)   Wt 103.1 kg (227 lb 6.4 oz)   SpO2 100%   BMI 44.41 kg/m²     Physical Exam:   General: well-appearing, NAD  HEENT: Conjunctiva pink, sclera anicteric. EOMI   CV:  Tachycardic, RR, no M/G/R  RESP: Unlabored breathing. Lungs CTAB, no wheezes, rales or rhonchi appreciated. Equal expansion bilaterally. On 2L NC  CHEST:  L chest wall bandage C/D/I  ABD:  Soft, nontender, nondistended. No hepatosplenomegaly. Neuro: A+Ox3. Ext:  No edema. 2+ radial and dp pulses bilaterally. Skin: Warm & dry. No rashes, lesions, or ulcers. Good turgor. Psych: normal mood and affect     Assessment & Plan:   1. Sepsis 2/2 L breast abscess s/p removal of tissue expander of L breast with drainage and irrigation  -pain controlled, s/p fentanyl in ED  -monitor for fevers-pt is allergic to acetaminophen (scratchy, swollen throat)  -monitor tachycardia; s/p 4L LR still, cont.  mIVF at 150cc/hr  -remains on supplemental O2-monitor for fluid overload, wean as tolerated  -abx: Vanco, Zosyn  -FU blood cx, wound cx -narrow abx as appropriate  -drain in place, FU surgery recs  -lactic acid downtrending 5.25 --> 4.36  -pt vomited therefore remains NPO for now  -started heparin 5000U q8hr for DVT ppx    2. Elevated BG  -250's-270's  -A1c 6.1% this admission  -start [facility] blood sugar checks  -Humalog 4U once  -started low dose SSI    3. Tachycardia   -likely 2/2 to sepsis  -denies pain  -now afebrile  -office notes shows 1x   -FU TTE to r/o cardiomyopathy-per ICU recs  -cont LR at 150cc/hr    For full assessment and plan, please see daily progress note.      Sydnee Blunt MD, PGY-1   [facility] Medicine   February 21, 2022, 11:35 PM

## 2022-02-21 NOTE — PROGRESS NOTES
[address] [facility] Pharmacokinetic Monitoring Service - Vancomycin    Indication: SSTI  Target Concentration: Goal AUC/MICHELLE 400-600 mg*hr/L  Day of Therapy: 2  Additional Antimicrobials: pip-tazo    Pertinent Laboratory Values: Wt Readings from Last 1 Encounters:   02/21/22 103 kg (227 lb)     Temp Readings from Last 1 Encounters:   02/21/22 97.2 °F (36.2 °C)     No components found for: PROCAL  Estimated Creatinine Clearance: 96.2 mL/min (based on SCr of 0.95 mg/dL). Recent Labs     02/21/22  0710 02/20/22  0940   WBC 10.8 13.7*       Pertinent Cultures:  Culture Date Source Results   02/20 02/20 Blood  wound NGTD  GPC pairs   MRSA Nasal Swab: N/A. Non-respiratory infection. .    Assessment:  Date/Time Current Dose Concentration Timing of Concentration (h) AUC   02/21 750 mg q12h 13.2 mg/L 8 hr post dose < 400   Note: Serum concentrations collected for AUC dosing may appear elevated if collected in close proximity to the dose administered, this is not necessarily an indication of toxicity    Plan:  Current dosing regimen is sub-therapeutic  Increased dose to 1,500 mg q18h  No level ordered at this time  Pharmacy will continue to monitor patient and adjust therapy as indicated    Thank you for the consult,  FANNIE Beaver  2/21/2022

## 2022-02-21 NOTE — PROGRESS NOTES
Problem: Mobility Impaired (Adult and Pediatric)  Goal: *Acute Goals and Plan of Care (Insert Text)  Description: Physical Therapy Goals  Initiated 2/21/2022 and to be accomplished within 7 day(s)  1. Patient will move from supine to sit and sit to supine , scoot up and down, and roll side to side in bed with modified independence. 2.  Patient will transfer from bed to chair and chair to bed with modified independence using the least restrictive device. 3.  Patient will perform sit to stand with modified independence. 4.  Patient will ambulate with modified independence for 400 feet with the least restrictive device. PLOF: Pt reporting she lives alone in 1 story house with 0 SHITAL. Independent PTA. Pt has friend support as needed per pt report. Outcome: Progressing Towards Goal   PHYSICAL THERAPY EVALUATION    Patient: Chase Varela (31 y.o. female)  Date: 2/21/2022  Primary Diagnosis: Sepsis ([facility] [A41.9]  Breast abscess [N61.1]  Abscess of left breast [N61.1]  Procedure(s) (LRB):  REMOVAL OF TISSUE EXPANDER LEFT BREAST, PLACEMENT OF DRAIN (Left) 1 Day Post-Op   Precautions:   Fall (s/p removal tissue L breast - no push/pull/heavy lifting )    ASSESSMENT :  Pt cleared to participate in PT session, pt received sitting in recliner and agreeable to therapy session with friend at bedside. Based on the objective data described below, the patient presents with decreased endurance, decreased strength, decreased balance reactions, gait deviations, and decreased independence in functional mobility. Pt completing sit to stand with CGA to RW. Pt ambulating with slow gait speed x200 feet. Pt returned to supine in bed. Pt positioned for comfort and educated to call for assist before getting up, pt verbalized understanding. Pt left with all needs met and call bell in reach. RN notified of position and participation. Patient will benefit from skilled intervention to address the above impairments.   Patient's rehabilitation potential is considered to be Good  Factors which may influence rehabilitation potential include:   []         None noted  [x]         Mental ability/status  []         Medical condition  []         Home/family situation and support systems  []         Safety awareness  []         Pain tolerance/management  []         Other:      PLAN :  Recommendations and Planned Interventions:   [x]           Bed Mobility Training             []    Neuromuscular Re-Education  [x]           Transfer Training                   []    Orthotic/Prosthetic Training  [x]           Gait Training                          []    Modalities  [x]           Therapeutic Exercises           []    Edema Management/Control  [x]           Therapeutic Activities            [x]    Family Training/Education  [x]           Patient Education  []           Other (comment):    Frequency/Duration: Patient will be followed by physical therapy 1-2 times per day/4-7 days per week to address goals. Discharge Recommendations: Home Health with friend support   Further Equipment Recommendations for Discharge: RW     SUBJECTIVE:   Patient stated Terry Carlos had my gall bladder out 2 weeks ago.     OBJECTIVE DATA SUMMARY:     Past Medical History:   Diagnosis Date    Asthma     last attack years ago    Cancer ([address]     BREAST    Chronic pain of left thumb      Past Surgical History:   Procedure Laterality Date    HAND/FINGER SURGERY UNLISTED      left thumb    HX BREAST BIOPSY      HX BREAST RECONSTRUCTION Bilateral 2021    BILATERAL BREAST RECONSTRUCTION WITH TISSUE EXPANDERS & ADIPOFASCIAL FLAPS performed by Gary Burr MD at [facility]    HX  SECTION  2016    HX LAP CHOLECYSTECTOMY      HX MASTECTOMY Bilateral 2021    BILATERAL SKIN SPARING MASTECTOMY; LEFT SENTINEL NODE BIOPSY performed by Karina Valle MD at [facility] MAIN OR     Barriers to Learning/Limitations: None  Compensate with: N/A  Home Situation:  Home Situation  Home Environment: Apartment  # Steps to Enter: 0  One/Two Story Residence: One story  Living Alone: Yes  Support Systems: Friend/Neighbor,Other Family Member(s)  Current DME Used/Available at Home: None  Tub or Shower Type: Tub/Shower combination  Critical Behavior:  Neurologic State: Alert  Orientation Level: Oriented X4  Cognition: Follows commands  Safety/Judgement: Fall prevention  Psychosocial  Patient Behaviors: Calm; Cooperative  Purposeful Interaction: Yes  Pt Identified Daily Priority: Clinical issues (comment)  Car[employer] Process: Establish trust;Teaching/learning; Attend basic human needs  Caring Interventions: Reassure  Reassure: Therapeutic listening; Informing  Therapeutic Modalities: Intentional therapeutic touch  Skin Condition/Temp: Dry;Warm     Skin Integrity: Incision (comment) (left lateral chest sugical POD#1 c/d/i.)  Skin Integumentary  Skin Color: Appropriate for ethnicity  Skin Condition/Temp: Dry;Warm  Skin Integrity: Incision (comment) (left lateral chest sugical POD#1 c/d/i.)  Turgor: Non-tenting     Strength:    Strength: Within functional limits    Tone & Sensation:   Tone: Normal  Sensation: Intact    Range Of Motion:  AROM: Within functional limits    Posture:  Posture (WDL): Within defined limits     Functional Mobility:  Bed Mobility:     Supine to Sit:  (found up in chair )     Scooting: Modified independent  Transfers:  Sit to Stand: Contact guard assistance  Stand to Sit: Contact guard assistance    Balance:   Sitting: Intact  Standing: Impaired; With support  Standing - Static: Good  Standing - Dynamic : Fair (+)    Ambulation/Gait Training:  Distance (ft): 200 Feet (ft)  Assistive Device: Walker, rolling  Ambulation - Level of Assistance: Contact guard assistance     Gait Description (WDL): Exceptions to WDL  Gait Abnormalities: Decreased step clearance    Base of Support: Narrowed     Speed/Iza: Slow  Step Length: Right shortened;Left shortened    Pain:  Pain level pre-treatment: 0/10   Pain level post-treatment: 0/10     Activity Tolerance:   Fair tolerance     Please refer to the flowsheet for vital signs taken during this treatment. After treatment:   []         Patient left in no apparent distress sitting up in chair  [x]         Patient left in no apparent distress in bed  [x]         Call bell left within reach  [x]         Nursing notified  []         Caregiver present  []         Bed alarm activated  []         SCDs applied    COMMUNICATION/EDUCATION:   [x]         Role of Physical Therapy in the acute care setting. [x]         Fall prevention education was provided and the patient/caregiver indicated understanding. [x]         Patient/family have participated as able in goal setting and plan of care. [x]         Patient/family agree to work toward stated goals and plan of care. []         Patient understands intent and goals of therapy, but is neutral about his/her participation. []         Patient is unable to participate in goal setting/plan of care: ongoing with therapy staff.  []         Other:     Thank you for this referral.  Duane German, PT   Time Calculation: 25 mins      Eval Complexity: History: MEDIUM  Complexity : 1-2 comorbidities / personal factors will impact the outcome/ POC Exam:LOW Complexity : 1-2 Standardized tests and measures addressing body structure, function, activity limitation and / or participation in recreation  Presentation: LOW Complexity : Stable, uncomplicated  Clinical Decision Making:Low Complexity low  Overall Complexity:LOW

## 2022-02-21 NOTE — PROGRESS NOTES
Problem: Self Care Deficits Care Plan (Adult)  Goal: *Acute Goals and Plan of Care (Insert Text)  Description: Occupational Therapy Goals  Initiated 2/21/2022 within 7 day(s). 1.  Patient will perform functional activity standing for 6-9 minutes with modified independence, G balance. 2.  Patient will perform upper body dressing with modified independence using compensatory techniques prn.  3.  Patient will perform lower body dressing with modified independence using adaptive equipment prn.  4.  Patient will perform toilet transfers with modified independence. 5.  Patient will perform all aspects of toileting with modified independence. 6.  Patient will participate in upper extremity therapeutic exercise/activities with modified independence for 5-8 minutes. 7.  Patient will utilize energy conservation techniques during functional activities with verbal cues. Prior Level of Function: Patient was independent with self-care and functional mobility PTA. Outcome: Progressing Towards Goal     OCCUPATIONAL THERAPY EVALUATION    Patient: Emily Philippe (31 y.o. female)  Date: 2/21/2022  Primary Diagnosis: Sepsis ([facility] [address] [A41.9]  Breast abscess [N61.1]  Abscess of left breast [N61.1]  Procedure(s) (LRB):  REMOVAL OF TISSUE EXPANDER LEFT BREAST, PLACEMENT OF DRAIN (Left) 1 Day Post-Op   Precautions: Fall (s/p removal tissue L breast - no push/pull/heavy lifting )      ASSESSMENT :  Patient cleared to participate in OT evaluation by RN. Upon entering the room, patient was supine in bed, alert, and agreeable to participate in OT evaluation. Patient educated on the role of OT, evaluation process, and safety during this admission with patient verbalizing understanding. Patient pain varied throughout session 0/10 at rest, 5/10 sitting EOB initially, 10/10 during functional mobility and 7/10 s/p session. DAIANA Fields aware of pain increase. HR ranged between 98 bpm - 124 bpm this session.  Patient required extensive time this session d/t increased pain. Patient educated on energy conservation techniques, pursed lip breathing, and self pacing during daily activities. Patient stand by assist for bed mobility, contact guard assist for sit <> stand and toilet transfers and moderate assist for LB ADLs such as donning underwear and BM hygiene. Patient educated on adaptive equipment for lower body dressing and how to don/doff socks, pants, and underwear. Patient also educated on equipment for bathing and issued handout of AE for next session pending pain. Patient reported feeling dizzy while ambulating back from restroom this session, /101. Patient educated on energy conservation techniques such as pursed lip breathing and keeping eyes open with focus on steady object. Patient reported dizziness improved. Patient demonstrated fair dynamic standing balance and verbalized understanding of not standing without a staff member. Based on the objective data described below, the patient presented with decreased independence, decreased AROM, decreased endurance, decreased functional balance, and decreased functional mobility, which impede pt's function with basic self-care/ADL tasks. BP /69, after standing 130/101, at rest in chair 116/73. Patient will benefit from skilled intervention to address the above impairments.   Patient's rehabilitation potential is considered to be Fair  Factors which may influence rehabilitation potential include:   []             None noted  []             Mental ability/status  [x]             Medical condition  [x]             Home/family situation and support systems  []             Safety awareness  [x]             Pain tolerance/management  []             Other:      PLAN :  Recommendations and Planned Interventions:   [x]               Self Care Training                  [x]      Therapeutic Activities  [x]               Functional Mobility Training   []      Cognitive Retraining  [x]               Therapeutic Exercises           [x]      Endurance Activities  [x]               Balance Training                    [x]      Neuromuscular Re-Education  []               Visual/Perceptual Training     [x]      Home Safety Training  [x]               Patient Education                   [x]      Family Training/Education  []               Other (comment):    Frequency/Duration: Patient will be followed by occupational therapy 3 - 5 times a week to address goals. 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 \" and \"Im so sorry\" when OT assisting with toileting    OBJECTIVE DATA SUMMARY:     Past Medical History:   Diagnosis Date    Asthma     last attack years ago    Cancer ([facility] [address]     BREAST    Chronic pain of left thumb      Past Surgical History:   Procedure Laterality Date    HAND/FINGER SURGERY UNLISTED      left thumb    HX BREAST BIOPSY      HX BREAST RECONSTRUCTION Bilateral 2021    BILATERAL BREAST RECONSTRUCTION WITH TISSUE EXPANDERS & ADIPOFASCIAL FLAPS performed by Santa Nova MD at [facility] OR    HX  SECTION  2016    HX LAP CHOLECYSTECTOMY      HX MASTECTOMY Bilateral 2021    BILATERAL SKIN SPARING MASTECTOMY; LEFT SENTINEL NODE BIOPSY performed by Lizzie Reich MD at [facility] OR     Barriers to Learning/Limitations: None  Compensate with: visual, verbal, tactile, kinesthetic cues/model    Home Situation:   Home Situation  Home Environment: Apartment  # Steps to Enter: 0  One/Two Story Residence: One story  Living Alone: Yes  Support Systems: Friend/Neighbor,Other Family Member(s)  Current DME Used/Available at Home: None  Tub or Shower Type: Tub/Shower combination  [x]  Right hand dominant   []  Left hand dominant    Cognitive/Behavioral Status:  Neurologic State: Alert  Orientation Level: Oriented X4  Cognition: Follows commands  Safety/Judgement: Fall prevention    Skin: intact  Edema: None noted    Vision/Perceptual:    Acuity: Within Defined Limits      Coordination: BUE  Fine Motor Skills-Upper: Left Intact; Right Intact    Gross Motor Skills-Upper: Right Intact; Left Impaired    Balance:  Sitting: Intact  Standing: Impaired; Without support  Standing - Static: Fair (+)  Standing - Dynamic : Fair    Strength: BUE  Strength:  (B  4+/5)       Tone & Sensation: BUE  Tone: Normal  Sensation: Intact    Range of Motion: BUE  AROM: Generally decreased, functional     Functional Mobility and Transfers for ADLs:  Bed Mobility:  Supine to Sit: Stand-by assistance; Additional time  Scooting: Stand-by assistance; Additional time    Transfers:  Sit to Stand: Contact guard assistance (x 2 as pt dizzy upon first stand)  Stand to Sit: Contact guard assistance   Toilet Transfer : Contact guard assistance (x 2 this session)       ADL Assessment:   Feeding: Independent    Oral Facial Hygiene/Grooming: Modified Independent    Bathing: Moderate assistance    Upper Body Dressing: Minimum assistance    Lower Body Dressing: Moderate assistance    Toileting: Moderate assistance (standing)     ADL Intervention:  Feeding  Feeding Assistance: Independent  Container Management: Independent  Drink to Mouth: Independent    Grooming  Grooming Assistance: Modified independent  Position Performed: Standing  Washing Hands: Modified independent (at sink, supervision for balance)    Upper Body Dressing Assistance  Dressing Assistance: Minimum assistance  Hospital Gown: Minimum  assistance (needs compensatory training; button ups vs. pullover)    Lower Body Dressing Assistance  Dressing Assistance: Moderate assistance  Underpants: Moderate assistance (mesh)  Position Performed: Seated in chair;Standing    Toileting  Toileting Assistance: Moderate assistance  Bowel Hygiene:  Moderate assistance (standing)    Cognitive Retraining  Safety/Judgement: Fall prevention    Pain:  Pain level pre-treatment: 0/10 , at rest  Pain level post-treatment: 7/10 , L breast and gllbladder  Pain Intervention(s): Medication (see MAR); Response to intervention: Nurse notified, See doc flow    Activity Tolerance:   Fair+    Please refer to the flowsheet for vital signs taken during this treatment. After treatment:   [x] Patient left in no apparent distress sitting up in chair  [] Patient left in no apparent distress in bed  [x] Call bell left within reach  [x] Nursing notified  [] Caregiver present  [] Bed alarm activated    COMMUNICATION/EDUCATION:   [x] Role of Occupational Therapy in the acute care setting  [x] Home safety education was provided and the patient/caregiver indicated understanding. [x] Patient/family have participated as able in goal setting and plan of care. [x] Patient/family agree to work toward stated goals and plan of care. [] Patient understands intent and goals of therapy, but is neutral about his/her participation. [] Patient is unable to participate in goal setting and plan of care. Thank you for this referral.  Janes Tan, OTR/L  Time Calculation: 47 mins    Eval Complexity: History: MEDIUM Complexity : Expanded review of history including physical, cognitive and psychosocial  history ; Examination: MEDIUM Complexity : 3-5 performance deficits relating to physical, cognitive , or psychosocial skils that result in activity limitations and / or participation restrictions; Decision Making:MEDIUM Complexity : Patient may present with comorbidities that affect occupational performnce.  Miniml to moderate modification of tasks or assistance (eg, physical or verbal ) with assesment(s) is necessary to enable patient to complete evaluation

## 2022-02-21 NOTE — PROGRESS NOTES
TRANSFER - IN REPORT:    Verbal report received from [employer], RN (name) on Jeet Alvarado  being received from PACU (unit) for routine progression of care      Report consisted of patients Situation, Background, Assessment and   Recommendations(SBAR). Information from the following report(s) SBAR, Kardex, ED Summary, Procedure Summary, Intake/Output, MAR, Recent Results, Med Rec Status and Cardiac Rhythm Sinus Tach was reviewed with the receiving nurse. Opportunity for questions and clarification was provided. Assessment completed upon patients arrival to unit and care assumed. 2210: Assumed care, V/S done, dressing dry and intact    0520: Spoke to MD about pt BP, Acknowledged orders    Bedside and Verbal shift change report given to Jennifer Leach RN (oncoming nurse) by Tripp Werner RN (offgoing nurse).  Report included the following information SBAR, Kardex, ED Summary, Intake/Output, MAR, Recent Results, Med Rec Status and Cardiac Rhythm Sinus Tach, NSR.

## 2022-02-21 NOTE — PERIOP NOTES
TRANSFER - OUT REPORT:    Telephone report given to Neeta(name) on Clement Phillips  being transferred to [facility](unit) for routine post - op       Report consisted of patients Situation, Background, Assessment and   Recommendations(SBAR). Information from the following report(s) SBAR and OR Summary was reviewed with the receiving nurse. Lines:   Peripheral IV 02/20/22 Right;Upper (Active)   Site Assessment Clean, dry, & intact 02/20/22 2056   Phlebitis Assessment 0 02/20/22 2056   Infiltration Assessment 0 02/20/22 2056   Dressing Status Clean, dry, & intact 02/20/22 2056   Dressing Type Tape;Transparent 02/20/22 2056   Hub Color/Line Status Pink; Infusing 02/20/22 2056        Opportunity for questions and clarification was provided.       Patient transported with:   O2 @ 2 liters  Registered Nurse

## 2022-02-21 NOTE — PROGRESS NOTES
POD 1      No acute events. No complaints. Feels much better. Also discussed last night with her medical oncologist      Visit Vitals  /65 (BP 1 Location: Right leg, BP Patient Position: At rest)   Pulse 98   Temp 97.6 °F (36.4 °C)   Resp 22   Ht 5' (1.524 m)   Wt 103.1 kg (227 lb 6.4 oz)   SpO2 100%   BMI 44.41 kg/m²         Phone conversation      Patient Active Problem List   Diagnosis Code    Pregnancy Z34.90    Felon of finger of right hand L03.011    Cellulitis and abscess of hand L03.119, L02.519    Breast cancer ([address] C50.919    Cholecystitis K81.9    Breast abscess N61.1    Sepsis ([address] A41.9    Abscess of left breast N61.1         Continue drain to bulb suction. Dispo per primary team.  Follow up with me or Dr. Sukumar Thompson 1 week.

## 2022-02-22 ENCOUNTER — HOME HEALTH ADMISSION (OUTPATIENT)
Dept: HOME HEALTH SERVICES | Facility: HOME HEALTH | Age: 27
End: 2022-02-22
Payer: MEDICAID

## 2022-02-22 ENCOUNTER — APPOINTMENT (OUTPATIENT)
Dept: CT IMAGING | Age: 27
DRG: 711 | End: 2022-02-22
Attending: STUDENT IN AN ORGANIZED HEALTH CARE EDUCATION/TRAINING PROGRAM
Payer: MEDICAID

## 2022-02-22 LAB
ALBUMIN SERPL-MCNC: 1.6 G/DL (ref 3.4–5)
ALBUMIN/GLOB SERPL: 0.5 {RATIO} (ref 0.8–1.7)
ALP SERPL-CCNC: 80 U/L (ref 45–117)
ALT SERPL-CCNC: 17 U/L (ref 13–56)
ANION GAP SERPL CALC-SCNC: 6 MMOL/L (ref 3–18)
AST SERPL-CCNC: 10 U/L (ref 10–38)
BACTERIA SPEC CULT: NORMAL
BASOPHILS # BLD: 0.1 K/UL (ref 0–0.1)
BASOPHILS NFR BLD: 1 % (ref 0–2)
BILIRUB SERPL-MCNC: 0.4 MG/DL (ref 0.2–1)
BUN SERPL-MCNC: 6 MG/DL (ref 7–18)
BUN/CREAT SERPL: 8 (ref 12–20)
CALCIUM SERPL-MCNC: 8 MG/DL (ref 8.5–10.1)
CHLORIDE SERPL-SCNC: 109 MMOL/L (ref 100–111)
CO2 SERPL-SCNC: 26 MMOL/L (ref 21–32)
CREAT SERPL-MCNC: 0.74 MG/DL (ref 0.6–1.3)
DIFFERENTIAL METHOD BLD: ABNORMAL
EOSINOPHIL # BLD: 0.3 K/UL (ref 0–0.4)
EOSINOPHIL NFR BLD: 3 % (ref 0–5)
ERYTHROCYTE [DISTWIDTH] IN BLOOD BY AUTOMATED COUNT: 19.9 % (ref 11.6–14.5)
GLOBULIN SER CALC-MCNC: 3.4 G/DL (ref 2–4)
GLUCOSE BLD STRIP.AUTO-MCNC: 136 MG/DL (ref 70–110)
GLUCOSE SERPL-MCNC: 126 MG/DL (ref 74–99)
HCT VFR BLD AUTO: 25.4 % (ref 35–45)
HGB BLD-MCNC: 8 G/DL (ref 12–16)
IMM GRANULOCYTES # BLD AUTO: 0.2 K/UL (ref 0–0.04)
IMM GRANULOCYTES NFR BLD AUTO: 2 % (ref 0–0.5)
LYMPHOCYTES # BLD: 1.7 K/UL (ref 0.9–3.6)
LYMPHOCYTES NFR BLD: 15 % (ref 21–52)
MCH RBC QN AUTO: 27.7 PG (ref 24–34)
MCHC RBC AUTO-ENTMCNC: 31.5 G/DL (ref 31–37)
MCV RBC AUTO: 87.9 FL (ref 78–100)
MONOCYTES # BLD: 0.8 K/UL (ref 0.05–1.2)
MONOCYTES NFR BLD: 7 % (ref 3–10)
NEUTS SEG # BLD: 8 K/UL (ref 1.8–8)
NEUTS SEG NFR BLD: 73 % (ref 40–73)
NRBC # BLD: 0 K/UL (ref 0–0.01)
NRBC BLD-RTO: 0 PER 100 WBC
PLATELET # BLD AUTO: 306 K/UL (ref 135–420)
PMV BLD AUTO: 10.1 FL (ref 9.2–11.8)
POTASSIUM SERPL-SCNC: 3.6 MMOL/L (ref 3.5–5.5)
PROT SERPL-MCNC: 5 G/DL (ref 6.4–8.2)
RBC # BLD AUTO: 2.89 M/UL (ref 4.2–5.3)
SERVICE CMNT-IMP: NORMAL
SODIUM SERPL-SCNC: 141 MMOL/L (ref 136–145)
WBC # BLD AUTO: 11.1 K/UL (ref 4.6–13.2)

## 2022-02-22 PROCEDURE — 74011250636 HC RX REV CODE- 250/636

## 2022-02-22 PROCEDURE — 74011250636 HC RX REV CODE- 250/636: Performed by: STUDENT IN AN ORGANIZED HEALTH CARE EDUCATION/TRAINING PROGRAM

## 2022-02-22 PROCEDURE — 74011250637 HC RX REV CODE- 250/637: Performed by: STUDENT IN AN ORGANIZED HEALTH CARE EDUCATION/TRAINING PROGRAM

## 2022-02-22 PROCEDURE — 74011000258 HC RX REV CODE- 258: Performed by: STUDENT IN AN ORGANIZED HEALTH CARE EDUCATION/TRAINING PROGRAM

## 2022-02-22 PROCEDURE — 80053 COMPREHEN METABOLIC PANEL: CPT

## 2022-02-22 PROCEDURE — 65270000029 HC RM PRIVATE

## 2022-02-22 PROCEDURE — 97530 THERAPEUTIC ACTIVITIES: CPT

## 2022-02-22 PROCEDURE — 70450 CT HEAD/BRAIN W/O DYE: CPT

## 2022-02-22 PROCEDURE — 85025 COMPLETE CBC W/AUTO DIFF WBC: CPT

## 2022-02-22 PROCEDURE — 82962 GLUCOSE BLOOD TEST: CPT

## 2022-02-22 PROCEDURE — 74011250636 HC RX REV CODE- 250/636: Performed by: FAMILY MEDICINE

## 2022-02-22 PROCEDURE — 2709999900 HC NON-CHARGEABLE SUPPLY

## 2022-02-22 PROCEDURE — 36415 COLL VENOUS BLD VENIPUNCTURE: CPT

## 2022-02-22 PROCEDURE — 87040 BLOOD CULTURE FOR BACTERIA: CPT

## 2022-02-22 PROCEDURE — 74011000250 HC RX REV CODE- 250: Performed by: STUDENT IN AN ORGANIZED HEALTH CARE EDUCATION/TRAINING PROGRAM

## 2022-02-22 RX ADMIN — SODIUM CHLORIDE, PRESERVATIVE FREE 10 ML: 5 INJECTION INTRAVENOUS at 21:20

## 2022-02-22 RX ADMIN — CETIRIZINE HYDROCHLORIDE 10 MG: 10 TABLET, FILM COATED ORAL at 08:52

## 2022-02-22 RX ADMIN — VANCOMYCIN HYDROCHLORIDE 1500 MG: 10 INJECTION, POWDER, LYOPHILIZED, FOR SOLUTION INTRAVENOUS at 18:00

## 2022-02-22 RX ADMIN — HEPARIN SODIUM 5000 UNITS: 5000 INJECTION INTRAVENOUS; SUBCUTANEOUS at 08:51

## 2022-02-22 RX ADMIN — SODIUM CHLORIDE, SODIUM LACTATE, POTASSIUM CHLORIDE, AND CALCIUM CHLORIDE 100 ML/HR: 600; 310; 30; 20 INJECTION, SOLUTION INTRAVENOUS at 00:57

## 2022-02-22 RX ADMIN — HEPARIN SODIUM 5000 UNITS: 5000 INJECTION INTRAVENOUS; SUBCUTANEOUS at 17:58

## 2022-02-22 RX ADMIN — SODIUM CHLORIDE, PRESERVATIVE FREE 10 ML: 5 INJECTION INTRAVENOUS at 05:13

## 2022-02-22 RX ADMIN — IBUPROFEN 600 MG: 400 TABLET, FILM COATED ORAL at 08:59

## 2022-02-22 RX ADMIN — IBUPROFEN 600 MG: 400 TABLET, FILM COATED ORAL at 21:19

## 2022-02-22 RX ADMIN — PIPERACILLIN AND TAZOBACTAM 3.38 G: 3; .375 INJECTION, POWDER, LYOPHILIZED, FOR SOLUTION INTRAVENOUS at 02:23

## 2022-02-22 RX ADMIN — SODIUM CHLORIDE, SODIUM LACTATE, POTASSIUM CHLORIDE, AND CALCIUM CHLORIDE 100 ML/HR: 600; 310; 30; 20 INJECTION, SOLUTION INTRAVENOUS at 11:10

## 2022-02-22 RX ADMIN — SODIUM CHLORIDE, PRESERVATIVE FREE 10 ML: 5 INJECTION INTRAVENOUS at 13:16

## 2022-02-22 RX ADMIN — ESCITALOPRAM OXALATE 10 MG: 10 TABLET ORAL at 08:52

## 2022-02-22 RX ADMIN — SODIUM CHLORIDE, SODIUM LACTATE, POTASSIUM CHLORIDE, AND CALCIUM CHLORIDE 75 ML/HR: 600; 310; 30; 20 INJECTION, SOLUTION INTRAVENOUS at 23:17

## 2022-02-22 RX ADMIN — HEPARIN SODIUM 5000 UNITS: 5000 INJECTION INTRAVENOUS; SUBCUTANEOUS at 02:23

## 2022-02-22 NOTE — PROGRESS NOTES
Problem: Falls - Risk of  Goal: *Absence of Falls  Description: Document Neema Don Fall Risk and appropriate interventions in the flowsheet. Outcome: Progressing Towards Goal  Note: Fall Risk Interventions:  Mobility Interventions: Patient to call before getting OOB,Bed/chair exit alarm         Medication Interventions: Patient to call before getting OOB,Teach patient to arise slowly    Elimination Interventions:  Toileting schedule/hourly rounds,Patient to call for help with toileting needs,Call light in reach    History of Falls Interventions: Bed/chair exit alarm         Problem: Patient Education: Go to Patient Education Activity  Goal: Patient/Family Education  Outcome: Progressing Towards Goal

## 2022-02-22 NOTE — CONSULTS
Comprehensive Nutrition Assessment    Type and Reason for Visit: Initial,Consult    Nutrition Recommendations/Plan:   - Modify diet: remove diabetic diet restrictions to encourage meal intake  - Update food preferences in diet order  - Add supplement: Ensure Enlive TID  - continue all other nutrition interventions. Encourage/ monitor po intake of meals /supplements. Nutrition Assessment:  Pt has poor meal intake; partly due to appetite and partly due to food preferences, which were discussed. Agreeable to nutrition supplement. Pt asking why she can not receive sugar; explained that she has elevated BG levels upon admission, so was placed on diabetic diet restrictions. Explained to pt that BG are more controlled currently and improving since admission. Will remove diabetic diet restriction to encourage meal intake, but if BG start increasing again, pt understanding that she may need to be placed back onto diabetic diet restrictions. Malnutrition Assessment:  Malnutrition Status:  No malnutrition      Nutrition History and Allergies: Past medical hx:  Breast cancer s/p bilateral breast reconstruction, cholecystectomy. Good appetite/ po intake PTA; eats 2 meals plus snacks daily at home. Pt reported UBW is 233 lb with recent unplanned wt loss, weighing 222 lb. Per chart documentation, noted pt recently weighing 234 lb (via bed scale). Noted weight fluctuation with overall wt gain PTA per chart hx;  Pt weighed 218 lb on 12/31/2015 per chart hx. No known food allergies     Estimated Daily Nutrient Needs:  Energy (kcal): 6782-6198; Weight Used for Energy Requirements: Current (106.5 kg)  Protein (g): 107-138; Weight Used for Protein Requirements: Current (x1-1.3)  Fluid (ml/day): 8647-5975; Method Used for Fluid Requirements: 1 ml/kcal      Nutrition Related Findings:  BM 2/19. no edema. Noted pt has N/VD PTA, but now improved, resolved. Pt feeling better.     Recent BG levels: 105-136 mg/dL in past 24 hours        Wounds:    Surgical incision       Current Nutrition Therapies:  ADULT DIET Regular; 4 carb choices (60 gm/meal)    Anthropometric Measures:  · Height:  5' (152.4 cm)  · Current Body Wt:  106.5 kg (234 lb 12.6 oz)   · Admission Body Wt:   (pt stated)    · Usual Body Wt:  105.7 kg (233 lb) (per pt report)     · Ideal Body Wt:  100 lbs:  234.8 %   · Adjusted Body Weight:   ; Weight Adjustment for: No adjustment   · BMI Category:  Obese class 3 (BMI 40.0 or greater)       Nutrition Diagnosis:   · Inadequate oral intake related to early satiety (food preferences) as evidenced by intake 0-25%,intake 26-50%      Nutrition Interventions:   Food and/or Nutrient Delivery: Modify current diet,Start oral nutrition supplement  Nutrition Education and Counseling: No recommendations at this time,Education not indicated  Coordination of Nutrition Care: Continue to monitor while inpatient    Goals:  PO nutrition intake will meet >75% of patient's estimated nutrition needs within the next follow up date       Nutrition Monitoring and Evaluation:   Behavioral-Environmental Outcomes: None identified  Food/Nutrient Intake Outcomes: Food and nutrient intake,Supplement intake  Physical Signs/Symptoms Outcomes: Biochemical data,Meal time behavior    Discharge Planning:     Too soon to determine     Electronically signed by Abi Woodruff RD on 2/22/2022 at 2:21 PM    Contact: [phone]

## 2022-02-22 NOTE — PROGRESS NOTES
Home health orders sent to [address] [place] [address] was notified.           SAYRA Aguirre RN  Care Management  Pager: [phone]

## 2022-02-22 NOTE — PROGRESS NOTES
Bedside and Verbal shift change report given to Adrianne Swenson RN (oncoming nurse) by Candie Walton RN (offgoing nurse). Report included the following information SBAR, Kardex, Intake/Output, MAR, Recent Results, Med Rec Status and Cardiac Rhythm NSR.     1924: Pt AOX4, denies pain or shortness of breathing, L breast dressing clean, dry and intact, ALVARO drain in place, V/S and shift assessment completed, bed locked and low position, call bell within reach    2120:  - No insulin coverage per protocol, IVF rate changed, infusing well    2232: Vancomycin IV administered    2319: V/S monitoring done, pt voices no c/o, denies pain, dressing dry and intact    0057: Sleeping    0223: Medication administered, pt sleeping comfortably    0331: Sleeping, no change from previous assessment    0530: Bathed provided, linen, pad and gown changed    Bedside and Verbal shift change report given to Candie Walton RN (oncoming nurse) by Adrianne Swenson RN(offgoing nurse). Report included the following information SBAR, Kardex, ED Summary, Intake/Output, MAR, Recent Results, Med Rec Status and Cardiac Rhythm NSR.

## 2022-02-22 NOTE — PROGRESS NOTES
Intern Progress Note  [facility] (formerly [facility])       Patient: Abbey Guerrero MRN: [id number]  CSN: [id number]    YOB: 1995  Age: 31 y.o. Sex: female    DOA: 2/20/2022 LOS:  LOS: 2 days                    Subjective:        Acute events: Pt states she is doing well this am but did have an episode of dizziness on standing this morning on getting to the chair but did not fall or feel light headed. MD asked the patient to ensure PT and OT are informed of this so the can take necessary precautions. Pt was amenable to this. Pt also endorsed still having some diplopia, but that it was much improved for yesterday evening. ROS Pt denies any fevers, CP, SOB, or pain this morning other than previously stated. Objective:     Patient Vitals for the past 12 hrs:   Temp Pulse Resp BP SpO2   02/22/22 0712 97.8 °F (36.6 °C) 78 21 115/63 100 %   02/22/22 0331 97.9 °F (36.6 °C) 71 24 (!) 106/53 100 %   02/21/22 2319 97.6 °F (36.4 °C) 84 20 104/63 99 %          Intake/Output Summary (Last 24 hours) at 2/22/2022 0901  Last data filed at 2/22/2022 1916  Gross per 24 hour   Intake 3175 ml   Output 960 ml   Net 2215 ml       Physical Exam:   General: well-appearing, NAD, AOx3  CV:  RRR, no M/G/R  RESP: Unlabored breathing. Lungs CTAB, no wheezes, rales or rhonchi appreciated. Equal expansion bilaterally. ABD:  Soft, nontender, nondistended. No hepatosplenomegaly. MS:  No joint deformity or instability. No atrophy. Ext:  No edema. 2+ radial and dp pulses bilaterally. Skin: Warm & dry. L chest wall bandage C/D/I, Healing rash on R sided chest from hives pt got after receiving dilaudid per pt.    Psych: normal mood and affect     Lab/Data Reviewed:  Recent Results (from the past 24 hour(s))   ECHO ADULT FOLLOW-UP OR LIMITED    Collection Time: 02/21/22 11:21 AM   Result Value Ref Range    IVSd 0.7 0.6 - 0.9 cm    LVIDd 4.5 3.9 - 5.3 cm    LVIDs 3.0 cm    LVOT Diameter 2.0 cm    LVPWd 0.9 0.6 - 0.9 cm    Aortic Root 2.7 cm    Fractional Shortening 2D 33 28 - 44 %    LVIDd Index 2.28 cm/m2    LVIDs Index 1.52 cm/m2    LV RWT Ratio 0.40     LV Mass 2D 113.6 67 - 162 g    LV Mass 2D Index 57.7 43 - 95 g/m2    LVOT Area 3.1 cm2    Ao Root Index 1.37 cm/m2   GLUCOSE, POC    Collection Time: 02/21/22 11:29 AM   Result Value Ref Range    Glucose (POC) 186 (H) 70 - 110 mg/dL   GLUCOSE, POC    Collection Time: 02/21/22  3:44 PM   Result Value Ref Range    Glucose (POC) 105 70 - 110 mg/dL   GLUCOSE, POC    Collection Time: 02/21/22  8:42 PM   Result Value Ref Range    Glucose (POC) 110 70 - 110 mg/dL   CBC WITH AUTOMATED DIFF    Collection Time: 02/22/22  5:32 AM   Result Value Ref Range    WBC 11.1 4.6 - 13.2 K/uL    RBC 2.89 (L) 4.20 - 5.30 M/uL    HGB 8.0 (L) 12.0 - 16.0 g/dL    HCT 25.4 (L) 35.0 - 45.0 %    MCV 87.9 78.0 - 100.0 FL    MCH 27.7 24.0 - 34.0 PG    MCHC 31.5 31.0 - 37.0 g/dL    RDW 19.9 (H) 11.6 - 14.5 %    PLATELET 490 901 - 073 K/uL    MPV 10.1 9.2 - 11.8 FL    NRBC 0.0 0  WBC    ABSOLUTE NRBC 0.00 0.00 - 0.01 K/uL    NEUTROPHILS 73 40 - 73 %    LYMPHOCYTES 15 (L) 21 - 52 %    MONOCYTES 7 3 - 10 %    EOSINOPHILS 3 0 - 5 %    BASOPHILS 1 0 - 2 %    IMMATURE GRANULOCYTES 2 (H) 0.0 - 0.5 %    ABS. NEUTROPHILS 8.0 1.8 - 8.0 K/UL    ABS. LYMPHOCYTES 1.7 0.9 - 3.6 K/UL    ABS. MONOCYTES 0.8 0.05 - 1.2 K/UL    ABS. EOSINOPHILS 0.3 0.0 - 0.4 K/UL    ABS. BASOPHILS 0.1 0.0 - 0.1 K/UL    ABS. IMM.  GRANS. 0.2 (H) 0.00 - 0.04 K/UL    DF AUTOMATED     METABOLIC PANEL, COMPREHENSIVE    Collection Time: 02/22/22  5:32 AM   Result Value Ref Range    Sodium 141 136 - 145 mmol/L    Potassium 3.6 3.5 - 5.5 mmol/L    Chloride 109 100 - 111 mmol/L    CO2 26 21 - 32 mmol/L    Anion gap 6 3.0 - 18 mmol/L    Glucose 126 (H) 74 - 99 mg/dL    BUN 6 (L) 7.0 - 18 MG/DL    Creatinine 0.74 0.6 - 1.3 MG/DL    BUN/Creatinine ratio 8 (L) 12 - 20      GFR est AA >60 >60 ml/min/1.73m2    GFR est non-AA >60 >60 ml/min/1.73m2 Calcium 8.0 (L) 8.5 - 10.1 MG/DL    Bilirubin, total 0.4 0.2 - 1.0 MG/DL    ALT (SGPT) 17 13 - 56 U/L    AST (SGOT) 10 10 - 38 U/L    Alk. phosphatase 80 45 - 117 U/L    Protein, total 5.0 (L) 6.4 - 8.2 g/dL    Albumin 1.6 (L) 3.4 - 5.0 g/dL    Globulin 3.4 2.0 - 4.0 g/dL    A-G Ratio 0.5 (L) 0.8 - 1.7     GLUCOSE, POC    Collection Time: 02/22/22  7:20 AM   Result Value Ref Range    Glucose (POC) 136 (H) 70 - 110 mg/dL       Assessment & Plan:     31 y.o. female with PMH breast cancer status post bilateral mastectomies with implant placement, most recently cholecystitis requiring cholecystectomy on 2/8/2022 presenting w/ syncope and abdominal pain.         Severe sepsis secondary to the left breast abscess   Now s/p removal of tissue expander of L breast with drainage and irrigation on 2/20   In ED: sinus tachycardia 140s-150s, lactic acidosis(~5), hyponatremia(128 baseline 137) VESTA (Cr 1.4 baseline 0.8), hyperglycemia (258)  Unclear why patient has left breast abscess since her breast surgery had been several months ago. Will rule out any abdominal source of infection since she recently had a gallbladder surgery about 10 days ago. - ICU was consulted recommended continuing to resuscitate patient with IV fluids. No need to transfer to ICU currently per ICU  -Lactic acid from 5.25->4.36-> 2.4  -Status post 5 L of fluid     Plan     -Continue 150 cc/h of LR  -continue Zosyn, continue vancomycin  -Monitor for signs of fluid overload respiratory status. Patient is young no history of CHF. Ordered proBNP, ICU ordered echo. Last echo looks like it is within normal limits  -Lactic acid every every 4 hours  -Follow-up on blood cultures urine cultures and pro-Mehran  - CT abdomen showed:  Trace residual fluid in the gallbladder fossa, decreased from previous. No  finding for postoperative abscess in the abdomen or pelvis.   -zofran 4mg IV prn (held po zofran)    Elevated BG  -250's-270's  -A1c 6.1% this admission  -on no home meds for T2DM, as pt states she is not Diabetic   Plan:  -TIDAC blood sugar checks  -Humalog 4U once  -started low dose SSI    #syncope+ fall possibly from orthostatic/ sepsis  EKG no ST changes trop X1 negative     Plan:   -Consider a CT head, if patient has persistent emesis or complains of HA  -May need imaging of /left hip if patient has continued pain     Breast cancer s/p bilateral mastectomy w implant  -consider reaching out to heme onc   -hold chemo po med capecitabine 500mg twice a day suppose to be on it starting 2/23        Asthma  - cont prn albuterol     Depression  -cont lexapro 10mg daily     Bladder spasms  - hold oxybutynin         Diet  Adult   DVT Prophylaxis   SQH    GI Prophylaxis  none   Code status  Full   Disposition  TBD     Point of Contact Sooshena Jarvis   Relationship:   [phone]      Igor Schaefer MD , PGY-1   [facility] Medicine   February 22, 2022, 8:22 AM

## 2022-02-22 NOTE — PROGRESS NOTES
Post-rounding Note  [facility] Medicine    SUBJECTIVE     Patient was seen at bedside after call for spell of diplopia and dizziness. Patient states that she began to have double vision and some dizziness while working with PT this evening. PT states that she worked with OT earlier and was able to walk to and from the bathroom without symptoms but when walking a longer distance from her room to the nursing station with PT she started to experience diplopia and dizziness on her way back from the nursing station. On eval this evening pt states that she is still having some diploplia but that symptoms have improved with rest. Pt denies any CP, palpitations, SOB, Lightheadedness, or Dizziness. Physical Exam:    CV exam: RRR, no MRG  Resp exam: Unlabored breathing. Lungs CTAB, no wheezes, rales or rhonchi appreciated. Equal expansion bilaterally. Neuro Exam: normal without focal findings, CN2-12 intact,  mental status normal, speech normal, alert and oriented x 3  GAETANO      OBJECTIVE  Visit Vitals  BP (!) 108/53 (BP 1 Location: Right leg, BP Patient Position: At rest)   Pulse 81   Temp 97.9 °F (36.6 °C)   Resp 16   Ht 5' (1.524 m)   Wt 103 kg (227 lb)   SpO2 100%   BMI 44.33 kg/m²           See daily progress note for full assessment/plan.      Padma Gutierrez MD, PGY-1   [facility] Family Medicine   February 21, 2022, 8:02 PM

## 2022-02-22 NOTE — PROGRESS NOTES
0800:  Report received, care assumed. Complete assessment performed. AAOx4. C/o incisional pain, see mar for meds given; continue ice pack to site; ALVARO remains intact to bulb suction. Assisted OOB to recliner. Breakfast served. Call bell and phone at side. Full fall and aspiration precautions in effect. 0900:  Poor PO intake due to food preferences/ likes/ dislikes. Denies N/V. Dietary dept called by this RN, requested additional food   1100:  PFM providers on rounds, new order for head CT noted. Pt aware, agreeable plan of care. 1140:   gemma blood culture as ordered. To CT via stretcher for testing. 1220:  Returned to room s/p CT scan. Ambulated to toilet for void using rolling walker, RN on standby; then to recliner chair, legs elevated. Ice to surgical site. Dietitian on consult. Lunch served. 1530:  Bedside and Verbal shift change report given to DAIANA Vázquez (oncoming nurse) by Hayes Villalobos RN(offgoing nurse). Report included the following information SBAR, Kardex, Intake/Output, MAR, Accordion, Recent Results, Cardiac Rhythm NSR. and Alarm Parameters .

## 2022-02-22 NOTE — PROGRESS NOTES
[address] [facility] Pharmacokinetic Monitoring Service - Vancomycin    Indication: SSTI  Target Concentration: Goal AUC/MICHELLE 400-600 mg*hr/L  Day of Therapy: 3  Additional Antimicrobials: pip-tazo    Pertinent Laboratory Values: Wt Readings from Last 1 Encounters:   02/22/22 106.5 kg (234 lb 14.4 oz)     Temp Readings from Last 1 Encounters:   02/22/22 97.6 °F (36.4 °C)     No components found for: PROCAL  Estimated Creatinine Clearance: 126 mL/min (based on SCr of 0.74 mg/dL). Recent Labs     02/22/22  0532 02/21/22  0710   WBC 11.1 10.8       Pertinent Cultures:  Culture Date Source Results   02/20 02/20 Blood  wound NGTD  GPC pairs   MRSA Nasal Swab: N/A. Non-respiratory infection. .    Assessment:  Date/Time Current Dose Concentration Timing of Concentration (h) AUC   02/21 750 mg q12h 13.2 mg/L 8 hr post dose 352   02/22 1,500 mg q18h NA NA NA   Note: Serum concentrations collected for AUC dosing may appear elevated if collected in close proximity to the dose administered, this is not necessarily an indication of toxicity    Plan:  1. Continue current dose of 1,500 mg q18h  2. Ordered a level for 02/23 with AM labs  3.  Pharmacy will continue to monitor patient and adjust therapy as indicated    Thank you for the consult,  FANNIE Watson  2/22/2022

## 2022-02-22 NOTE — HOME CARE
Received HH referral for SN,PT,OT; spoke to patient in room,Verified demographics,explained [facility] services and answered all questions ; [facility] will follow. CHE SOTO.

## 2022-02-22 NOTE — PROGRESS NOTES
Reason for Readmission:    Sepsis ([facility] [address] [A41.9]  Breast abscess [N61.1]  Abscess of left breast [N61.1]         RUR Score and Risk Level:      15     Level of Readmission:    2   (1-3)   (1 - First Readmission, 2- Second Readmission within 30 days or multiple admissions over previous 90 days, 3- Greater than two readmissions within 30 days or multiple admissions over previous 90 days)      Care Conference scheduled:   (consider for all patient's with readmission level of 2, should definitely be scheduled for all patients with readmission level of 3)       Resources/supports as identified by patient/family:         Top Challenges facing patient (as identified by patient/family and CM): Finances/Medication cost?     No   Transportation      Self   Support system or lack thereof? Friend, brother and   Living arrangements? Lives alone   Self-care/ADLs/Cognition? Independent, AOx4        Current Advanced Directive/Advance Care Plan:        Healthcare Decision Maker:   Primary Decision Maker: Nadine Mendezmiguel ángel Elsa  - [phone]    Secondary Decision Maker: Julian Veras - [phone]    Click here to complete [address] including selection of the Healthcare Decision Maker Relationship (ie \"Primary\")           Plan for utilizing home health:   yes. Likelihood of additional readmission:                Transition of Care Plan:    Based on readmission, the patient's previous Plan of Care   has been evaluated and/or modified. The current Transition of Care Plan is:            Initial assessment completed with patient. Cognitive status of patient: oriented to time, place, person and situation. Face sheet information confirmed:  yes. The patient designates her friend Nba García to participate in her discharge plan and to receive any needed information. This patient lives in a apartment alone. Patient is able to navigate steps as needed.   Prior to hospitalization, patient was considered to be independent with ADLs/IADLS : yes . I    Patient has a current  document on file: no  The family memberwill be available to transport patient home upon discharge. The patient already has none reported medical equipment available in the home. Patient is not currently active with home health. Patient has not stayed in a skilled nursing facility or rehab. Was  stay within last 60 days : no. This patient is on dialysis :no    List of available Home Health agencies were provided and reviewed with the patient prior to discharge. Freedom of choice signed: yes, for [address]. Currently, the discharge plan is Home with [facility]. The patient states that she can obtain her medications from the pharmacy, and take her medications as directed. Patient's current insurance is Medicaid. Care Management Interventions  PCP Verified by CM: Yes  Mode of Transport at Discharge:  Other (see comment) (family)  Transition of Care Consult (CM Consult): Discharge [address]: Yes  Physical Therapy Consult: Yes  Occupational Therapy Consult: Yes  Support Systems: Friend/Neighbor,/,Other Family Member(s)  Confirm Follow Up Transport: Self  The Patient and/or Patient Representative was Provided with a Choice of Provider and Agrees with the Discharge Plan?: Yes  Freedom of Choice List was Provided with Basic Dialogue that Supports the Patient's Individualized Plan of Care/Goals, Treatment Preferences and Shares the Quality Data Associated with the Providers?: Yes  Discharge Location  Patient Expects to be Discharged to[de-identified] Home with home health      Readmission Assessment  Number of days since last admission?: 8-30 days  Previous disposition: Other (comment) (home with friend)  Who is being interviewed?: Patient  What was the patient's/caregiver's perception as to why they think they needed to return back to the hospital?: Other (Comment) (per pt, she was having pain in her left breast)  Did you visit your Primary Care Physician after you left the hospital, before you returned this time?: No  Why weren't you able to visit your PCP?: Did not have an appointment  Did you see a specialist, such as Cardiac, Pulmonary, Orthopedic Physician, etc. after you left the hospital?: No (appoitment with Dr. Leydi Peñaloza was Feb. 25th)  Who advised the patient to return to the hospital?: Self-referral  Does the patient report anything that got in the way of taking their medications?: No  In our efforts to provide the best possible care to you and others like you, can you think of anything that we could have done to help you after you left the hospital the first time, so that you might not have needed to return so soon?: Other (Comment) (arrange home health)      SAYRA Nelson RN  Care Management  Pager: [phone]

## 2022-02-22 NOTE — PROGRESS NOTES
Brief Progress Note  [facility]      Subjective:      Pt was seen resting in bed this evening, awake, alert and smiling. States her pain is currently controlled. Informs me her dizziness has resolved, and blurry vision has improved. She tells me she ate half of her dinner tonight. Denies CP, SOB, abd pain,BLE swelling. Seen on room air. Informs me she worked with PT and OT today. Endorsing some sweats, no chills. Objective:     Visit Vitals  BP (!) 108/53 (BP 1 Location: Right leg, BP Patient Position: At rest)   Pulse 81   Temp 97.9 °F (36.6 °C)   Resp 16   Ht 5' (1.524 m)   Wt 103 kg (227 lb)   SpO2 100%   BMI 44.33 kg/m²     Physical Exam:   General: well-appearing, NAD  HEENT: Conjunctiva pink, sclera anicteric. EOMI   CV:  RRR, no M/G/R  RESP: Unlabored breathing. Lungs CTAB, no wheezes, rales or rhonchi appreciated. Equal expansion bilaterally. Neuro:  Sensation grossly intact. A+Ox3. Ext:  No edema. 2+ radial and dp pulses bilaterally. Skin: L chest wall bandage C/D/I. Drain in place with adequate drainage. Psych: normal mood and affect     Assessment & Plan:   1. Severe sepsis secondary to the left breast abscess   Now s/p removal of tissue expander of L breast with drainage and irrigation on 2/20  -pain controlled with ibuprofen 600mg [id number] to acetaminophen, dialudid  -decrease IVF to 100cc/hr as pt has improved PO intake, decrease as appropriate  -wound cx w/ G+ cocci in pairs  -blood cx gram stain: G+ cocci in pairs, pending culture  -cont Vanc, Zosyn --> FU ID recs  -monitor temps, BP's and BG's  -most recent , may hold evening Lantus if low again  -Echo EF 55-60% today  -drain in place; FU with  in 1 week    For full assessment and plan, please see daily progress note.      Sydnee Blunt MD, PGY-1   [facility] Medicine   February 21, 2022, 7:55 PM

## 2022-02-22 NOTE — PROGRESS NOTES
Problem: Mobility Impaired (Adult and Pediatric)  Goal: *Acute Goals and Plan of Care (Insert Text)  Description: Physical Therapy Goals  Initiated 2/21/2022 and to be accomplished within 7 day(s)  1. Patient will move from supine to sit and sit to supine , scoot up and down, and roll side to side in bed with modified independence. 2.  Patient will transfer from bed to chair and chair to bed with modified independence using the least restrictive device. 3.  Patient will perform sit to stand with modified independence. 4.  Patient will ambulate with modified independence for 400 feet with the least restrictive device. PLOF: Pt reporting she lives alone in 1 story house with 0 SHITAL. Independent PTA. Pt has friend support as needed per pt report. Outcome: Progressing Towards Goal   PHYSICAL THERAPY TREATMENT    Patient: Kevin Boudreaux (31 y.o. female)  Date: 2/22/2022  Diagnosis: Sepsis ([facility] [address] [A41.9]  Breast abscess [N61.1]  Abscess of left breast [N61.1] <principal problem not specified>  Procedure(s) (LRB):  REMOVAL OF TISSUE EXPANDER LEFT BREAST, PLACEMENT OF DRAIN (Left) 2 Days Post-Op  Precautions: Fall (s/p removal tissue L breast - no push/pull/heavy lifting )    ASSESSMENT:  Pt continues to make functional progression with transfers and sitting and standing balance as pt req less assistance when transferring from chair to [facility] to bed. Further mobility was limited by BP fluctuations (see measurements below) during mobility, therefore further gait training held today and limited pt to [facility] use for added safety. Pt was asymptotic throughout session despite lower BP measurements with the exception of supine to long sit position where pt voiced increased lightheadedness which quickly passed once resting in supine.  Pt also guided through mobility tasks this visit with intent to decrease stress and pain through left UE via proper head/hips strategy to scoot in sitting by utilizing left LE > LUE and during rolling and sup to sit by again using LLE as primary means to obtain SL position. Pt positioned in supine and left in room with all needs in reach in NAD. Nursing notified of pt status and BP changes throughout session. BP (taken in right UE - BP in LUE is contraindicated) - SPO2, HR, and RR all WNL t/o session  Semi-reclined in recliner: 103/45 mmHg  Short sitting at edge of chair: 93/55 mmHg  Post BSC transfer: 87/76 mmHg  Supine post BSC use: 100/57 mmHg   immediately post long sitting/bed mobility:100/50 mmHg  Post 5 min in trendelenburg position (taken at right calf): 112/86 mmHg    Progression toward goals: good   [x]      Improving appropriately and progressing toward goals  []      Improving slowly and progressing toward goals  []      Not making progress toward goals and plan of care will be adjusted     PLAN:  Patient continues to benefit from skilled intervention to address the above impairments. Continue treatment per established plan of care. Discharge Recommendations:  Home Physical Therapy with family support   Further Equipment Recommendations for Discharge:  rolling walker     SUBJECTIVE:   Patient stated I don't have any of the double vision today.     OBJECTIVE DATA SUMMARY:   Critical Behavior:  Neurologic State: Alert,Appropriate for age,Eyes open spontaneously  Orientation Level: Oriented X4  Cognition: Follows commands  Safety/Judgement: Fall prevention  Functional Mobility Training:  Bed Mobility:  Rolling: Modified independent  Supine to Sit: Modified independent  Sit to Supine: Modified independent  Scooting: Modified independent  Transfers:  Sit to Stand: Stand-by assistance  Stand to Sit: Stand-by assistance  Balance:  Sitting: Intact  Standing: Impaired; With support; Without support  Standing - Static: Good  Standing - Dynamic : Fair (+)  Ambulation/Gait Training:  Distance (ft): 5 Feet (ft) (X2)  Assistive Device:  (HHA)  Ambulation - Level of Assistance: Contact guard assistance  Gait Abnormalities: Decreased step clearance;Trunk sway increased  Base of Support: Narrowed  Speed/Iza: Slow  Step Length: Right shortened;Left shortened  Therapeutic Exercises:       EXERCISE   Sets   Reps   Active Active Assist   Passive Self ROM   Comments   Ankle Pumps 1 10  [x] [] [] []    Quad Sets/Glut Sets    [] [] [] [] Hold for 5 secs   Hamstring Sets   [] [] [] []    Short Arc Quads   [] [] [] []    Heel Slides   [] [] [] []    Straight Leg Raises   [] [] [] []    Hip Add   [] [] [] [] Hold for 5 secs, w/ pillow squeeze   Long Arc Quads   [] [] [] []    Seated Marching   [] [] [] []    Standing Marching   [] [] [] []       [] [] [] []        Pain:  Pain level pre-treatment: 0/10  Pain level post-treatment: 0/10   Pain in left chest/arm surgical site when pulling in recliner and when rolling in bed     Activity Tolerance:   Good   Please refer to the flowsheet for vital signs taken during this treatment. After treatment:   [x] Patient left in no apparent distress sitting up in chair  [] Patient left in no apparent distress in bed  [x] Call bell left within reach  [x] Nursing notified  [] Caregiver present  [] Bed alarm activated  [] SCDs applied      COMMUNICATION/EDUCATION:   [x]         Role of Physical Therapy in the acute care setting. [x]         Fall prevention education was provided and the patient/caregiver indicated understanding. [x]         Patient/family have participated as able in working toward goals and plan of care. [x]         Patient/family agree to work toward stated goals and plan of care. []         Patient understands intent and goals of therapy, but is neutral about his/her participation.   []         Patient is unable to participate in stated goals/plan of care: ongoing with therapy staff.  []         Other:        Moe Garsia PTA   Time Calculation: 42 mins

## 2022-02-22 NOTE — CONSULTS
Infectious Disease Consultation Note        Reason: left breast tissue expander infection, gram-positive bacteremia    Current abx Prior abx   zosyn, vancomycin since 2/20/22  cefepime 2/20     Lines:       Assessment :    31year old woman with left triple negative berast cancer, s/p bilateral mastectomy 8/26/21,  lap barrie  2/7/22 presented to [facility] on 2/20/2022 with left breast pain    Now with gram-positive bacteremia    Clinical presentation consistent with severe sepsis-present on admission due to gram-positive bloodstream infection, left breast tissue expander infection/left breast  Patient has been appropriately managed with removal of tissue expander left breast on 2/20/2022  Intra-Op cultures 2/20-Staph aureus    Most likely source of gram-positive bloodstream infection is left breast tissue expander infection    Right-sided abdominal pain-likely postoperative changes due to recent cholecystectomy    Antibiotic management complicated due to documented history of penicillin allergy-patient does not think she has had any allergic reaction to penicillin. States that she has family history of penicillin allergy. Does not know the nature of allergic reaction. Currently has tolerated cefepime, piperacillin/tazobactam without difficulties-willing to use penicillin derivatives if needed    Recommendations:    1. Discontinue piperacillin/tazobactam.  Continue vancomycin  2. Follow-up susceptibility of staph aureus in wound culture, follow-up blood cultures and modify antibiotics accordingly  3. Repeat blood culture today to determine clearance of bloodstream infection  4. Follow-up surgery recommendations  5. Will need to make decision about outpatient IV versus oral antibiotics depending on the identification of gram-positive cocci in blood culture.   If isolate in blood culture is Staph aureus, patient will need outpatient IV antibiotic    Thank you for consultation request. Above plan was discussed in details with patient, primary team. Please call me if any further questions or concerns. Will continue to participate in the care of this patient. HPI:    31year old woman with left triple negative berast cancer, s/p bilateral mastectomy 8/26/21,  lap barrie  2/7/22 presented to [facility] on 2/20/2022 with left breast pain. She completed neoadjuvant chemotherapy. Plan had been for implant exchange, however due to severe abdominal pain attributed to recent lap barrie, it was canceled. She presented to the ED on 2/20/22 with abdominal pain and lightheadness. She reports recent fall. Her left breast was swollen and painful with fluid collection on imaging. She was felt to be septic from infected expander, possibly infected hematoma from recent trauma. She was evaluated by breast surgeon. Status post removal of tissue expander left breast on 2/20/2022. Patient was started on broad-spectrum antibiotics since admission. I have been consulted for further recommendations. Patient states that she had pain for several days prior to presentation. She also has some abdominal discomfort. She recollects having fever and chills for few days prior to presentation. No prior history of MRSA colonization or infection.     Past Medical History:   Diagnosis Date    Asthma     last attack years ago    Cancer [facility])     BREAST    Chronic pain of left thumb        Past Surgical History:   Procedure Laterality Date    HAND/FINGER SURGERY UNLISTED      left thumb    HX BREAST BIOPSY      HX BREAST RECONSTRUCTION Bilateral 8/26/2021    BILATERAL BREAST RECONSTRUCTION WITH TISSUE EXPANDERS & ADIPOFASCIAL FLAPS performed by Zahra Varela MD at [address]  [facility]  2016    HX LAP CHOLECYSTECTOMY      HX MASTECTOMY Bilateral 8/26/2021    BILATERAL SKIN SPARING MASTECTOMY; LEFT SENTINEL NODE BIOPSY performed by Simba Hernandez MD at [address] Medication List    Details   ergocalciferol (Vitamin D2) 1,250 mcg (50,000 unit) capsule Take 50,000 Units by mouth every seven (7) days. escitalopram oxalate (Lexapro) 10 mg tablet Take 10 mg by mouth daily. loratadine 10 mg cap Take 10 mg by mouth. ondansetron hcl (ZOFRAN) 8 mg tablet Take 8 mg by mouth every eight (8) hours as needed for Nausea or Vomiting. OXYBUTYNIN CHLORIDE PO Take 5 mg by mouth daily. albuterol (PROVENTIL HFA, VENTOLIN HFA, PROAIR HFA) 90 mcg/actuation inhaler Take 1 Puff by inhalation every four (4) hours as needed for Wheezing.   Qty: 1 Inhaler, Refills: 0             Current Facility-Administered Medications   Medication Dose Route Frequency    heparin (porcine) injection 5,000 Units  5,000 Units SubCUTAneous Q8H    [Held by provider] insulin glargine (LANTUS) injection 5 Units  5 Units SubCUTAneous QHS    insulin lispro (HUMALOG) injection   SubCUTAneous AC&HS    vancomycin (VANCOCIN) 1500 mg in  ml infusion  1,500 mg IntraVENous Q18H    ibuprofen (MOTRIN) tablet 600 mg  600 mg Oral Q6H PRN    sodium chloride (NS) flush 5-10 mL  5-10 mL IntraVENous PRN    lactated Ringers infusion  100 mL/hr IntraVENous CONTINUOUS    piperacillin-tazobactam (ZOSYN) 3.375 g in 0.9% sodium chloride (MBP/ADV) 100 mL MBP  3.375 g IntraVENous Q8H    albuterol (PROVENTIL HFA, VENTOLIN HFA, PROAIR HFA) inhaler 1 Puff  1 Puff Inhalation Q4H PRN    [Held by provider] ergocalciferol capsule 50,000 Units  50,000 Units Oral Q7D    escitalopram oxalate (LEXAPRO) tablet 10 mg  10 mg Oral DAILY    cetirizine (ZYRTEC) tablet 10 mg  10 mg Oral DAILY    [Held by provider] ondansetron hcl (ZOFRAN) tablet 8 mg  8 mg Oral Q8H PRN    [Held by provider] oxybutynin (DITROPAN) tablet 5 mg  5 mg Oral DAILY    sodium chloride (NS) flush 5-40 mL  5-40 mL IntraVENous Q8H    sodium chloride (NS) flush 5-40 mL  5-40 mL IntraVENous PRN    ondansetron (ZOFRAN) injection 4 mg  4 mg IntraVENous Q6H PRN    glucose chewable tablet 16 g  16 g Oral PRN    glucagon (GLUCAGEN) injection 1 mg  1 mg IntraMUSCular PRN    dextrose 10% infusion 0-250 mL  0-250 mL IntraVENous PRN       Allergies: Percocet [oxycodone-acetaminophen], Tylenol [acetaminophen], Dilaudid [hydromorphone], Penicillins, and Gadavist [gadobutrol]    Family History   Problem Relation Age of Onset    Diabetes Mother     No Known Problems Father      Social History     Socioeconomic History    Marital status: SINGLE     Spouse name: Not on file    Number of children: Not on file    Years of education: Not on file    Highest education level: Not on file   Occupational History    Not on file   Tobacco Use    Smoking status: Never Smoker    Smokeless tobacco: Never Used   Substance and Sexual Activity    Alcohol use: Yes     Comment: OCASSIONAL    Drug use: Never    Sexual activity: Yes     Birth control/protection: None   Other Topics Concern     Service Not Asked    Blood Transfusions Not Asked    Caffeine Concern Not Asked    Occupational Exposure Not Asked    Hobby Hazards Not Asked    Sleep Concern Not Asked    Stress Concern Not Asked    Weight Concern Not Asked    Special Diet Not Asked    Back Care Not Asked    Exercise Not Asked    Bike Helmet Not Asked    Seat Belt Not Asked    Self-Exams Not Asked   Social History Narrative    Not on file     Social Determinants of Health     Financial Resource Strain:     Difficulty of Paying Living Expenses: Not on file   Food Insecurity:     Worried About Running Out of Food in the Last Year: Not on file    [place] of Food in the Last Year: Not on file   Transportation Needs:     Lack of Transportation (Medical): Not on file    Lack of Transportation (Non-Medical):  Not on file   Physical Activity:     Days of Exercise per Week: Not on file    Minutes of Exercise per Session: Not on file   Stress:     Feeling of Stress : Not on file   Social Connections:     Frequency of Communication with Friends and Family: Not on file    Frequency of Social Gatherings with Friends and Family: Not on file    Attends Restorationist Services: Not on file    Active Member of Clubs or Organizations: Not on file    Attends Club or Organization Meetings: Not on file    Marital Status: Not on file   Intimate Partner Violence:     Fear of Current or Ex-Partner: Not on file    Emotionally Abused: Not on file    Physically Abused: Not on file    Sexually Abused: Not on file   Housing Stability:     Unable to Pay for Housing in the Last Year: Not on file    Number of [place] in the Last Year: Not on file    Unstable Housing in the Last Year: Not on file     Social History     Tobacco Use   Smoking Status Never Smoker   Smokeless Tobacco Never Used        Temp (24hrs), Av.6 °F (36.4 °C), Min:97.2 °F (36.2 °C), Max:97.9 °F (36.6 °C)    Visit Vitals  /63   Pulse 78   Temp 97.8 °F (36.6 °C)   Resp 21   Ht 5' (1.524 m)   Wt 106.5 kg (234 lb 14.4 oz)   SpO2 100%   BMI 45.88 kg/m²       ROS: 12 point ROS obtained in details. Pertinent positives as mentioned in HPI,   otherwise negative    Physical Exam:    General: Well developed, well nourished female /sitting on the  bed AAOx3 in no acute distress. General:   awake alert and oriented   HEENT:  Normocephalic, atraumatic,  EOMI, no scleral icterus or pallor; no conjunctival hemmohage;  nasal and oral mucous are moist and without evidence of lesions. Neck supple, no bruits. Lymph Nodes:   not examined   Lungs:   non-labored, bilaterally clear to auscultation- no crackles wheezes rales or rhonchi   Heart:  RRR, s1 and s2; no rubs or gallops, no edema   Abdomen:  soft, non-distended, active bowel sounds, no hepatomegaly, no splenomegaly. Right upper/lower quadrant tenderness-no guarding/rigidity   Genitourinary:  deferred   Extremities:   no clubbing, cyanosis; no joint effusions or swelling;  Full ROM of all large joints to the upper and lower extremities; muscle mass appropriate for age   Neurologic:  No gross focal sensory abnormalities; 5/5 muscle strength to upper and lower extremities. Speech appropriate. Cranial nerves intact                        Skin:  Surgical changes on chest   Back:  no spinal or paraspinal muscle tenderness or rigidity, no CVA tenderness     Psychiatric:  No suicidal or homicidal ideations, appropriate mood and affect         Labs: Results:   Chemistry Recent Labs     02/21/22  0710 02/20/22  0940   * 365*    128*   K 4.1 4.7    96*   CO2 25 22   BUN 7 9   CREA 0.95 1.42*   CA 8.1* 9.0   AGAP 7 10   BUCR 7* 6*   AP 87 128*   TP 5.2* 7.4   ALB 1.7* 2.6*   GLOB 3.5 4.8*   AGRAT 0.5* 0.5*      CBC w/Diff Recent Labs     02/22/22  0532 02/21/22  0710 02/20/22  0940   WBC 11.1 10.8 13.7*   RBC 2.89* 2.82* 3.86*   HGB 8.0* 8.0* 10.5*   HCT 25.4* 24.8* 33.2*    296 454*   GRANS 73 43 77*   LYMPH 15* 23 11*   EOS 3 5 0      Microbiology Recent Labs     02/20/22  2119 02/20/22  1048 02/20/22  0940   CULT PENDING CULTURE IN PROGRESS,FURTHER UPDATES TO FOLLOW  Sent to [facility] for ID/Susceptibility if indicated. NO GROWTH 2 DAYS          RADIOLOGY:    All available imaging studies/reports in [facility] for this admission were reviewed      Disclaimer: Sections of this note are dictated utilizing voice recognition software, which may have resulted in some phonetic based errors in grammar and contents. Even though attempts were made to correct all the mistakes, some may have been missed, and remained in the body of the document. If questions arise, please contact our department.     Dr. Shanel Vu, Infectious Disease Specialist  [phone]  February 22, 2022  7:23 AM

## 2022-02-23 LAB
ALBUMIN SERPL-MCNC: 1.7 G/DL (ref 3.4–5)
ALBUMIN/GLOB SERPL: 0.5 {RATIO} (ref 0.8–1.7)
ALP SERPL-CCNC: 73 U/L (ref 45–117)
ALT SERPL-CCNC: 17 U/L (ref 13–56)
ANION GAP SERPL CALC-SCNC: 5 MMOL/L (ref 3–18)
AST SERPL-CCNC: 11 U/L (ref 10–38)
BACTERIA SPEC CULT: ABNORMAL
BACTERIA SPEC CULT: ABNORMAL
BACTERIA SPEC CULT: NORMAL
BASOPHILS # BLD: 0 K/UL (ref 0–0.1)
BASOPHILS NFR BLD: 0 % (ref 0–2)
BILIRUB SERPL-MCNC: 0.5 MG/DL (ref 0.2–1)
BUN SERPL-MCNC: 4 MG/DL (ref 7–18)
BUN/CREAT SERPL: 5 (ref 12–20)
CALCIUM SERPL-MCNC: 8.3 MG/DL (ref 8.5–10.1)
CHLORIDE SERPL-SCNC: 110 MMOL/L (ref 100–111)
CO2 SERPL-SCNC: 27 MMOL/L (ref 21–32)
CREAT SERPL-MCNC: 0.76 MG/DL (ref 0.6–1.3)
DIFFERENTIAL METHOD BLD: ABNORMAL
EOSINOPHIL # BLD: 0.3 K/UL (ref 0–0.4)
EOSINOPHIL NFR BLD: 3 % (ref 0–5)
ERYTHROCYTE [DISTWIDTH] IN BLOOD BY AUTOMATED COUNT: 20 % (ref 11.6–14.5)
GLOBULIN SER CALC-MCNC: 3.3 G/DL (ref 2–4)
GLUCOSE SERPL-MCNC: 115 MG/DL (ref 74–99)
GRAM STN SPEC: ABNORMAL
GRAM STN SPEC: ABNORMAL
HCT VFR BLD AUTO: 24.9 % (ref 35–45)
HGB BLD-MCNC: 7.9 G/DL (ref 12–16)
IMM GRANULOCYTES # BLD AUTO: 0 K/UL
IMM GRANULOCYTES NFR BLD AUTO: 0 %
LYMPHOCYTES # BLD: 2.1 K/UL (ref 0.9–3.6)
LYMPHOCYTES NFR BLD: 20 % (ref 21–52)
MCH RBC QN AUTO: 28 PG (ref 24–34)
MCHC RBC AUTO-ENTMCNC: 31.7 G/DL (ref 31–37)
MCV RBC AUTO: 88.3 FL (ref 78–100)
MONOCYTES # BLD: 1.3 K/UL (ref 0.05–1.2)
MONOCYTES NFR BLD: 13 % (ref 3–10)
NEUTS BAND NFR BLD MANUAL: 2 % (ref 0–5)
NEUTS SEG # BLD: 6.6 K/UL (ref 1.8–8)
NEUTS SEG NFR BLD: 62 % (ref 40–73)
NRBC # BLD: 0.02 K/UL (ref 0–0.01)
NRBC BLD-RTO: 0.2 PER 100 WBC
PLATELET # BLD AUTO: 345 K/UL (ref 135–420)
PLATELET COMMENTS,PCOM: ABNORMAL
PMV BLD AUTO: 9.9 FL (ref 9.2–11.8)
POTASSIUM SERPL-SCNC: 3.7 MMOL/L (ref 3.5–5.5)
PROT SERPL-MCNC: 5 G/DL (ref 6.4–8.2)
RBC # BLD AUTO: 2.82 M/UL (ref 4.2–5.3)
RBC MORPH BLD: ABNORMAL
SERVICE CMNT-IMP: ABNORMAL
SERVICE CMNT-IMP: NORMAL
SODIUM SERPL-SCNC: 142 MMOL/L (ref 136–145)
VANCOMYCIN SERPL-MCNC: 17.4 UG/ML (ref 5–40)
WBC # BLD AUTO: 10.3 K/UL (ref 4.6–13.2)

## 2022-02-23 PROCEDURE — 74011250636 HC RX REV CODE- 250/636: Performed by: FAMILY MEDICINE

## 2022-02-23 PROCEDURE — 85025 COMPLETE CBC W/AUTO DIFF WBC: CPT

## 2022-02-23 PROCEDURE — 80202 ASSAY OF VANCOMYCIN: CPT

## 2022-02-23 PROCEDURE — 74011000250 HC RX REV CODE- 250: Performed by: STUDENT IN AN ORGANIZED HEALTH CARE EDUCATION/TRAINING PROGRAM

## 2022-02-23 PROCEDURE — 65270000029 HC RM PRIVATE

## 2022-02-23 PROCEDURE — 97116 GAIT TRAINING THERAPY: CPT

## 2022-02-23 PROCEDURE — 36415 COLL VENOUS BLD VENIPUNCTURE: CPT

## 2022-02-23 PROCEDURE — 74011250637 HC RX REV CODE- 250/637: Performed by: STUDENT IN AN ORGANIZED HEALTH CARE EDUCATION/TRAINING PROGRAM

## 2022-02-23 PROCEDURE — 97535 SELF CARE MNGMENT TRAINING: CPT

## 2022-02-23 PROCEDURE — 74011250636 HC RX REV CODE- 250/636

## 2022-02-23 PROCEDURE — 2709999900 HC NON-CHARGEABLE SUPPLY

## 2022-02-23 PROCEDURE — 80053 COMPREHEN METABOLIC PANEL: CPT

## 2022-02-23 PROCEDURE — 97530 THERAPEUTIC ACTIVITIES: CPT

## 2022-02-23 RX ORDER — TRAMADOL HYDROCHLORIDE 50 MG/1
50 TABLET ORAL
Status: DISCONTINUED | OUTPATIENT
Start: 2022-02-23 | End: 2022-02-25 | Stop reason: HOSPADM

## 2022-02-23 RX ADMIN — SODIUM CHLORIDE, PRESERVATIVE FREE 10 ML: 5 INJECTION INTRAVENOUS at 16:27

## 2022-02-23 RX ADMIN — ESCITALOPRAM OXALATE 10 MG: 10 TABLET ORAL at 09:34

## 2022-02-23 RX ADMIN — SODIUM CHLORIDE, PRESERVATIVE FREE 10 ML: 5 INJECTION INTRAVENOUS at 05:58

## 2022-02-23 RX ADMIN — HEPARIN SODIUM 5000 UNITS: 5000 INJECTION INTRAVENOUS; SUBCUTANEOUS at 16:22

## 2022-02-23 RX ADMIN — VANCOMYCIN HYDROCHLORIDE 1500 MG: 10 INJECTION, POWDER, LYOPHILIZED, FOR SOLUTION INTRAVENOUS at 12:37

## 2022-02-23 RX ADMIN — SODIUM CHLORIDE, PRESERVATIVE FREE 10 ML: 5 INJECTION INTRAVENOUS at 21:40

## 2022-02-23 RX ADMIN — HEPARIN SODIUM 5000 UNITS: 5000 INJECTION INTRAVENOUS; SUBCUTANEOUS at 09:33

## 2022-02-23 RX ADMIN — TRAMADOL HYDROCHLORIDE 50 MG: 50 TABLET, COATED ORAL at 16:23

## 2022-02-23 RX ADMIN — CETIRIZINE HYDROCHLORIDE 10 MG: 10 TABLET, FILM COATED ORAL at 09:34

## 2022-02-23 RX ADMIN — HEPARIN SODIUM 5000 UNITS: 5000 INJECTION INTRAVENOUS; SUBCUTANEOUS at 01:46

## 2022-02-23 NOTE — PROGRESS NOTES
Problem: Self Care Deficits Care Plan (Adult)  Goal: *Acute Goals and Plan of Care (Insert Text)  Description: Occupational Therapy Goals  Initiated 2/21/2022 within 7 day(s). 1.  Patient will perform functional activity standing for 6-9 minutes with modified independence, G balance. (Met 2/23)  2. Patient will perform upper body dressing with modified independence using compensatory techniques prn. (Met 2/23)  3. Patient will perform lower body dressing with modified independence using adaptive equipment prn. (Met 2/23)  4. Patient will perform toilet transfers with modified independence. (Met with supervision)  5. Patient will perform all aspects of toileting with modified independence. (Met 2/23- supervision for balance)  6. Patient will participate in upper extremity therapeutic exercise/activities with modified independence for 5-8 minutes. 7.  Patient will utilize energy conservation techniques during functional activities with verbal cues. Prior Level of Function: Patient was independent with self-care and functional mobility PTA. Outcome: Resolved/Met       OCCUPATIONAL THERAPY TREATMENT/DISCHARGE    Patient: Zaynab Long (31 y.o. female)  Date: 2/23/2022  Diagnosis: Sepsis ([facility] [address] [A41.9]  Breast abscess [N61.1]  Abscess of left breast [N61.1] <principal problem not specified>  Procedure(s) (LRB):  REMOVAL OF TISSUE EXPANDER LEFT BREAST, PLACEMENT OF DRAIN (Left) 3 Days Post-Op  Precautions: Fall (s/p removal tissue L breast - no push/pull/heavy lifting )    Chart, occupational therapy assessment, plan of care, and goals were reviewed. ASSESSMENT:  Patient cleared to participate in OT treatment by RN. Upon entering the room, the patient was seated in chair, alert, and agreeable to participate in OT session. Patient /69, no complaints this session of feeling dizzy.  Patient issued list of adaptive equipment last session pending performance and issued 1/4 this session d/t improvement. Patient independent with donning gown, modified independent with doffing/donning socks and modified independent with donning underwear this session no longer requiring sock-aid or reacher. Patient educated on compensatory techniques for dressing and with no further concerns. Patient able to tailor sit for tasks this session and educated on no longer needing shoe horn as well. Patient does not have shower chair at home, issued long handled sponge this session for safety. Patient inquiring about tub transfer and educated on safety techniques using grab bar. Patient performed functional transfers in preparation for ADLs with supervision - modified independence and functional mobility in preparation for ADLs with supervision. The patient presents with good static standing and fair + dynamic standing balance, however will defer to PT for functional balance and functional mobility tasks. Patient requesting to ambulate in virgen this session and tolerated well. Based on the objective data described below, the patient presents with no deficits that impede pt function with ADLs. At this time patient is safe to d/c home with family support from selfcare standpoint. OT to d/c from caseload. PLAN:  Patient will be discharged from occupational therapy at this time. Rationale for discharge:  [x] Goals Achieved  [] [address]  [] Patient not participating in therapy  [] Other:  Discharge Recommendations:  Home with family support and Home Health   Further Equipment Recommendations for Discharge:  rolling walker     SUBJECTIVE:   Patient stated can I walk the virgen again?     OBJECTIVE DATA SUMMARY:   Cognitive/Behavioral Status:  Neurologic State: Alert  Orientation Level: Oriented X4  Cognition: Follows commands  Safety/Judgement: Fall prevention,Awareness of environment    Functional Mobility and Transfers for ADLs:   Bed Mobility:  Supine to Sit: Modified independent  Scooting: Modified independent     Transfers:  Sit to Stand: Supervision  Stand to Sit: Modified independent   Toilet Transfer : Supervision     Balance:  Sitting: Intact  Standing: Impaired; With support  Standing - Static: Good  Standing - Dynamic : Fair ((+))    ADL Intervention:  Grooming  Grooming Assistance: Independent  Position Performed: Standing  Washing Hands: Independent    Lower Body Bathing  Bathing Assistance: Modified independent  Lower Body : Modified independent (simulation seated)  Position Performed: Seated in chair  Adaptive Equipment: Long handled sponge    Upper Body Dressing Assistance  Dressing Assistance: [id number] [facility] [id number]: Independent    Lower Body Dressing Assistance  Dressing Assistance: Modified independent  Underpants: Modified independent; Compensatory technique training (donning LLE first)  Socks: Modified independent  Leg Crossed Method Used: Yes  Position Performed: Seated in chair;Standing    Toileting  Toileting Assistance: Modified independent  Bladder Hygiene: Modified independent  Bowel Hygiene: Modified indpendent (simulation as previous session pt required assistance)    Cognitive Retraining  Safety/Judgement: Fall prevention; Awareness of environment    Pain:  Pain level pre-treatment: 0/10   Pain level post-treatment: 0/10   Pain Intervention(s): Medication (see MAR); Rest, Ice, Repositioning   Response to intervention: Nurse notified, See doc flow    Activity Tolerance:    Good    Please refer to the flowsheet for vital signs taken during this treatment. After treatment:   [x]  Patient left in no apparent distress sitting up in chair  []  Patient left in no apparent distress in bed  [x]  Call bell left within reach  [x]  Nursing notified  []  Caregiver present  []  Bed alarm activated    COMMUNICATION/EDUCATION:   [x]      Role of Occupational Therapy in the acute care setting  [x]      Home safety education was provided and the patient/caregiver indicated understanding.   [x] Patient/family have participated as able and agree with findings and recommendations. []      Patient is unable to participate in plan of care at this time. Thank you for allowing me to assist in the care of this patient.   Melody Lai OTR/L  Time Calculation: 39 mins

## 2022-02-23 NOTE — PROGRESS NOTES
Infectious Disease progress Note        Reason: left breast tissue expander infection, gram-positive bacteremia    Current abx Prior abx   vancomycin since 2/20/22  cefepime 2/20  Zosyn 2/20-2/22     Lines:       Assessment :    31year old woman with left triple negative berast cancer, s/p bilateral mastectomy 8/26/21,  lap barrie  2/7/22 presented to [facility] on 2/20/2022 with left breast pain    Now with gram-positive bacteremia    Clinical presentation consistent with severe sepsis-present on admission due to  bloodstream infection-(positive blood culture 2/20, negative blood culture 2/22), left breast tissue expander infection/left breast  Patient has been appropriately managed with removal of tissue expander left breast on 2/20/2022  Intra-Op cultures 2/20-methicillin resistant Staph aureus    Most likely source of  infection is left breast tissue expander infection    Right-sided abdominal pain-likely postoperative changes due to recent cholecystectomy    Antibiotic management complicated due to documented history of penicillin allergy-patient does not think she has had any allergic reaction to penicillin. States that she has family history of penicillin allergy. Does not know the nature of allergic reaction. Currently has tolerated cefepime, piperacillin/tazobactam without difficulties-willing to use penicillin derivatives if needed    Recommendations:    1. Continue vancomycin while inpatient  2. Follow-up  Repeat blood culture 2/22 to determine clearance of bloodstream infection  3. Will place PICC line for outpatient IV antibiotics if repeat blood culture 2/22  in a.m.  4.  Follow-up surgery recommendations  5.  patient will need outpatient IV antibiotics-recommend outpatient IV daptomycin till 3/7/2022 if repeat blood cultures 2/22 remain negative in a.m.   Recommend daptomycin since once daily dosing more convenient as outpatient    Home health orders on chart (click on \"chart review, other orders, IP home health)     Above plan was discussed in details with patient, primary team, . Please call me if any further questions or concerns. Will continue to participate in the care of this patient. HPI:    Feels better. Improved left breast discomfort    Past Medical History:   Diagnosis Date    Asthma     last attack years ago    Cancer [facility])     BREAST    Chronic pain of left thumb        Past Surgical History:   Procedure Laterality Date    HAND/FINGER SURGERY UNLISTED      left thumb    HX BREAST BIOPSY      HX BREAST RECONSTRUCTION Bilateral 2021    BILATERAL BREAST RECONSTRUCTION WITH TISSUE EXPANDERS & ADIPOFASCIAL FLAPS performed by Juhi Andre MD at [address] HX  SECTION  2016    HX CYST INCISION AND DRAINAGE Left 2022    REMOVAL OF TISSUE EXPANDER LEFT BREAST, PLACEMENT OF DRAIN performed by Poncho Garrison MD at [address] [facility]  [facility] HX MASTECTOMY Bilateral 2021    BILATERAL SKIN SPARING MASTECTOMY; LEFT SENTINEL NODE BIOPSY performed by Sergio Blum MD at [address] [facility] Medication List    Details   ergocalciferol (Vitamin D2) 1,250 mcg (50,000 unit) capsule Take 50,000 Units by mouth every seven (7) days. escitalopram oxalate (Lexapro) 10 mg tablet Take 10 mg by mouth daily. loratadine 10 mg cap Take 10 mg by mouth. ondansetron hcl (ZOFRAN) 8 mg tablet Take 8 mg by mouth every eight (8) hours as needed for Nausea or Vomiting. OXYBUTYNIN CHLORIDE PO Take 5 mg by mouth daily. albuterol (PROVENTIL HFA, VENTOLIN HFA, PROAIR HFA) 90 mcg/actuation inhaler Take 1 Puff by inhalation every four (4) hours as needed for Wheezing.   Qty: 1 Inhaler, Refills: 0             Current Facility-Administered Medications   Medication Dose Route Frequency    traMADoL (ULTRAM) tablet 50 mg  50 mg Oral Q8H PRN    heparin (porcine) injection 5,000 Units  5,000 Units SubCUTAneous Q8H    vancomycin (VANCOCIN) 1500 mg in  ml infusion  1,500 mg IntraVENous Q18H    ibuprofen (MOTRIN) tablet 600 mg  600 mg Oral Q6H PRN    sodium chloride (NS) flush 5-10 mL  5-10 mL IntraVENous PRN    albuterol (PROVENTIL HFA, VENTOLIN HFA, PROAIR HFA) inhaler 1 Puff  1 Puff Inhalation Q4H PRN    [Held by provider] ergocalciferol capsule 50,000 Units  50,000 Units Oral Q7D    escitalopram oxalate (LEXAPRO) tablet 10 mg  10 mg Oral DAILY    cetirizine (ZYRTEC) tablet 10 mg  10 mg Oral DAILY    [Held by provider] ondansetron hcl (ZOFRAN) tablet 8 mg  8 mg Oral Q8H PRN    [Held by provider] oxybutynin (DITROPAN) tablet 5 mg  5 mg Oral DAILY    sodium chloride (NS) flush 5-40 mL  5-40 mL IntraVENous Q8H    sodium chloride (NS) flush 5-40 mL  5-40 mL IntraVENous PRN    ondansetron (ZOFRAN) injection 4 mg  4 mg IntraVENous Q6H PRN    glucose chewable tablet 16 g  16 g Oral PRN    glucagon (GLUCAGEN) injection 1 mg  1 mg IntraMUSCular PRN    dextrose 10% infusion 0-250 mL  0-250 mL IntraVENous PRN       Allergies: Percocet [oxycodone-acetaminophen], Tylenol [acetaminophen], Dilaudid [hydromorphone], Penicillins, and Gadavist [gadobutrol]    Family History   Problem Relation Age of Onset    Diabetes Mother     No Known Problems Father      Social History     Socioeconomic History    Marital status: SINGLE     Spouse name: Not on file    Number of children: Not on file    Years of education: Not on file    Highest education level: Not on file   Occupational History    Not on file   Tobacco Use    Smoking status: Never Smoker    Smokeless tobacco: Never Used   Substance and Sexual Activity    Alcohol use: Yes     Comment: OCASSIONAL    Drug use: Never    Sexual activity: Yes     Birth control/protection: None   Other Topics Concern     Service Not Asked    Blood Transfusions Not Asked    Caffeine Concern Not Asked    Occupational Exposure Not Asked    Hobby Hazards Not Asked    Sleep Concern Not Asked    Stress Concern Not Asked    Weight Concern Not Asked    Special Diet Not Asked    Back Care Not Asked    Exercise Not Asked    Bike Helmet Not Asked    [address] Not Asked    Self-Exams Not Asked   Social History Narrative    Not on file     Social Determinants of Health     Financial Resource Strain:     Difficulty of Paying Living Expenses: Not on file   Food Insecurity:     Worried About Running Out of Food in the Last Year: Not on file    Indy of Food in the Last Year: Not on file   Transportation Needs:     Lack of Transportation (Medical): Not on file    Lack of Transportation (Non-Medical): Not on file   Physical Activity:     Days of Exercise per Week: Not on file    Minutes of Exercise per Session: Not on file   Stress:     Feeling of Stress : Not on file   Social Connections:     Frequency of Communication with Friends and Family: Not on file    Frequency of Social Gatherings with Friends and Family: Not on file    Attends Religion Services: Not on file    Active Member of [address] [place] [address] or Organizations: Not on file    Attends Club or Organization Meetings: Not on file    Marital Status: Not on file   Intimate Partner Violence:     Fear of Current or Ex-Partner: Not on file    Emotionally Abused: Not on file    Physically Abused: Not on file    Sexually Abused: Not on file   Housing Stability:     Unable to Pay for Housing in the Last Year: Not on file    Number of [place] in the Last Year: Not on file    Unstable Housing in the Last Year: Not on file     Social History     Tobacco Use   Smoking Status Never Smoker   Smokeless Tobacco Never Used        Temp (24hrs), Av.5 °F (36.4 °C), Min:97.3 °F (36.3 °C), Max:97.7 °F (36.5 °C)    Visit Vitals  /69   Pulse 77   Temp 97.3 °F (36.3 °C)   Resp 20   Ht 5' (1.524 m)   Wt 108 kg (238 lb 1.6 oz)   SpO2 100%   BMI 46.50 kg/m²       ROS: 12 point ROS obtained in details.  Pertinent positives as mentioned in HPI,   otherwise negative    Physical Exam:    General: Well developed, well nourished female /sitting on the  bed AAOx3 in no acute distress. General:   awake alert and oriented   HEENT:  Normocephalic, atraumatic,  EOMI, no scleral icterus or pallor; no conjunctival hemmohage;  nasal and oral mucous are moist and without evidence of lesions. Neck supple, no bruits. Lymph Nodes:   not examined   Lungs:   non-labored, bilaterally clear to auscultation- no crackles wheezes rales or rhonchi   Heart:  RRR, s1 and s2; no rubs or gallops, no edema   Abdomen:  soft, non-distended, active bowel sounds, no hepatomegaly, no splenomegaly. Right upper/lower quadrant tenderness-no guarding/rigidity   Genitourinary:  deferred   Extremities:   no clubbing, cyanosis; no joint effusions or swelling; Full ROM of all large joints to the upper and lower extremities; muscle mass appropriate for age   Neurologic:  No gross focal sensory abnormalities; 5/5 muscle strength to upper and lower extremities. Speech appropriate.  Cranial nerves intact                        Skin:  Surgical changes on chest.  Left chest dressing not removed   Back:  no spinal or paraspinal muscle tenderness or rigidity, no CVA tenderness     Psychiatric:  No suicidal or homicidal ideations, appropriate mood and affect         Labs: Results:   Chemistry Recent Labs     02/23/22  0512 02/22/22  0532 02/21/22  0710   * 126* 150*    141 139   K 3.7 3.6 4.1    109 107   CO2 27 26 25   BUN 4* 6* 7   CREA 0.76 0.74 0.95   CA 8.3* 8.0* 8.1*   AGAP 5 6 7   BUCR 5* 8* 7*   AP 73 80 87   TP 5.0* 5.0* 5.2*   ALB 1.7* 1.6* 1.7*   GLOB 3.3 3.4 3.5   AGRAT 0.5* 0.5* 0.5*      CBC w/Diff Recent Labs     02/23/22  0512 02/22/22  0532 02/21/22  0710   WBC 10.3 11.1 10.8   RBC 2.82* 2.89* 2.82*   HGB 7.9* 8.0* 8.0*   HCT 24.9* 25.4* 24.8*    306 296   GRANS 62 73 43   LYMPH 20* 15* 23   EOS 3 3 5      Microbiology Recent Labs     02/22/22  1138 02/20/22  2115 CULT NO GROWTH AFTER 18 HOURS MODERATE * METHICILLIN RESISTANT STAPHYLOCOCCUS AUREUS **  (NOTE) MRSA CALLED TO DON AHMADI AT 2.23.22. [facility]  NO ANAEROBES ISOLATED          RADIOLOGY:    All available imaging studies/reports in [facility] care for this admission were reviewed    High complexity decision making was performed during the evaluation of this patient at high risk for decompensation          Disclaimer: Sections of this note are dictated utilizing voice recognition software, which may have resulted in some phonetic based errors in grammar and contents. Even though attempts were made to correct all the mistakes, some may have been missed, and remained in the body of the document. If questions arise, please contact our department.     Dr. Parish Nix, Infectious Disease Specialist  [phone]  February 23, 2022  7:23 AM

## 2022-02-23 NOTE — PROGRESS NOTES
Problem: Falls - Risk of  Goal: *Absence of Falls  Description: Document Dennie Oak Fall Risk and appropriate interventions in the flowsheet. Outcome: Progressing Towards Goal  Note: Fall Risk Interventions:  Mobility Interventions: Patient to call before getting OOB         Medication Interventions: Patient to call before getting OOB,Teach patient to arise slowly    Elimination Interventions:  Toileting schedule/hourly rounds,Patient to call for help with toileting needs,Call light in reach    History of Falls Interventions: Bed/chair exit alarm         Problem: Patient Education: Go to Patient Education Activity  Goal: Patient/Family Education  Outcome: Progressing Towards Goal

## 2022-02-23 NOTE — PROGRESS NOTES
Problem: Mobility Impaired (Adult and Pediatric)  Goal: *Acute Goals and Plan of Care (Insert Text)  Description: Physical Therapy Goals  Initiated 2/21/2022 and to be accomplished within 7 day(s)  1. Patient will move from supine to sit and sit to supine , scoot up and down, and roll side to side in bed with modified independence. 2.  Patient will transfer from bed to chair and chair to bed with modified independence using the least restrictive device. 3.  Patient will perform sit to stand with modified independence. 4.  Patient will ambulate with modified independence for 400 feet with the least restrictive device. PLOF: Pt reporting she lives alone in 1 story house with 0 SHITAL. Independent PTA. Pt has friend support as needed per pt report. Outcome: Progressing Towards Goal    PHYSICAL THERAPY TREATMENT    Patient: Fadumo Palm (31 y.o. female)  Date: 2/23/2022  Diagnosis: Sepsis ([facility] [address] [A41.9]  Breast abscess [N61.1]  Abscess of left breast [N61.1] <principal problem not specified>  Procedure(s) (LRB):  REMOVAL OF TISSUE EXPANDER LEFT BREAST, PLACEMENT OF DRAIN (Left) 3 Days Post-Op  Precautions: Fall (s/p removal tissue L breast - no push/pull/heavy lifting )      ASSESSMENT:  Patient willing to work with PT. She is independent with bed mobility. She is able to allen sock on her own sitting EOB with good balance. She was initially dizzy with change in position, though improves within 1-2 minutes. Patient requests to ambulate without RW. She is unsteady with HHA and agreeable to use RW for safety. Patient stood at the sink for 5 minutes with good balance while brushing her teeth. She ambulates 250 ft in hallway with supervision. Patient transfers to chair, call bell in reach, and needs addressed.        Progression toward goals:   []      Improving appropriately and progressing toward goals  [x]      Improving slowly and progressing toward goals  []      Not making progress toward goals and plan of care will be adjusted     PLAN:  Patient continues to benefit from skilled intervention to address the above impairments. Continue treatment per established plan of care. Discharge Recommendations:  Home Health  Further Equipment Recommendations for Discharge:  rolling walker     SUBJECTIVE:   Patient stated I feel so much better today .     OBJECTIVE DATA SUMMARY:   Critical Behavior:  Neurologic State: Alert  Orientation Level: Oriented X4  Cognition: Follows commands  Safety/Judgement: Fall prevention,Awareness of environment  Functional Mobility Training:  Bed Mobility:     Supine to Sit: Modified independent     Scooting: Modified independent         Transfers:  Sit to Stand: Supervision  Stand to Sit: Supervision                Balance:  Sitting: Intact  Standing: Impaired; With support  Standing - Static: Good  Standing - Dynamic : Fair ((+))         Ambulation/Gait Training:  Distance (ft): 250 Feet (ft)  Assistive Device: Walker, rolling  Ambulation - Level of Assistance: Supervision  Gait Abnormalities: Decreased step clearance                   Pain:  Pain level pre-treatment: 0/10  Pain level post-treatment: 0/10   Pain Intervention(s): Medication (see MAR); Rest, Ice, Repositioning   Response to intervention: Nurse notified, See doc flow    Activity Tolerance:   Good  Please refer to the flowsheet for vital signs taken during this treatment. After treatment:   [x] Patient left in no apparent distress sitting up in chair  [] Patient left in no apparent distress in bed  [x] Call bell left within reach  [x] Nursing notified  [] Caregiver present  [] Bed alarm activated  [] SCDs applied      COMMUNICATION/EDUCATION:   []         Role of Physical Therapy in the acute care setting. [x]         Fall prevention education was provided and the patient/caregiver indicated understanding. [x]         Patient/family have participated as able in working toward goals and plan of care.   [x]         Patient/family agree to work toward stated goals and plan of care. []         Patient understands intent and goals of therapy, but is neutral about his/her participation.   []         Patient is unable to participate in stated goals/plan of care: ongoing with therapy staff.  []         Other:        Mirella Rivers, PT   Time Calculation: 34 mins

## 2022-02-23 NOTE — DIABETES MGMT
Diabetes/ Glycemic Control Plan of Care    Recommendations: please provide a prescription for Contour test strips at discharge    Assessment:   Consult received for pre-diabetes education. She states she does have a family history of DM. Provided a Contour meter and 20 test strips - educated about when to monitor BG and targets. Reviewed her A1c and range for pre-diabetes. Provided DM meal planning education and printed meal plans -   She typically consumes regular soda and fast food - reviewed alternatives. Encouraged exercise and weight loss - she is to follow up with her PCP    DX:   1. Sepsis, due to unspecified organism, unspecified whether acute organ dysfunction present ([facility] [address]     2. Breast abrasion, left, initial encounter     3. Lactic acidosis     4. Leukocytosis, unspecified type     5. Abscess of left breast     6. Sinus tachycardia     7. Malignant neoplasm of left breast in female, estrogen receptor positive, unspecified site of breast ([facility] [address]        Fasting/ Morning blood glucose:   Lab Results   Component Value Date/Time    Glucose 115 (H) 02/23/2022 05:12 AM    Glucose (POC) 136 (H) 02/22/2022 07:20 AM     IV Fluids containing dextrose: none  Steroids: none      Blood glucose values:       Current A1c:   Lab Results   Component Value Date/Time    Hemoglobin A1c 6.1 (H) 02/20/2022 09:40 AM      Nutrition/Diet:   Active Orders   Diet    ADULT DIET Regular; No Concentrated Sweets; No brussel sprouts, asparagus, mushroom, banana pudding. LIKES: crystal lite/ lemon juice for water, salads, meatlaof, cereal/ milk. Meal Intake:  Patient Vitals for the past 168 hrs:   % Diet Eaten   02/23/22 1313 76 - 100%   02/23/22 0923 76 - 100%   02/22/22 1720 76 - 100%   02/22/22 1232 76 - 100%   02/22/22 0838 1 - 25%   02/21/22 1428 26 - 50%     Home diabetes medications:   Key Antihyperglycemic Medications     Patient is on no antihyperglycemic meds.         Plan/Goals:   Blood glucose will be within target of 70 - 180 mg/dl within 72 hours  Reinforce dietary and medication compliance at home.        Shamika Rossi MPH RN 1 Count includes the [facility]  Pager [phone]  Office [phone]

## 2022-02-23 NOTE — PROGRESS NOTES
Noted home health IV abx order. Left a message for [address] 1355: Spoke with Ann Manning of [facility]. She stated she will send the order to [facility] for them to cost and she will call their office to see if they can accept pt and she will call CM back. 1630: Per Ann Manning, they can accept pt. She stated pt's IV abx is covered at 100%. She stated if pt is discharging tomorrow, CM to contact [facility] to come do teaching with pt. Pt Dr. Aly Singh. Sofi Anthony, pt will be getting PICC line tomorrow.         Errol Lopez, KAROLINEN RN  Care Management  Pager: [phone]

## 2022-02-23 NOTE — PROGRESS NOTES
Problem: Falls - Risk of  Goal: *Absence of Falls  Description: Document Britton Blades Fall Risk and appropriate interventions in the flowsheet. Outcome: Progressing Towards Goal  Note: Fall Risk Interventions:  Mobility Interventions: Patient to call before getting OOB         Medication Interventions: Patient to call before getting OOB,Teach patient to arise slowly    Elimination Interventions: Call light in reach,Toileting schedule/hourly rounds,Patient to call for help with toileting needs    History of Falls Interventions: Bed/chair exit alarm,Door open when patient unattended         Problem: Patient Education: Go to Patient Education Activity  Goal: Patient/Family Education  Outcome: Progressing Towards Goal     Problem: Risk for Spread of Infection  Goal: Prevent transmission of infectious organism to others  Description: Prevent the transmission of infectious organisms to other patients, staff members, and visitors.   Outcome: Progressing Towards Goal     Problem: Patient Education:  Go to Education Activity  Goal: Patient/Family Education  Outcome: Progressing Towards Goal

## 2022-02-23 NOTE — PROGRESS NOTES
Bedside and Verbal shift change report given to Tracie Lau RN (oncoming nurse) by Juan Saeed RN (offgoing nurse). Report included the following information SBAR, Kardex, ED Summary, Intake/Output, MAR, Recent Results, Med Rec Status and Cardiac Rhythm NSR.    1949: Pt awake, AOX4, c/o pain, scale 3/10 but tolerated,denies sob, dressing dry and intact, ALVARO drain in place, V/S and shift assessment completed, bed locked and low position, call bell within reach     2119: pt c/o of Incisional pain L breast, PRN Motrin 600 mg PO given for pain    2346: sleeping, no distress noted, no change from previous assessment    0339: Pt sleeping, IVF infusing well, no visual sign of distress    0417: V/S monitoring done    0618: sleeping, call bell within reach    Bedside and Verbal shift change report given to Geovanny Jaramillo [facility] (oncoming nurse) by Lee Pete RN (offgoing nurse). Report included the following information SBAR, Kardex, ED Summary, Procedure Summary, Intake/Output, MAR, Recent Results, Med Rec Status and Cardiac Rhythm NSR.

## 2022-02-23 NOTE — PROGRESS NOTES
Intern Progress Note  [facility]       Patient: Ria Cruz MRN: [id number]  CSN: [id number]    YOB: 1995  Age: 31 y.o. Sex: female    DOA: 2/20/2022 LOS:  LOS: 3 days                    Subjective:        Acute events: Pt states she is doing well this am and states that her symptoms of diplopia have resolved today. Pt states that she did not work with PT and OT yesterday due to some low BP but looking forward to working with them today if possible. Pt is still having some pain around the site of her procedure on palpation, but it is much improved. ROS Pt denies any fevers, CP, SOB, N/v, or pain this morning other than previously stated. Objective:     Patient Vitals for the past 12 hrs:   Temp Pulse Resp BP SpO2   02/23/22 0715 97.5 °F (36.4 °C) 61 15 124/80 100 %   02/23/22 0417 97.7 °F (36.5 °C) 78 25 124/70 99 %   02/22/22 2319 97.7 °F (36.5 °C) 78 16 113/64 100 %          Intake/Output Summary (Last 24 hours) at 2/23/2022 0837  Last data filed at 2/23/2022 0418  Gross per 24 hour   Intake 2294.58 ml   Output 678 ml   Net 1616.58 ml       Physical Exam:   General: well-appearing, NAD, AOx3  CV:  RRR, no M/G/R  RESP: Unlabored breathing. Lungs CTAB, no wheezes, rales or rhonchi appreciated. Equal expansion bilaterally. ABD:  Soft, nontender, nondistended. No hepatosplenomegaly. MS:  No joint deformity or instability. No atrophy. Ext:  No edema. 2+ radial and dp pulses bilaterally. Skin: Warm & dry. L chest wall bandage C/D/I, Healing rash on R sided chest from hives pt got after receiving dilaudid per pt.    Psych: normal mood and affect     Lab/Data Reviewed:  Recent Results (from the past 24 hour(s))   CULTURE, BLOOD    Collection Time: 02/22/22 11:38 AM    Specimen: Blood   Result Value Ref Range    Special Requests: NO SPECIAL REQUESTS      Culture result: NO GROWTH AFTER 18 HOURS     CBC WITH AUTOMATED DIFF    Collection Time: 02/23/22  5:12 AM Result Value Ref Range    WBC 10.3 4.6 - 13.2 K/uL    RBC 2.82 (L) 4.20 - 5.30 M/uL    HGB 7.9 (L) 12.0 - 16.0 g/dL    HCT 24.9 (L) 35.0 - 45.0 %    MCV 88.3 78.0 - 100.0 FL    MCH 28.0 24.0 - 34.0 PG    MCHC 31.7 31.0 - 37.0 g/dL    RDW 20.0 (H) 11.6 - 14.5 %    PLATELET 237 971 - 566 K/uL    MPV 9.9 9.2 - 11.8 FL    NRBC 0.2 (H) 0  WBC    ABSOLUTE NRBC 0.02 (H) 0.00 - 0.01 K/uL    NEUTROPHILS 62 40 - 73 %    BAND NEUTROPHILS 2 0 - 5 %    LYMPHOCYTES 20 (L) 21 - 52 %    MONOCYTES 13 (H) 3 - 10 %    EOSINOPHILS 3 0 - 5 %    BASOPHILS 0 0 - 2 %    IMMATURE GRANULOCYTES 0 %    ABS. NEUTROPHILS 6.6 1.8 - 8.0 K/UL    ABS. LYMPHOCYTES 2.1 0.9 - 3.6 K/UL    ABS. MONOCYTES 1.3 (H) 0.05 - 1.2 K/UL    ABS. EOSINOPHILS 0.3 0.0 - 0.4 K/UL    ABS. BASOPHILS 0.0 0.0 - 0.1 K/UL    ABS. IMM. GRANS. 0.0 K/UL    DF MANUAL      PLATELET COMMENTS ADEQUATE PLATELETS      RBC COMMENTS ANISOCYTOSIS  1+       METABOLIC PANEL, COMPREHENSIVE    Collection Time: 02/23/22  5:12 AM   Result Value Ref Range    Sodium 142 136 - 145 mmol/L    Potassium 3.7 3.5 - 5.5 mmol/L    Chloride 110 100 - 111 mmol/L    CO2 27 21 - 32 mmol/L    Anion gap 5 3.0 - 18 mmol/L    Glucose 115 (H) 74 - 99 mg/dL    BUN PENDING MG/DL    Creatinine 0.76 0.6 - 1.3 MG/DL    BUN/Creatinine ratio PENDING     GFR est AA >60 >60 ml/min/1.73m2    GFR est non-AA >60 >60 ml/min/1.73m2    Calcium 8.3 (L) 8.5 - 10.1 MG/DL    Bilirubin, total 0.5 0.2 - 1.0 MG/DL    ALT (SGPT) 17 13 - 56 U/L    AST (SGOT) 11 10 - 38 U/L    Alk.  phosphatase 73 45 - 117 U/L    Protein, total 5.0 (L) 6.4 - 8.2 g/dL    Albumin 1.7 (L) 3.4 - 5.0 g/dL    Globulin 3.3 2.0 - 4.0 g/dL    A-G Ratio 0.5 (L) 0.8 - 1.7     VANCOMYCIN, RANDOM    Collection Time: 02/23/22  5:12 AM   Result Value Ref Range    Vancomycin, random 17.4 5.0 - 40.0 UG/ML       Assessment & Plan:     31 y.o. female with PMH breast cancer status post bilateral mastectomies with implant placement, most recently cholecystitis requiring cholecystectomy on 2/8/2022 presenting w/ syncope and abdominal pain.         Severe sepsis secondary to the left breast abscess   Now s/p removal of tissue expander of L breast with drainage and irrigation on 2/20   In ED: sinus tachycardia 140s-150s, lactic acidosis(~5), hyponatremia(128 baseline 137) VESTA (Cr 1.4 baseline 0.8), hyperglycemia (258)  Unclear why patient has left breast abscess since her breast surgery had been several months ago. Will rule out any abdominal source of infection since she recently had a gallbladder surgery about 10 days ago. - ICU was consulted recommended continuing to resuscitate patient with IV fluids. No need to transfer to ICU currently per ICU  -Lactic acid from 5.25->4.36-> 2.4  -Status post 5 L of fluid     Plan     -Continue 150 cc/h of LR  -continue Zosyn, continue vancomycin  -Monitor for signs of fluid overload respiratory status. Patient is young no history of CHF. Ordered proBNP, ICU ordered echo. Last echo looks like it is within normal limits  -Lactic acid every every 4 hours  -Follow-up on blood cultures urine cultures and pro-Mehran  - CT abdomen showed:  Trace residual fluid in the gallbladder fossa, decreased from previous. No  finding for postoperative abscess in the abdomen or pelvis.   -zofran 4mg IV prn (held po zofran)    Elevated BG  -250's-270's  -A1c 6.1% this admission  -on no home meds for T2DM, as pt states she is not Diabetic   Plan:  -TIDAC blood sugar checks  -Humalog 4U once  -started low dose SSI    #syncope+ fall possibly from orthostatic/ sepsis  EKG no ST changes trop X1 negative     Plan:   -Consider a CT head, if patient has persistent emesis or complains of HA  -May need imaging of /left hip if patient has continued pain     Breast cancer s/p bilateral mastectomy w implant  -consider reaching out to heme onc   -hold chemo po med capecitabine 500mg twice a day suppose to be on it starting 2/23, but her heme/onc physicians would like us to hold her medication for now in setting of acute disease.         Asthma  - cont prn albuterol     Depression  -cont lexapro 10mg daily     Bladder spasms  - hold oxybutynin         Diet  Adult   DVT Prophylaxis   SQH    GI Prophylaxis  none   Code status  Full   Disposition  TBD     Point of Contact Reece Shows   Relationship:   [phone]      Sylvia Singh MD , PGY-1   [facility] Medicine   February 23, 2022, 8:22 AM

## 2022-02-23 NOTE — PROGRESS NOTES
[address] [facility] Pharmacokinetic Monitoring Service - Vancomycin    Indication: SSTI  Target Concentration: Goal AUC/MICHELLE 400-600 mg*hr/L  Day of Therapy: 4  Additional Antimicrobials: pip-tazo    Pertinent Laboratory Values: Wt Readings from Last 1 Encounters:   02/23/22 108 kg (238 lb 1.6 oz)     Temp Readings from Last 1 Encounters:   02/23/22 97.5 °F (36.4 °C)     No components found for: PROCAL  Estimated Creatinine Clearance: 123.7 mL/min (based on SCr of 0.76 mg/dL). Recent Labs     02/23/22  0512 02/22/22  0532   WBC 10.3 11.1       Pertinent Cultures:  Culture Date Source Results   02/20 blood NGTD   02/20 blood MRSA   02/20 urine NGF   02/20 wound GPC   02/22 blood NGTD   MRSA Nasal Swab: N/A. Non-respiratory infection. .    Assessment:  Date/Time Current Dose Concentration (mg/L) Timing of Concentration (h) AUC   02/21 750 mg q12h 13.2 8 hr post dose 364   02/22 1,500 mg q18h NA    02/23 1,500 mg q18h 17.4 11 hr post dose 440   Note: Serum concentrations collected for AUC dosing may appear elevated if collected in close proximity to the dose administered, this is not necessarily an indication of toxicity    Plan:  Continue current dose of 1,500 mg q18h  No repeat level ordered at this time  Pharmacy will continue to monitor patient and adjust therapy as indicated    Thank you for the consult,  ParticFANNIE Lei  2/23/2022

## 2022-02-23 NOTE — HOME CARE
New home health IV abx orders,clinicals and demographics  faxed to [place] [address] [facility]  for IV abx cost ,Received call back from [address] [facility] at [place] [address] ,states patient is covered at 100%, notified patient and  Otoniel Patel) that patient is covered at 100% for IV abx  and [facility] will follow ; [facility] will await for PICC placement prior to discharge. CHE SOTO.

## 2022-02-24 ENCOUNTER — HOSPITAL ENCOUNTER (INPATIENT)
Dept: INTERVENTIONAL RADIOLOGY/VASCULAR | Age: 27
Discharge: HOME | DRG: 711 | End: 2022-02-24
Attending: INTERNAL MEDICINE
Payer: MEDICAID

## 2022-02-24 ENCOUNTER — APPOINTMENT (OUTPATIENT)
Dept: CT IMAGING | Age: 27
DRG: 711 | End: 2022-02-24
Attending: STUDENT IN AN ORGANIZED HEALTH CARE EDUCATION/TRAINING PROGRAM
Payer: MEDICAID

## 2022-02-24 LAB
ALBUMIN SERPL-MCNC: 1.8 G/DL (ref 3.4–5)
ALBUMIN/GLOB SERPL: 0.4 {RATIO} (ref 0.8–1.7)
ALP SERPL-CCNC: 83 U/L (ref 45–117)
ALT SERPL-CCNC: 16 U/L (ref 13–56)
ANION GAP SERPL CALC-SCNC: 6 MMOL/L (ref 3–18)
ANION GAP SERPL CALC-SCNC: 6 MMOL/L (ref 3–18)
AST SERPL-CCNC: 10 U/L (ref 10–38)
BACTERIA SPEC CULT: ABNORMAL
BASOPHILS # BLD: 0.1 K/UL (ref 0–0.1)
BASOPHILS NFR BLD: 0 % (ref 0–2)
BILIRUB SERPL-MCNC: 0.6 MG/DL (ref 0.2–1)
BUN SERPL-MCNC: 2 MG/DL (ref 7–18)
BUN SERPL-MCNC: 3 MG/DL (ref 7–18)
BUN/CREAT SERPL: 3 (ref 12–20)
BUN/CREAT SERPL: 4 (ref 12–20)
CALCIUM SERPL-MCNC: 8.6 MG/DL (ref 8.5–10.1)
CALCIUM SERPL-MCNC: 8.8 MG/DL (ref 8.5–10.1)
CHLORIDE SERPL-SCNC: 106 MMOL/L (ref 100–111)
CHLORIDE SERPL-SCNC: 109 MMOL/L (ref 100–111)
CO2 SERPL-SCNC: 29 MMOL/L (ref 21–32)
CO2 SERPL-SCNC: 29 MMOL/L (ref 21–32)
CREAT SERPL-MCNC: 0.8 MG/DL (ref 0.6–1.3)
CREAT SERPL-MCNC: 0.8 MG/DL (ref 0.6–1.3)
DIFFERENTIAL METHOD BLD: ABNORMAL
EOSINOPHIL # BLD: 0.6 K/UL (ref 0–0.4)
EOSINOPHIL NFR BLD: 5 % (ref 0–5)
ERYTHROCYTE [DISTWIDTH] IN BLOOD BY AUTOMATED COUNT: 20 % (ref 11.6–14.5)
GLOBULIN SER CALC-MCNC: 4.3 G/DL (ref 2–4)
GLUCOSE SERPL-MCNC: 134 MG/DL (ref 74–99)
GLUCOSE SERPL-MCNC: 135 MG/DL (ref 74–99)
GRAM STN SPEC: ABNORMAL
HCT VFR BLD AUTO: 25.4 % (ref 35–45)
HGB BLD-MCNC: 8 G/DL (ref 12–16)
IMM GRANULOCYTES # BLD AUTO: 0.6 K/UL (ref 0–0.04)
IMM GRANULOCYTES NFR BLD AUTO: 5 % (ref 0–0.5)
LYMPHOCYTES # BLD: 2.5 K/UL (ref 0.9–3.6)
LYMPHOCYTES NFR BLD: 22 % (ref 21–52)
MAGNESIUM SERPL-MCNC: 2.1 MG/DL (ref 1.6–2.6)
MCH RBC QN AUTO: 27.8 PG (ref 24–34)
MCHC RBC AUTO-ENTMCNC: 31.5 G/DL (ref 31–37)
MCV RBC AUTO: 88.2 FL (ref 78–100)
MONOCYTES # BLD: 1 K/UL (ref 0.05–1.2)
MONOCYTES NFR BLD: 9 % (ref 3–10)
NEUTS SEG # BLD: 6.7 K/UL (ref 1.8–8)
NEUTS SEG NFR BLD: 59 % (ref 40–73)
NRBC # BLD: 0.05 K/UL (ref 0–0.01)
NRBC BLD-RTO: 0.4 PER 100 WBC
PLATELET # BLD AUTO: 393 K/UL (ref 135–420)
PMV BLD AUTO: 9.9 FL (ref 9.2–11.8)
POTASSIUM SERPL-SCNC: 3.2 MMOL/L (ref 3.5–5.5)
POTASSIUM SERPL-SCNC: 3.9 MMOL/L (ref 3.5–5.5)
PROT SERPL-MCNC: 6.1 G/DL (ref 6.4–8.2)
RBC # BLD AUTO: 2.88 M/UL (ref 4.2–5.3)
SERVICE CMNT-IMP: ABNORMAL
SODIUM SERPL-SCNC: 141 MMOL/L (ref 136–145)
SODIUM SERPL-SCNC: 144 MMOL/L (ref 136–145)
WBC # BLD AUTO: 11.3 K/UL (ref 4.6–13.2)

## 2022-02-24 PROCEDURE — 74011250637 HC RX REV CODE- 250/637: Performed by: STUDENT IN AN ORGANIZED HEALTH CARE EDUCATION/TRAINING PROGRAM

## 2022-02-24 PROCEDURE — 36415 COLL VENOUS BLD VENIPUNCTURE: CPT

## 2022-02-24 PROCEDURE — 97116 GAIT TRAINING THERAPY: CPT

## 2022-02-24 PROCEDURE — 05HB33Z INSERTION OF INFUSION DEVICE INTO RIGHT BASILIC VEIN, PERCUTANEOUS APPROACH: ICD-10-PCS | Performed by: RADIOLOGY

## 2022-02-24 PROCEDURE — 74011000636 HC RX REV CODE- 636: Performed by: FAMILY MEDICINE

## 2022-02-24 PROCEDURE — 65270000029 HC RM PRIVATE

## 2022-02-24 PROCEDURE — 74011250636 HC RX REV CODE- 250/636

## 2022-02-24 PROCEDURE — 83735 ASSAY OF MAGNESIUM: CPT

## 2022-02-24 PROCEDURE — 36569 INSJ PICC 5 YR+ W/O IMAGING: CPT

## 2022-02-24 PROCEDURE — 2709999900 HC NON-CHARGEABLE SUPPLY

## 2022-02-24 PROCEDURE — 70496 CT ANGIOGRAPHY HEAD: CPT

## 2022-02-24 PROCEDURE — 85025 COMPLETE CBC W/AUTO DIFF WBC: CPT

## 2022-02-24 PROCEDURE — 80053 COMPREHEN METABOLIC PANEL: CPT

## 2022-02-24 PROCEDURE — 74011250636 HC RX REV CODE- 250/636: Performed by: FAMILY MEDICINE

## 2022-02-24 PROCEDURE — 74011000250 HC RX REV CODE- 250: Performed by: STUDENT IN AN ORGANIZED HEALTH CARE EDUCATION/TRAINING PROGRAM

## 2022-02-24 RX ORDER — POTASSIUM CHLORIDE 20 MEQ/1
20 TABLET, EXTENDED RELEASE ORAL
Status: COMPLETED | OUTPATIENT
Start: 2022-02-24 | End: 2022-02-24

## 2022-02-24 RX ORDER — TRAMADOL HYDROCHLORIDE 50 MG/1
50 TABLET ORAL
Qty: 6 TABLET | Refills: 0 | Status: SHIPPED | OUTPATIENT
Start: 2022-02-24 | End: 2022-02-25

## 2022-02-24 RX ORDER — IBUPROFEN 600 MG/1
600 TABLET ORAL
Qty: 30 TABLET | Refills: 0 | Status: SHIPPED | OUTPATIENT
Start: 2022-02-24 | End: 2022-03-16

## 2022-02-24 RX ADMIN — POTASSIUM CHLORIDE 20 MEQ: 1500 TABLET, EXTENDED RELEASE ORAL at 09:01

## 2022-02-24 RX ADMIN — TRAMADOL HYDROCHLORIDE 50 MG: 50 TABLET, COATED ORAL at 18:52

## 2022-02-24 RX ADMIN — VANCOMYCIN HYDROCHLORIDE 1500 MG: 10 INJECTION, POWDER, LYOPHILIZED, FOR SOLUTION INTRAVENOUS at 22:07

## 2022-02-24 RX ADMIN — HEPARIN SODIUM 5000 UNITS: 5000 INJECTION INTRAVENOUS; SUBCUTANEOUS at 08:40

## 2022-02-24 RX ADMIN — SODIUM CHLORIDE, PRESERVATIVE FREE 10 ML: 5 INJECTION INTRAVENOUS at 17:37

## 2022-02-24 RX ADMIN — SODIUM CHLORIDE, PRESERVATIVE FREE 10 ML: 5 INJECTION INTRAVENOUS at 22:10

## 2022-02-24 RX ADMIN — IOPAMIDOL 100 ML: 755 INJECTION, SOLUTION INTRAVENOUS at 18:02

## 2022-02-24 RX ADMIN — SODIUM CHLORIDE, PRESERVATIVE FREE 10 ML: 5 INJECTION INTRAVENOUS at 05:02

## 2022-02-24 RX ADMIN — VANCOMYCIN HYDROCHLORIDE 1500 MG: 10 INJECTION, POWDER, LYOPHILIZED, FOR SOLUTION INTRAVENOUS at 04:12

## 2022-02-24 RX ADMIN — ESCITALOPRAM OXALATE 10 MG: 10 TABLET ORAL at 08:40

## 2022-02-24 RX ADMIN — HEPARIN SODIUM 5000 UNITS: 5000 INJECTION INTRAVENOUS; SUBCUTANEOUS at 17:34

## 2022-02-24 RX ADMIN — TRAMADOL HYDROCHLORIDE 50 MG: 50 TABLET, COATED ORAL at 10:46

## 2022-02-24 RX ADMIN — HEPARIN SODIUM 5000 UNITS: 5000 INJECTION INTRAVENOUS; SUBCUTANEOUS at 00:05

## 2022-02-24 RX ADMIN — CETIRIZINE HYDROCHLORIDE 10 MG: 10 TABLET, FILM COATED ORAL at 08:40

## 2022-02-24 RX ADMIN — POTASSIUM CHLORIDE 20 MEQ: 1500 TABLET, EXTENDED RELEASE ORAL at 10:46

## 2022-02-24 RX ADMIN — POTASSIUM CHLORIDE 20 MEQ: 1500 TABLET, EXTENDED RELEASE ORAL at 08:38

## 2022-02-24 RX ADMIN — POTASSIUM CHLORIDE 20 MEQ: 1500 TABLET, EXTENDED RELEASE ORAL at 12:43

## 2022-02-24 RX ADMIN — IBUPROFEN 600 MG: 400 TABLET, FILM COATED ORAL at 08:55

## 2022-02-24 NOTE — PROGRESS NOTES
0700-received bedside report from off going nurse [facility].    4320-CCMYS with Dr. Haley Veras regarding pt dressing change. Dr. Haley Veras gave a telephone to apply a dry dressing to the wound daily. Read back completed.

## 2022-02-24 NOTE — PROGRESS NOTES
Bedside shift change report given to [address] (oncoming nurse) by Pop Kaur (offgoing nurse).  Report included the following information SBAR, Kardex, Intake/Output, MAR, Recent Results and Cardiac Rhythm SR.

## 2022-02-24 NOTE — WOUND CARE
Physical Exam   Room 2315: pt not seen at this time, per chart, pt has well approximated incisions, discussed with primary nurse Jasmine AHMADI, she will notify Dr. Tony Ricardo re: types & frequency of dressing changes. Will turn over care to nursing staff at this time.   Gi Pedroza BSN, RN, [facility], [address]

## 2022-02-24 NOTE — PROGRESS NOTES
Problem: Mobility Impaired (Adult and Pediatric)  Goal: *Acute Goals and Plan of Care (Insert Text)  Description: Physical Therapy Goals  Initiated 2/21/2022 and to be accomplished within 7 day(s)  1. Patient will move from supine to sit and sit to supine , scoot up and down, and roll side to side in bed with modified independence. 2.  Patient will transfer from bed to chair and chair to bed with modified independence using the least restrictive device. 3.  Patient will perform sit to stand with modified independence. 4.  Patient will ambulate with modified independence for 400 feet with the least restrictive device. PLOF: Pt reporting she lives alone in 1 story house with 0 SHITAL. Independent PTA. Pt has friend support as needed per pt report. Outcome: Resolved/Met   PHYSICAL THERAPY TREATMENT AND DISCHARGE    Patient: Sujey Andrew (31 y.o. female)  Date: 2/24/2022  Diagnosis: Sepsis ([facility] [address] [A41.9]  Breast abscess [N61.1]  Abscess of left breast [N61.1]   Procedure(s) (LRB):  REMOVAL OF TISSUE EXPANDER LEFT BREAST, PLACEMENT OF DRAIN (Left) 4 Days Post-Op  Precautions: Fall  ASSESSMENT:  Anticipating discharge today. Hospital issued ww in room; set-up for appropriate height for patient. Mod I for bed mobility and transfers. Utilizes bedside commode with good safety awareness. Amb in room with no AD and steady gait. Prefers to use ww for amb in virgen. Completes 5 steps x4 reps. Returned to seated in recliner in room. Denies mobility concerns with discharge to home. Call bell in reach. PLAN:  Further skilled physical therapy is not indicated at this time. Rationale for discharge:  [x]     Goals Achieved  []     [address]  []     Patient not participating in therapy  []     Other:  Discharge Recommendations:  None(home)  Further Equipment Recommendations for Discharge:  None     SUBJECTIVE:   Patient stated My uncle will drive me.     OBJECTIVE DATA SUMMARY:   Critical Behavior:  Neurologic State: Alert  Orientation Level: Oriented X4  Cognition: Follows commands  Safety/Judgement: Fall prevention,Awareness of environment  Psychosocial  Patient Behaviors: Cooperative  Functional Mobility Training:  Bed Mobility:  Supine to Sit: Modified independent  Transfers:  Sit to Stand: Modified independent  Stand to Sit: Modified independent  Balance:   Sitting: Intact  Standing: Intact  Ambulation/Gait Training:  Distance (ft): 250 Feet (ft)   Ambulation - Level of Assistance: Modified independent  Stairs:   Level of Assistance: Modified independent  Rail Use: both  Number of Stairs: 5 x4 reps  Pain:  Pain level pre-treatment: 0/10   Pain level post-treatment: 0/10     Activity Tolerance:   Good    After treatment:   [x] Patient left in no apparent distress sitting up in chair  [] Patient left in no apparent distress in bed  [x] Call bell left within reach  [x] Nursing notified  [] Caregiver present  [] Bed alarm activated  [] SCDs applied      COMMUNICATION/EDUCATION:   [x]         Role of physical therapy in the acute care setting. [x]         Fall prevention education was provided and the patient/caregiver indicated understanding. [x]         Patient/family have participated as able and agree with findings and recommendations. []         Patient is unable to participate in plan of care at this time.        Ludmila Shane, PT   Time Calculation: 18 mins

## 2022-02-24 NOTE — PROGRESS NOTES
[address] [facility] Pharmacokinetic Monitoring Service - Vancomycin    Indication: SSTI  Target Concentration: Goal AUC/MICHELLE 400-600 mg*hr/L  Day of Therapy: 5  Additional Antimicrobials: pip-tazo    Pertinent Laboratory Values: Wt Readings from Last 1 Encounters:   02/24/22 105.2 kg (232 lb)     Temp Readings from Last 1 Encounters:   02/24/22 97.7 °F (36.5 °C)     No components found for: PROCAL  Estimated Creatinine Clearance: 115.7 mL/min (based on SCr of 0.8 mg/dL). Recent Labs     02/24/22  0409 02/23/22  0512   WBC 11.3 10.3       Pertinent Cultures:  Culture Date Source Results   02/20 blood NGTD   02/20 blood MRSA   02/20 urine NGF   02/20 wound MRSA   02/22 blood NGTD   MRSA Nasal Swab: N/A. Non-respiratory infection.     Assessment:  Date/Time Current Dose Concentration (mg/L) Timing of Concentration (h) AUC   02/21 750 mg q12h 13.2 8 hr post dose 364   02/22 1,500 mg q18h -    02/23 1,500 mg q18h 17.4 11 hr post dose 440   02/24 1,500 mg q18h -    Note: Serum concentrations collected for AUC dosing may appear elevated if collected in close proximity to the dose administered, this is not necessarily an indication of toxicity    Plan:  Continue current dose of 1,500 mg q18h  No repeat level ordered at this time  Pharmacy will continue to monitor patient and adjust therapy as indicated    Thank you for the consult,  FANNIE Fernando  2/24/2022

## 2022-02-24 NOTE — PROGRESS NOTES
Infectious Disease progress Note        Reason: left breast tissue expander infection, gram-positive bacteremia    Current abx Prior abx   vancomycin since 2/20/22  cefepime 2/20  Zosyn 2/20-2/22     Lines:       Assessment :    31year old woman with left triple negative berast cancer, s/p bilateral mastectomy 8/26/21,  lap barrie  2/7/22 presented to [facility] on 2/20/2022 with left breast pain    Now with gram-positive bacteremia    Clinical presentation consistent with severe sepsis-present on admission due to  bloodstream infection-(positive blood culture 2/20, negative blood culture 2/22), left breast tissue expander infection/left breast  Patient has been appropriately managed with removal of tissue expander left breast on 2/20/2022  Intra-Op cultures 2/20-methicillin resistant Staph aureus    Most likely source of  infection is left breast tissue expander infection    Right-sided abdominal pain-likely postoperative changes due to recent cholecystectomy    Antibiotic management complicated due to documented history of penicillin allergy-patient does not think she has had any allergic reaction to penicillin. States that she has family history of penicillin allergy. Does not know the nature of allergic reaction. Currently has tolerated cefepime, piperacillin/tazobactam without difficulties-willing to use penicillin derivatives if needed    Recommendations:    1. Continue vancomycin while inpatient  2. Follow-up  Repeat blood culture 2/22 to determine clearance of bloodstream infection  3. Will place PICC line for outpatient IV antibiotics if repeat blood culture 2/22  in a.m.  4.  Follow-up surgery recommendations  5.  patient will need outpatient IV antibiotics-recommend outpatient IV daptomycin till 3/7/2022. Recommend daptomycin since once daily dosing more convenient as outpatient    Home health orders on chart (click on \"chart review, other orders, IP home health). Discussed with . Antibiotics arranged     Above plan was discussed in details with patient, primary team, . Please call me if any further questions or concerns. Will continue to participate in the care of this patient. HPI:    Feels better. Improved left breast discomfort    Past Medical History:   Diagnosis Date    Asthma     last attack years ago    Cancer [facility])     BREAST    Chronic pain of left thumb        Past Surgical History:   Procedure Laterality Date    HAND/FINGER SURGERY UNLISTED      left thumb    HX BREAST BIOPSY      HX BREAST RECONSTRUCTION Bilateral 2021    BILATERAL BREAST RECONSTRUCTION WITH TISSUE EXPANDERS & ADIPOFASCIAL FLAPS performed by Soledad Wilkes MD at [address] HX  SECTION  2016    HX CYST INCISION AND DRAINAGE Left 2022    REMOVAL OF TISSUE EXPANDER LEFT BREAST, PLACEMENT OF DRAIN performed by Ru Bueno MD at [address] [facility]  [facility] HX MASTECTOMY Bilateral 2021    BILATERAL SKIN SPARING MASTECTOMY; LEFT SENTINEL NODE BIOPSY performed by Yaquelin Frye MD at [address] [facility] Medication List    Details   ergocalciferol (Vitamin D2) 1,250 mcg (50,000 unit) capsule Take 50,000 Units by mouth every seven (7) days. escitalopram oxalate (Lexapro) 10 mg tablet Take 10 mg by mouth daily. loratadine 10 mg cap Take 10 mg by mouth. ondansetron hcl (ZOFRAN) 8 mg tablet Take 8 mg by mouth every eight (8) hours as needed for Nausea or Vomiting. OXYBUTYNIN CHLORIDE PO Take 5 mg by mouth daily. albuterol (PROVENTIL HFA, VENTOLIN HFA, PROAIR HFA) 90 mcg/actuation inhaler Take 1 Puff by inhalation every four (4) hours as needed for Wheezing.   Qty: 1 Inhaler, Refills: 0             Current Facility-Administered Medications   Medication Dose Route Frequency    potassium chloride (K-DUR, KLOR-CON M20) SR tablet 20 mEq  20 mEq Oral Q2H    traMADoL (ULTRAM) tablet 50 mg  50 mg Oral Q8H PRN    heparin (porcine) injection 5,000 Units  5,000 Units SubCUTAneous Q8H    vancomycin (VANCOCIN) 1500 mg in  ml infusion  1,500 mg IntraVENous Q18H    ibuprofen (MOTRIN) tablet 600 mg  600 mg Oral Q6H PRN    sodium chloride (NS) flush 5-10 mL  5-10 mL IntraVENous PRN    albuterol (PROVENTIL HFA, VENTOLIN HFA, PROAIR HFA) inhaler 1 Puff  1 Puff Inhalation Q4H PRN    [Held by provider] ergocalciferol capsule 50,000 Units  50,000 Units Oral Q7D    escitalopram oxalate (LEXAPRO) tablet 10 mg  10 mg Oral DAILY    cetirizine (ZYRTEC) tablet 10 mg  10 mg Oral DAILY    [Held by provider] ondansetron hcl (ZOFRAN) tablet 8 mg  8 mg Oral Q8H PRN    [Held by provider] oxybutynin (DITROPAN) tablet 5 mg  5 mg Oral DAILY    sodium chloride (NS) flush 5-40 mL  5-40 mL IntraVENous Q8H    sodium chloride (NS) flush 5-40 mL  5-40 mL IntraVENous PRN    ondansetron (ZOFRAN) injection 4 mg  4 mg IntraVENous Q6H PRN    glucose chewable tablet 16 g  16 g Oral PRN    glucagon (GLUCAGEN) injection 1 mg  1 mg IntraMUSCular PRN    dextrose 10% infusion 0-250 mL  0-250 mL IntraVENous PRN       Allergies: Percocet [oxycodone-acetaminophen], Tylenol [acetaminophen], Dilaudid [hydromorphone], Penicillins, and Gadavist [gadobutrol]    Family History   Problem Relation Age of Onset    Diabetes Mother     No Known Problems Father      Social History     Socioeconomic History    Marital status: SINGLE     Spouse name: Not on file    Number of children: Not on file    Years of education: Not on file    Highest education level: Not on file   Occupational History    Not on file   Tobacco Use    Smoking status: Never Smoker    Smokeless tobacco: Never Used   Substance and Sexual Activity    Alcohol use: Yes     Comment: OCASSIONAL    Drug use: Never    Sexual activity: Yes     Birth control/protection: None   Other Topics Concern     Service Not Asked    Blood Transfusions Not Asked    Caffeine Concern Not Asked    Occupational Exposure Not Asked   Infrastruct Security [place] Hazards Not Asked    Sleep Concern Not Asked    Stress Concern Not Asked    Weight Concern Not Asked    Special Diet Not Asked    Back Care Not Asked    Exercise Not Asked    Bike Helmet Not Asked    [address] Not Asked    Self-Exams Not Asked   Social History Narrative    Not on file     Social Determinants of Health     Financial Resource Strain:     Difficulty of Paying Living Expenses: Not on file   Food Insecurity:     Worried About Running Out of Food in the Last Year: Not on file    [place] of Food in the Last Year: Not on file   Transportation Needs:     Lack of Transportation (Medical): Not on file    Lack of Transportation (Non-Medical):  Not on file   Physical Activity:     Days of Exercise per Week: Not on file    Minutes of Exercise per Session: Not on file   Stress:     Feeling of Stress : Not on file   Social Connections:     Frequency of Communication with Friends and Family: Not on file    Frequency of Social Gatherings with Friends and Family: Not on file    Attends Restorationist Services: Not on file    Active Member of [address] [place] [address] or Organizations: Not on file    Attends Club or Organization Meetings: Not on file    Marital Status: Not on file   Intimate Partner Violence:     Fear of Current or Ex-Partner: Not on file    Emotionally Abused: Not on file    Physically Abused: Not on file    Sexually Abused: Not on file   Housing Stability:     Unable to Pay for Housing in the Last Year: Not on file    Number of [place] in the Last Year: Not on file    Unstable Housing in the Last Year: Not on file     Social History     Tobacco Use   Smoking Status Never Smoker   Smokeless Tobacco Never Used        Temp (24hrs), Av.8 °F (36.6 °C), Min:97.3 °F (36.3 °C), Max:98.2 °F (36.8 °C)    Visit Vitals  /77 (BP 1 Location: Right leg)   Pulse 72   Temp 97.9 °F (36.6 °C)   Resp 28   Ht 5' (1.524 m)   Wt 105.2 kg (232 lb)   SpO2 100% BMI 45.31 kg/m²       ROS: 12 point ROS obtained in details. Pertinent positives as mentioned in HPI,   otherwise negative    Physical Exam:    General: Well developed, well nourished female /sitting on the  bed AAOx3 in no acute distress. General:   awake alert and oriented   HEENT:  Normocephalic, atraumatic,  EOMI, no scleral icterus or pallor; no conjunctival hemmohage;  nasal and oral mucous are moist and without evidence of lesions. Neck supple, no bruits. Lymph Nodes:   not examined   Lungs:   non-labored, bilaterally clear to auscultation- no crackles wheezes rales or rhonchi   Heart:  RRR, s1 and s2; no rubs or gallops, no edema   Abdomen:  soft, non-distended, active bowel sounds, no hepatomegaly, no splenomegaly. Right upper/lower quadrant tenderness-no guarding/rigidity   Genitourinary:  deferred   Extremities:   no clubbing, cyanosis; no joint effusions or swelling; Full ROM of all large joints to the upper and lower extremities; muscle mass appropriate for age   Neurologic:  No gross focal sensory abnormalities; 5/5 muscle strength to upper and lower extremities. Speech appropriate.  Cranial nerves intact                        Skin:  Surgical changes on chest.  Left chest dressing not removed   Back:  no spinal or paraspinal muscle tenderness or rigidity, no CVA tenderness     Psychiatric:  No suicidal or homicidal ideations, appropriate mood and affect         Labs: Results:   Chemistry Recent Labs     02/24/22  0409 02/23/22  0512 02/22/22  0532   * 115* 126*    142 141   K 3.2* 3.7 3.6    110 109   CO2 29 27 26   BUN 2* 4* 6*   CREA 0.80 0.76 0.74   CA 8.6 8.3* 8.0*   AGAP 6 5 6   BUCR 3* 5* 8*   AP 83 73 80   TP 6.1* 5.0* 5.0*   ALB 1.8* 1.7* 1.6*   GLOB 4.3* 3.3 3.4   AGRAT 0.4* 0.5* 0.5*      CBC w/Diff Recent Labs     02/24/22  0409 02/23/22  0512 02/22/22  0532   WBC 11.3 10.3 11.1   RBC 2.88* 2.82* 2.89*   HGB 8.0* 7.9* 8.0*   HCT 25.4* 24.9* 25.4*    609 195 GRANS 59 62 73   LYMPH 22 20* 15*   EOS 5 3 3      Microbiology Recent Labs     02/22/22  1138   CULT NO GROWTH 2 DAYS          RADIOLOGY:    All available imaging studies/reports in [facility] care for this admission were reviewed        Disclaimer: Sections of this note are dictated utilizing voice recognition software, which may have resulted in some phonetic based errors in grammar and contents. Even though attempts were made to correct all the mistakes, some may have been missed, and remained in the body of the document. If questions arise, please contact our department.     Dr. Kanu Marie, Infectious Disease Specialist  [phone]  February 24, 2022  7:23 AM

## 2022-02-24 NOTE — DISCHARGE SUMMARY
Discharge Summary  [facility]      Patient: Dane Keenan MRN: [id number]  CSN: [id number]    YOB: 1995  Age: 31 y.o. Sex: female      Admission Date: 2/20/2022 Discharge Date: 2/24/2022   Attending: Emory Jaime MD PCP: Tiny Chinchilla MD     ===================================================================    Reason for Admission:   Sepsis [facility]) [A41.9]  Breast abscess [N61.1]  Abscess of left breast [N61.1]    Discharge Diagnoses:   Sepsis  L breast abcess  Syncope   Breast cancer   Asthma  Bladder Spasms  Depression    Important notes to PCP/ follow-up studies and evaluations   F/u L breast wound care and inspect site   Inspect Picc line site   Pt scheduled for surgery f/u on 2/25 at 3:45pm  Pt has scheduled f/u with Plastics per pt   Ensure neuro f/u for outpt MRI     Operative Procedures:   REMOVAL OF TISSUE EXPANDER LEFT BREAST    Discharge Medications:     Current Discharge Medication List      START taking these medications    Details   traMADoL (ULTRAM) 50 mg tablet Take 1 Tablet by mouth two (2) times daily as needed for Pain for up to 3 days. Max Daily Amount: 100 mg. Qty: 6 Tablet, Refills: 0  Start date: 2/24/2022, End date: 2/27/2022    Associated Diagnoses: Breast abscess      ibuprofen (MOTRIN) 600 mg tablet Take 1 Tablet by mouth every six (6) hours as needed for Pain. Qty: 30 Tablet, Refills: 0  Start date: 2/24/2022         CONTINUE these medications which have NOT CHANGED    Details   ergocalciferol (Vitamin D2) 1,250 mcg (50,000 unit) capsule Take 50,000 Units by mouth every seven (7) days. escitalopram oxalate (Lexapro) 10 mg tablet Take 10 mg by mouth daily. loratadine 10 mg cap Take 10 mg by mouth. ondansetron hcl (ZOFRAN) 8 mg tablet Take 8 mg by mouth every eight (8) hours as needed for Nausea or Vomiting. OXYBUTYNIN CHLORIDE PO Take 5 mg by mouth daily.       albuterol (PROVENTIL HFA, VENTOLIN HFA, PROAIR HFA) 90 mcg/actuation inhaler Take 1 Puff by inhalation every four (4) hours as needed for Wheezing. Qty: 1 Inhaler, Refills: 0             Disposition: Home and Home with Home Health    Consultants:    Infectious disease   Surgery  Nutrition    Brief Hospital Course (including pertinent history and physical findings)  Brennan Dickinson is a 31 y.o. female with PMH breast cancer status post bilateral mastectomies with implant placement, most recently cholecystitis requiring cholecystectomy on 2/8/2022 presenting w/ syncope and abdominal pain. Pt  completed neoadjuvant chemotherapy. Pt states that she is seen by Dr. Sabrina Godoy. Patient states that she was taking some chemotherapy capecitabine 500mg twice a day for 2 weeks. On admission pt left breast was swollen and painful with fluid collection on imaging. She was felt to be septic from infected expander, possibly infected hematoma from recent trauma. Copius pus was evacuated and wound was irrigated. A 15 round Ced drain was placed. Pt was stable after procedure. Pt was started are on Vanc and Zosyn after initially being on cefepime in the ED. Abx were narrowed to just IV Vanc once bcx came back (+ for gram positive bactermia). ID recc'd outpatient IV daptomycin till 3/7/2022 if repeat blood cultures 2/22 remain negative in a.m. ID Recommend daptomycin since once daily dosing more convenient as outpatient. PICC line was place prior to d/c. Discharge was on hold pending repeat cranial imaging on 2/24, so pt got CT head venography which had no acutely concerning findings. CURRENT ADMISSION IMAGING RESULTS   IR PICC INSERT WO PORT OVER 5 YEARS WO IMG    Result Date: 2/24/2022  Successful placement dual lumen PICC catheter. PICC line catheter can be used immediately. CTA HEAD    Result Date: 2/24/2022  1.7 cm nonenhancing lesion within the proximal left transverse sinus. 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  This markedly narrows but does not the left transverse sinus. Configuration is not consistent with venous sinus thrombosis. Cardiology Procedures/Testing:  MODALITY RESULTS   EKG Results for orders placed or performed during the hospital encounter of 02/20/22   EKG, 12 LEAD, INITIAL   Result Value Ref Range    Ventricular Rate 139 BPM    Atrial Rate 139 BPM    P-R Interval 132 ms    QRS Duration 62 ms    Q-T Interval 292 ms    QTC Calculation (Bezet) 444 ms    Calculated P Axis 68 degrees    Calculated R Axis 83 degrees    Calculated T Axis 32 degrees    Diagnosis       Sinus tachycardia  Abnormal ECG  When compared with ECG of 05-JUL-2021 12:46,  Vent. rate has increased BY  66 BPM  T wave inversion less evident in Anterior leads  Confirmed by Tiny Salter MD, --- ([id number]) on 2/20/2022 12:06:18 PM         ECHO 02/20/22    ECHO ADULT FOLLOW-UP OR LIMITED 02/21/2022 2/21/2022    Interpretation Summary    Left Ventricle: Left ventricle size is normal. Normal wall thickness. Normal wall motion. Normal left ventricular systolic function with a visually estimated EF of 55 - 60%.   Right Ventricle: Not well visualized. Signed by: Chico Hernandez MD on 2/21/2022 12:50 PM     IR Results from Hospital Encounter encounter on 02/20/22    IR PICC INSERT WO PORT OVER 5 YEARS WO IMG    Impression  Successful placement dual lumen PICC catheter. PICC line catheter can be used  immediately.      CATH      Special Testing/Procedures:  MODALITY RESULTS   MICRO All Micro Results     Procedure Component Value Units Date/Time    CULTURE, BLOOD [[id number]] Collected: 02/22/22 1138    Order Status: Completed Specimen: Blood Updated: 02/25/22 0637     Special Requests: NO SPECIAL REQUESTS        Culture result: NO GROWTH 3 DAYS       CULTURE, BLOOD [[id number]] Collected: 02/20/22 0940    Order Status: Completed Specimen: Blood Updated: 02/25/22 0637     Special Requests: NO SPECIAL REQUESTS        Culture result: NO GROWTH 5 DAYS       CULTURE, BLOOD [[id number]] (Abnormal)  (Susceptibility) Collected: 02/20/22 1048    Order Status: Completed Specimen: Blood Updated: 02/24/22 0744     Special Requests: NO SPECIAL REQUESTS        GRAM STAIN       ANAEROBIC BOTTLE GRAM POSITIVE COCCI IN GROUPS                  SMEAR CALLED TO AND CORRECTLY REPEATED BY: DAIANA [facility] CVT SD AT 1700 BY KDA 2/21/22                  AEROBIC BOTTLE GRAM POSITIVE COCCI IN PAIRS IN GROUPS                  SMEAR CALLED TO AND CORRECTLY REPEATED BY: DAIANA HALE CVTSD ON [id number] AT 9756 HRS TO 6907. Culture result:       * METHICILLIN RESISTANT STAPHYLOCOCCUS AUREUS * GROWING IN 1 OF 2 BOTTLES DRAWN (NO SITE INDICATED)                  STAPHYLOCOCCUS SPECIES, COAGULASE NEGATIVE GROWING IN THE SECOND BOTTLE            (NOTE) MRSA IN BLOOD READ TO [facility] AT [address] [place] [address] ON 2/23    CULTURE, Kristine Cheema STAIN [[id number]]  (Abnormal)  (Susceptibility) Collected: 02/20/22 2119    Order Status: Completed Specimen: Breast Updated: 02/23/22 1307     Special Requests: --        LEFT  BREAST       GRAM STAIN NO WBC'S SEEN               FEW GRAM POSITIVE COCCI IN PAIRS           Culture result:       MODERATE * METHICILLIN RESISTANT STAPHYLOCOCCUS AUREUS *            (NOTE) MRSA CALLED TO DON AHMADI AT 2.23.22.  [facility]    CULTURE, ANAEROBIC [[id number]] Collected: 02/20/22 2119    Order Status: Completed Specimen: Breast Updated: 02/23/22 1136     Special Requests: --        LEFT  BREAST       Culture result: NO ANAEROBES ISOLATED       CULTURE, URINE [[id number]] Collected: 02/20/22 1050    Order Status: Completed Specimen: Urine from Clean catch Updated: 02/22/22 1022     Special Requests: NO SPECIAL REQUESTS        Culture result: No growth (<1,000 CFU/ML)       COVID-19 RAPID TEST [[id number]] Collected: 02/20/22 1632    Order Status: Completed Specimen: Nasopharyngeal Updated: 02/20/22 1711     Specimen source Nasopharyngeal        COVID-19 rapid test Not detected        Comment: Rapid Abbott ID Now       Rapid NAAT:  The specimen is NEGATIVE for SARS-CoV-2, the novel coronavirus associated with COVID-19. Negative results should be treated as presumptive and, if inconsistent with clinical signs and symptoms or necessary for patient management, should be tested with an alternative molecular assay. Negative results do not preclude SARS-CoV-2 infection and should not be used as the sole basis for patient management decisions. This test has been authorized by the FDA under an Emergency Use Authorization (EUA) for use by authorized laboratories. Fact sheet for Healthcare Providers: ConventionDIVINE BOOKSdate.co.nz  Fact sheet for Patients: Palamidadate.co.nz       Methodology: Isothermal Nucleic Acid Amplification              ABG No results found for: PH, PHI, PCO2, PCO2I, PO2, PO2I, HCO3, HCO3I, FIO2, FIO2I   UA No results found for this or any previous visit.      Laboratory Results:  LABORATORY RESULTS   HEMATOLOGY Lab Results   Component Value Date/Time    WBC 8.6 02/25/2022 05:25 AM    HGB 8.4 (L) 02/25/2022 05:25 AM    HCT 28.2 (L) 02/25/2022 05:25 AM    PLATELET 336 44/64/8212 05:25 AM    MCV 90.1 02/25/2022 05:25 AM       CHEMISTRIES Lab Results   Component Value Date/Time    Sodium 141 02/25/2022 05:25 AM    Potassium 4.0 02/25/2022 05:25 AM    Chloride 106 02/25/2022 05:25 AM    CO2 31 02/25/2022 05:25 AM    Anion gap 4 02/25/2022 05:25 AM    Glucose 115 (H) 02/25/2022 05:25 AM    BUN 2 (L) 02/25/2022 05:25 AM    Creatinine 0.75 02/25/2022 05:25 AM    BUN/Creatinine ratio 3 (L) 02/25/2022 05:25 AM    GFR est AA >60 02/25/2022 05:25 AM    GFR est non-AA >60 02/25/2022 05:25 AM    Calcium 8.8 02/25/2022 05:25 AM      HEPATIC FUNCTION Lab Results   Component Value Date/Time    Albumin 1.9 (L) 02/25/2022 05:25 AM    Bilirubin, direct 2.2 (H) 02/09/2022 09:15 AM    Bilirubin, total 0.3 02/25/2022 05:25 AM    Protein, total 5.8 (L) 02/25/2022 05:25 AM    Globulin 3.9 02/25/2022 05:25 AM    A-G Ratio 0.5 (L) 02/25/2022 05:25 AM    ALT (SGPT) 15 02/25/2022 05:25 AM    Alk. phosphatase 85 02/25/2022 05:25 AM       LACTIC ACID Lab Results   Component Value Date/Time    Lactic acid 2.4 (HH) 02/21/2022 07:10 AM    Lactic acid 3.0 (HH) 02/21/2022 12:25 AM    Lactic acid 3.9 () 02/06/2022 11:50 AM      CARDIAC PANEL No results found for: CPK, RCK1, RCK2, RCK3, RCK4, CKMB, CKNDX, CKND1, TROPT, TROIQ, BNPP, BNP   NT-proBNP Lab Results   Component Value Date/Time    NT pro- 02/21/2022 07:10 AM    NT pro- 02/20/2022 09:40 AM      THYROID No results found for: TSH, TSHEXT, TSH2, TSH3, TSHP, TSHELE, TT3, T3U, T3UP, FRT3, FT3, T3T, FT4, FT4P, T4, T4P, FT4T, TT7, TSHEXT, TSHEXT     Functional status and cognitive function:    Status: alert, cooperative, no distress, appears stated age  Condition: STABLE    Physical exam on day of discharge:    General: well-appearing, NAD  HEENT: Conjunctiva pink, sclera anicteric. PERRL. EOMI   CV:  RRR, no M/G/R  RESP: Unlabored breathing. Lungs CTAB, no wheezes, rales or rhonchi appreciated. Equal expansion bilaterally. ABD:  Soft, nontender, nondistended. No hepatosplenomegaly. MS:  No joint deformity or instability. No atrophy. Neuro:  5/5 strength bilateral upper extremities and lower extremities. A+Ox3. Ext:  No edema. 2+ radial and dp pulses bilaterally. Skin: Warm & dry.  L chest wall bandage C/D/I, Healing rash on R sided chest from hives pt got after receiving dilaudid per pt. Psych: normal mood and affect     Code status and advanced care plan: Full    Point of Contact Soo Jarvis   Relationship:   [phone]       RISK CALCULATORS:  SCORE RESULT   READMISSION RISK SCORE Low Risk            8 Total Score    4 IP Visits Last 12 Months (1-3=4, 4=9, >4=11)    4 Pt. Coverage (Medicare=5 , Medicaid, or Self-Pay=4)        Criteria that do not apply:    Has Seen PCP in Last 6 Months (Yes=3, No=0)    .  Living with Significant Other. Assisted Living. LTAC. SNF. or   Rehab    Patient Length of Stay (>5 days = 3)    Charlson Comorbidity Score (Age + Comorbid Conditions)         Follow-up:   Follow-up Information     Follow up With Specialties Details Why Maurisio Aparicio MD Family Medicine Go on 2/28/2022 @2:00PM Appointment with Dr. Laal Clarke.   [place] Nathaniel Bloch, MD Family Medicine Go on 3/1/2022 2:00pm [address] [place], [place] [phone]            =================================================================    Annette Hahn MD, PGY-1   [facility] Medicine   February 25, 2022, 10:31 AM

## 2022-02-24 NOTE — PROGRESS NOTES
Discharge order noted for today. Pt has been accepted to [facility] agency. Met with patient and she is agreeable to the transition plan today. Transport has been arranged through patient's family. Patient's home health  orders have been forwarded to  [facility] home health  agency via [place]. Updated bedside RN, Stephanie Bains,  to the transition plan.   Discharge information has been documented on the AVS.             Susana Trent RN  Case Management [phone]

## 2022-02-24 NOTE — PROGRESS NOTES
Chart update, please see progress note attestation for details. Discharge on hold pending repeat cranial imaging. Residents spoke w/ Radiology regarding specifics of order \"CTA\"- with clarification that this is for venography per Radiology recommendations.     Kapil Etienne MD

## 2022-02-24 NOTE — PROGRESS NOTES
Dr. Terese Stevens said patient will be discharging today, and will need Wound care. Tivis Artist Dr. Padilla Stanley, and let her know that [facility] has accepted patient, for IV Antibiotics, and will need specific wound care orders. HEATH spoke with Michael Martino with [facility] and updated. CM spoke with patient, updated her on [address] [facility] [address] [address] for IV antibiotics. Patient said she will call a family member to transport her home today at time of discharge. HEATH delivered [employer] to patient, patient signed copies of [employer] form, and copies given to [employer] to put in Discharge Envelope. Jefry Pope with [facility] in patient's room, discussing IV antibiotics for home.                Jose Peralta, RN  Case Management [phone]

## 2022-02-24 NOTE — PROGRESS NOTES
Intern Progress Note  [facility]       Patient: Katerina Mendieta MRN: [id number]  CSN: [id number]    YOB: 1995  Age: 31 y.o. Sex: female    DOA: 2/20/2022 LOS:  LOS: 4 days                    Subjective:        Acute events: Pt states she is doing very well this am, better than she has felt in a while, and states she has much less pain around the site of her procedure and is actually able to sleep on that side overnight for the first time since procedure. Pt states she was able to walk all around the unit yesterday w/ PT as well as OT an did not have any dizziness, HA, or Lightheadedness. Plan is for getting PICC today. ROS Pt denies any fevers, CP, SOB, N/v, or pain this morning other than previously stated. Objective:     Patient Vitals for the past 12 hrs:   Temp Pulse Resp BP SpO2   02/24/22 0755 97.7 °F (36.5 °C) 73 25 120/69 99 %   02/24/22 0401 97.9 °F (36.6 °C) 72 28 133/77 100 %   02/23/22 2337 -- -- 26 -- --   02/23/22 2326 98.2 °F (36.8 °C) 73 (!) 32 132/82 100 %          Intake/Output Summary (Last 24 hours) at 2/24/2022 0911  Last data filed at 2/24/2022 8130  Gross per 24 hour   Intake 4560 ml   Output 6445 ml   Net -1885 ml       Physical Exam:   General: well-appearing, NAD, AOx3  CV:  RRR, no M/G/R  RESP: Unlabored breathing. Lungs CTAB, no wheezes, rales or rhonchi appreciated. Equal expansion bilaterally. ABD:  Soft, nontender, nondistended. No hepatosplenomegaly. MS:  No joint deformity or instability. No atrophy. Ext:  No edema. 2+ radial and dp pulses bilaterally. Skin: Warm & dry. L chest wall bandage C/D/I, Healing rash on R sided chest from hives pt got after receiving dilaudid per pt.    Psych: normal mood and affect     Lab/Data Reviewed:  Recent Results (from the past 24 hour(s))   CBC WITH AUTOMATED DIFF    Collection Time: 02/24/22  4:09 AM   Result Value Ref Range    WBC 11.3 4.6 - 13.2 K/uL    RBC 2.88 (L) 4.20 - 5.30 M/uL HGB 8.0 (L) 12.0 - 16.0 g/dL    HCT 25.4 (L) 35.0 - 45.0 %    MCV 88.2 78.0 - 100.0 FL    MCH 27.8 24.0 - 34.0 PG    MCHC 31.5 31.0 - 37.0 g/dL    RDW 20.0 (H) 11.6 - 14.5 %    PLATELET 908 202 - 879 K/uL    MPV 9.9 9.2 - 11.8 FL    NRBC 0.4 (H) 0  WBC    ABSOLUTE NRBC 0.05 (H) 0.00 - 0.01 K/uL    NEUTROPHILS 59 40 - 73 %    LYMPHOCYTES 22 21 - 52 %    MONOCYTES 9 3 - 10 %    EOSINOPHILS 5 0 - 5 %    BASOPHILS 0 0 - 2 %    IMMATURE GRANULOCYTES 5 (H) 0.0 - 0.5 %    ABS. NEUTROPHILS 6.7 1.8 - 8.0 K/UL    ABS. LYMPHOCYTES 2.5 0.9 - 3.6 K/UL    ABS. MONOCYTES 1.0 0.05 - 1.2 K/UL    ABS. EOSINOPHILS 0.6 (H) 0.0 - 0.4 K/UL    ABS. BASOPHILS 0.1 0.0 - 0.1 K/UL    ABS. IMM. GRANS. 0.6 (H) 0.00 - 0.04 K/UL    DF AUTOMATED     METABOLIC PANEL, COMPREHENSIVE    Collection Time: 02/24/22  4:09 AM   Result Value Ref Range    Sodium 144 136 - 145 mmol/L    Potassium 3.2 (L) 3.5 - 5.5 mmol/L    Chloride 109 100 - 111 mmol/L    CO2 29 21 - 32 mmol/L    Anion gap 6 3.0 - 18 mmol/L    Glucose 134 (H) 74 - 99 mg/dL    BUN 2 (L) 7.0 - 18 MG/DL    Creatinine 0.80 0.6 - 1.3 MG/DL    BUN/Creatinine ratio 3 (L) 12 - 20      GFR est AA >60 >60 ml/min/1.73m2    GFR est non-AA >60 >60 ml/min/1.73m2    Calcium 8.6 8.5 - 10.1 MG/DL    Bilirubin, total 0.6 0.2 - 1.0 MG/DL    ALT (SGPT) 16 13 - 56 U/L    AST (SGOT) 10 10 - 38 U/L    Alk.  phosphatase 83 45 - 117 U/L    Protein, total 6.1 (L) 6.4 - 8.2 g/dL    Albumin 1.8 (L) 3.4 - 5.0 g/dL    Globulin 4.3 (H) 2.0 - 4.0 g/dL    A-G Ratio 0.4 (L) 0.8 - 1.7         Assessment & Plan:     31 y.o. female with PMH breast cancer status post bilateral mastectomies with implant placement, most recently cholecystitis requiring cholecystectomy on 2/8/2022 presenting w/ syncope and abdominal pain.         Severe sepsis secondary to the left breast abscess   Now s/p removal of tissue expander of L breast with drainage and irrigation on 2/20   In ED: sinus tachycardia 140s-150s, lactic acidosis(~5), hyponatremia(128 baseline 137) VESTA (Cr 1.4 baseline 0.8), hyperglycemia (258)  Unclear why patient has left breast abscess since her breast surgery had been several months ago. Will rule out any abdominal source of infection since she recently had a gallbladder surgery about 10 days ago. - ICU was consulted recommended continuing to resuscitate patient with IV fluids. No need to transfer to ICU currently per ICU  -Lactic acid from 5.25->4.36-> 2.4  -Status post 5 L of fluid     Plan     -Continue 150 cc/h of LR  -continue Zosyn, continue vancomycin  -Monitor for signs of fluid overload respiratory status. Patient is young no history of CHF. Ordered proBNP, ICU ordered echo. Last echo looks like it is within normal limits  -Lactic acid every every 4 hours  -Follow-up on blood cultures urine cultures and pro-Mehran  - CT abdomen showed:  Trace residual fluid in the gallbladder fossa, decreased from previous. No  finding for postoperative abscess in the abdomen or pelvis.   -zofran 4mg IV prn (held po zofran)    Elevated BG  -250's-270's  -A1c 6.1% this admission  -on no home meds for T2DM, as pt states she is not Diabetic   Plan:  -TIDAC blood sugar checks  -Humalog 4U once  -started low dose SSI    #syncope+ fall possibly from orthostatic/ sepsis  EKG no ST changes trop X1 negative     Plan:   -Consider a CT head, if patient has persistent emesis or complains of HA  -May need imaging of /left hip if patient has continued pain     Breast cancer s/p bilateral mastectomy w implant  -consider reaching out to heme onc   -hold chemo po med capecitabine 500mg twice a day suppose to be on it starting 2/23, but her heme/onc physicians would like us to hold her medication for now in setting of acute disease.         Asthma  - cont prn albuterol     Depression  -cont lexapro 10mg daily     Bladder spasms  - hold oxybutynin         Diet  Adult   DVT Prophylaxis   SQH    GI Prophylaxis  none   Code status  Full   Disposition TBD     Point of Contact [employer]   Relationship:   [phone]      Tung Hale MD , PGY-1   [facility] Medicine   February 24, 2022, 8:22 AM

## 2022-02-24 NOTE — PROGRESS NOTES
*ATTENTION:  This note has been created by a medical student for educational purposes only. Please do not refer to the content of this note for clinical decision-making, billing, or other purposes. Please see attending physicians note to obtain clinical information on this patient. *         Medical Student Progress Note  [facility]       Patient: Fransisca Gardner MRN: [id number]  CSN: [id number]    YOB: 1995  Age: 31 y.o. Sex: female    DOA: 2/20/2022 LOS:  LOS: 4 days                    Subjective:      Acute events: Patient states she is doing \"much better\". States pain is better controlled with Tramadol, although still sore. Nursing requests dressing change prior to discharge if possible and clarification about wound care for patient's home health. ID recommendation for outpatient IV Daptomycin until 3/7/22 s/p negative blood culture at 48 hours and PICC placement by Dr. Yaritza Claros this morning. Case management confirmed IV abx are covered 100% my insurance and recommend contacting them so they can arrange [employer] to do patient education. Objective:     Patient Vitals for the past 24 hrs:   Temp Pulse Resp BP SpO2   02/24/22 0755 97.7 °F (36.5 °C) 73 25 120/69 99 %   02/24/22 0401 97.9 °F (36.6 °C) 72 28 133/77 100 %   02/23/22 2337 -- -- 26 -- --   02/23/22 2326 98.2 °F (36.8 °C) 73 (!) 32 132/82 100 %   02/23/22 1951 97.9 °F (36.6 °C) 80 20 137/63 100 %   02/23/22 1558 97.7 °F (36.5 °C) 82 14 (!) 103/58 100 %   02/23/22 1103 97.3 °F (36.3 °C) 77 20 122/69 100 %         Intake/Output Summary (Last 24 hours) at 2/24/2022 0845  Last data filed at 2/24/2022 3140  Gross per 24 hour   Intake 4560 ml   Output 6445 ml   Net -1885 ml       Physical Exam:   General: well-appearing, NAD  HEENT: Conjunctiva pink, sclera anicteric. PERRL. EOMI   CV:  RRR, no M/G/R  RESP: Unlabored breathing. Lungs CTAB, no wheezes, rales or rhonchi appreciated. Equal expansion bilaterally. BREAST: left breast bandage c/d/i without erythema, warmth or edema; ALVARO drain with scant pinkish red serosanguinous fluid   ABD:  Soft, nontender, nondistended. No hepatosplenomegaly. .  Ext:  No edema. Skin: Warm & dry. Psych: Normal mood and affect. Lab/Data Reviewed:  Recent Results (from the past 24 hour(s))   CBC WITH AUTOMATED DIFF    Collection Time: 02/24/22  4:09 AM   Result Value Ref Range    WBC 11.3 4.6 - 13.2 K/uL    RBC 2.88 (L) 4.20 - 5.30 M/uL    HGB 8.0 (L) 12.0 - 16.0 g/dL    HCT 25.4 (L) 35.0 - 45.0 %    MCV 88.2 78.0 - 100.0 FL    MCH 27.8 24.0 - 34.0 PG    MCHC 31.5 31.0 - 37.0 g/dL    RDW 20.0 (H) 11.6 - 14.5 %    PLATELET 486 006 - 773 K/uL    MPV 9.9 9.2 - 11.8 FL    NRBC 0.4 (H) 0  WBC    ABSOLUTE NRBC 0.05 (H) 0.00 - 0.01 K/uL    NEUTROPHILS 59 40 - 73 %    LYMPHOCYTES 22 21 - 52 %    MONOCYTES 9 3 - 10 %    EOSINOPHILS 5 0 - 5 %    BASOPHILS 0 0 - 2 %    IMMATURE GRANULOCYTES 5 (H) 0.0 - 0.5 %    ABS. NEUTROPHILS 6.7 1.8 - 8.0 K/UL    ABS. LYMPHOCYTES 2.5 0.9 - 3.6 K/UL    ABS. MONOCYTES 1.0 0.05 - 1.2 K/UL    ABS. EOSINOPHILS 0.6 (H) 0.0 - 0.4 K/UL    ABS. BASOPHILS 0.1 0.0 - 0.1 K/UL    ABS. IMM. GRANS. 0.6 (H) 0.00 - 0.04 K/UL    DF AUTOMATED     METABOLIC PANEL, COMPREHENSIVE    Collection Time: 02/24/22  4:09 AM   Result Value Ref Range    Sodium 144 136 - 145 mmol/L    Potassium 3.2 (L) 3.5 - 5.5 mmol/L    Chloride 109 100 - 111 mmol/L    CO2 29 21 - 32 mmol/L    Anion gap 6 3.0 - 18 mmol/L    Glucose 134 (H) 74 - 99 mg/dL    BUN 2 (L) 7.0 - 18 MG/DL    Creatinine 0.80 0.6 - 1.3 MG/DL    BUN/Creatinine ratio 3 (L) 12 - 20      GFR est AA >60 >60 ml/min/1.73m2    GFR est non-AA >60 >60 ml/min/1.73m2    Calcium 8.6 8.5 - 10.1 MG/DL    Bilirubin, total 0.6 0.2 - 1.0 MG/DL    ALT (SGPT) 16 13 - 56 U/L    AST (SGOT) 10 10 - 38 U/L    Alk.  phosphatase 83 45 - 117 U/L    Protein, total 6.1 (L) 6.4 - 8.2 g/dL    Albumin 1.8 (L) 3.4 - 5.0 g/dL    Globulin 4.3 (H) 2.0 - 4.0 g/dL    A-G Ratio 0.4 (L) 0.8 - 1.7         Assessment & Plan:     Ms. Lori Lowry is a 31year old  female with history of breast cancer s/p bilateral mastectomy with reconstruction (8/26/21) and cholecystitis with cholecystectomy (2/7/22) admitted for abdominal pain and syncope, found to have sepsis secondary to left breast abscess. Sepsis Secondary to Left Breast Abscess  Patient initially presented in the ED on 2/20 with tachycardia (140s-150s, now nsr), lactic acidosis (5.25, most recently 2.4) and leukocytosis (13.7, now 11.3) with left shift. CTA chest showed large fluid collection in the left breast, concerning for abscess, and was subsequently taken to OR same day with breast surgeon Dr. González Love for removal of tissue expander with drainage and irrigation. Patient had an VESTA at that time with creatinine of 1.4, which has since resolved with creatinine back at baseline at 0.8 today. CT abdomen ruled out any postoperative abscess in abdomen s/p cholecystectomy. Blood cultures on 2/20 grew MRSA, sensitive to many antibiotics. Repeat blood cultures on 2/22 show no growth 48 hours. Echo on 2/21 showing EF of 55-60% with normal wall motion and thickness, no evidence of vegetation. Plan to place PICC line today and have daptomycin per ID on discharge. Motrin 600mg PO Q6 and Tramadol 50mg PO Q8 PRN for pain. Hypokalemia  Potassium 3.2 this morning and has been slowly down trending during hospitalization. Will replenish. Most recent magnesium 1.8, consider repeat magnesium and replenishing as well. Syncope and fall  CT head without acute intracranial findings. EKG and troponin were negative. Initially, pain had left hip pain, which has since resolved. Neurological exam wnl. No need for additional imaging or testing at this time. PT and OT worked with patient on 2/23 and she was able to walk 250 feet in hallways and demonstrate ability to perform ADLs.      Prediabetes  Blood glucose on admission in 250s-270s. Hbg A1c 6.1%, putting patient in range of prediabetes. She is on low dose sliding scale insulin. Diabetic educator met with patient 2/23 and provided meal planning education, encouraged weight loss and exercise and provided Contour meter and strips as well as education on how to monitor blood glucose. Breast cancer   Scheduled to begin Capecitabine 500mg BID on 2/23, but consulted with patient's oncologist, Dr. Raina Watkins, and she recommended not beginning medication during this hospitalization. Will f/u with oncologist in March. Follow up with plastics and reconstruction with Dr. Miriam Carrillo on 2/28. Asthma   Well controlled on albuterol prn. No concerns for asthma exacerbation during hospitalization. Depression  Continue home Lexapro 10mg PO every day.      Diet Adult regular   DVT Prophylaxis Subcutaneous heparin   GI Prophylaxis none   Code status Full   Disposition TBD     Point of Contact So[employer] Jarvis  Relationship:   [phone]      Sadaf Key ,MS3   [facility]   February 24, 2022, 7:30 AM

## 2022-02-24 NOTE — HOME CARE
Discharge orders received for today. Updated referral with additional information. Faxed PICC line report to [facility]. Medication to be delivered today. Called Therese AHMADI liaison for [facility] and updated.

## 2022-02-25 VITALS
WEIGHT: 232 LBS | SYSTOLIC BLOOD PRESSURE: 138 MMHG | HEIGHT: 60 IN | TEMPERATURE: 97.8 F | HEART RATE: 87 BPM | OXYGEN SATURATION: 96 % | DIASTOLIC BLOOD PRESSURE: 85 MMHG | RESPIRATION RATE: 18 BRPM | BODY MASS INDEX: 45.55 KG/M2

## 2022-02-25 LAB
ALBUMIN SERPL-MCNC: 1.9 G/DL (ref 3.4–5)
ALBUMIN/GLOB SERPL: 0.5 {RATIO} (ref 0.8–1.7)
ALP SERPL-CCNC: 85 U/L (ref 45–117)
ALT SERPL-CCNC: 15 U/L (ref 13–56)
ANION GAP SERPL CALC-SCNC: 4 MMOL/L (ref 3–18)
AST SERPL-CCNC: 10 U/L (ref 10–38)
BASOPHILS # BLD: 0.2 K/UL (ref 0–0.1)
BASOPHILS NFR BLD: 2 % (ref 0–2)
BILIRUB SERPL-MCNC: 0.3 MG/DL (ref 0.2–1)
BUN SERPL-MCNC: 2 MG/DL (ref 7–18)
BUN/CREAT SERPL: 3 (ref 12–20)
CALCIUM SERPL-MCNC: 8.8 MG/DL (ref 8.5–10.1)
CHLORIDE SERPL-SCNC: 106 MMOL/L (ref 100–111)
CO2 SERPL-SCNC: 31 MMOL/L (ref 21–32)
CREAT SERPL-MCNC: 0.75 MG/DL (ref 0.6–1.3)
DIFFERENTIAL METHOD BLD: ABNORMAL
EOSINOPHIL # BLD: 0.6 K/UL (ref 0–0.4)
EOSINOPHIL NFR BLD: 7 % (ref 0–5)
ERYTHROCYTE [DISTWIDTH] IN BLOOD BY AUTOMATED COUNT: 20.3 % (ref 11.6–14.5)
GLOBULIN SER CALC-MCNC: 3.9 G/DL (ref 2–4)
GLUCOSE SERPL-MCNC: 115 MG/DL (ref 74–99)
HCT VFR BLD AUTO: 28.2 % (ref 35–45)
HGB BLD-MCNC: 8.4 G/DL (ref 12–16)
IMM GRANULOCYTES # BLD AUTO: 0 K/UL (ref 0–0.04)
IMM GRANULOCYTES NFR BLD AUTO: 0 % (ref 0–0.5)
LYMPHOCYTES # BLD: 2.4 K/UL (ref 0.9–3.6)
LYMPHOCYTES NFR BLD: 28 % (ref 21–52)
MCH RBC QN AUTO: 26.8 PG (ref 24–34)
MCHC RBC AUTO-ENTMCNC: 29.8 G/DL (ref 31–37)
MCV RBC AUTO: 90.1 FL (ref 78–100)
METAMYELOCYTES NFR BLD MANUAL: 1 %
MONOCYTES # BLD: 0.3 K/UL (ref 0.05–1.2)
MONOCYTES NFR BLD: 4 % (ref 3–10)
NEUTS BAND NFR BLD MANUAL: 3 %
NEUTS SEG # BLD: 5 K/UL (ref 1.8–8)
NEUTS SEG NFR BLD: 55 % (ref 40–73)
NRBC # BLD: 0.03 K/UL (ref 0–0.01)
NRBC BLD-RTO: 0.4 PER 100 WBC
PLATELET # BLD AUTO: 368 K/UL (ref 135–420)
PLATELET COMMENTS,PCOM: ABNORMAL
PMV BLD AUTO: 10.6 FL (ref 9.2–11.8)
POTASSIUM SERPL-SCNC: 4 MMOL/L (ref 3.5–5.5)
PROT SERPL-MCNC: 5.8 G/DL (ref 6.4–8.2)
RBC # BLD AUTO: 3.13 M/UL (ref 4.2–5.3)
RBC MORPH BLD: ABNORMAL
SODIUM SERPL-SCNC: 141 MMOL/L (ref 136–145)
WBC # BLD AUTO: 8.6 K/UL (ref 4.6–13.2)

## 2022-02-25 PROCEDURE — 85025 COMPLETE CBC W/AUTO DIFF WBC: CPT

## 2022-02-25 PROCEDURE — 80053 COMPREHEN METABOLIC PANEL: CPT

## 2022-02-25 PROCEDURE — 74011250636 HC RX REV CODE- 250/636

## 2022-02-25 PROCEDURE — 74011250637 HC RX REV CODE- 250/637: Performed by: STUDENT IN AN ORGANIZED HEALTH CARE EDUCATION/TRAINING PROGRAM

## 2022-02-25 PROCEDURE — 36415 COLL VENOUS BLD VENIPUNCTURE: CPT

## 2022-02-25 PROCEDURE — 74011000250 HC RX REV CODE- 250: Performed by: STUDENT IN AN ORGANIZED HEALTH CARE EDUCATION/TRAINING PROGRAM

## 2022-02-25 PROCEDURE — 2709999900 HC NON-CHARGEABLE SUPPLY

## 2022-02-25 PROCEDURE — 36592 COLLECT BLOOD FROM PICC: CPT

## 2022-02-25 PROCEDURE — 74011250636 HC RX REV CODE- 250/636: Performed by: FAMILY MEDICINE

## 2022-02-25 RX ORDER — TRAMADOL HYDROCHLORIDE 50 MG/1
50 TABLET ORAL
Qty: 6 TABLET | Refills: 0 | Status: SHIPPED | OUTPATIENT
Start: 2022-02-25 | End: 2022-02-28

## 2022-02-25 RX ADMIN — TRAMADOL HYDROCHLORIDE 50 MG: 50 TABLET, COATED ORAL at 16:17

## 2022-02-25 RX ADMIN — HEPARIN SODIUM 5000 UNITS: 5000 INJECTION INTRAVENOUS; SUBCUTANEOUS at 00:51

## 2022-02-25 RX ADMIN — HEPARIN SODIUM 5000 UNITS: 5000 INJECTION INTRAVENOUS; SUBCUTANEOUS at 09:27

## 2022-02-25 RX ADMIN — SODIUM CHLORIDE, PRESERVATIVE FREE 10 ML: 5 INJECTION INTRAVENOUS at 14:20

## 2022-02-25 RX ADMIN — ESCITALOPRAM OXALATE 10 MG: 10 TABLET ORAL at 09:26

## 2022-02-25 RX ADMIN — VANCOMYCIN HYDROCHLORIDE 1500 MG: 10 INJECTION, POWDER, LYOPHILIZED, FOR SOLUTION INTRAVENOUS at 16:18

## 2022-02-25 RX ADMIN — CETIRIZINE HYDROCHLORIDE 10 MG: 10 TABLET, FILM COATED ORAL at 09:26

## 2022-02-25 NOTE — ROUTINE PROCESS
Received bedside report from [facility], report included SBAR, MAR, and Kardex. Patient was resting quietly in bed, HOB elevated, bed in lowest position and pt oriented to call button. 5 - TRANSFER - OUT REPORT:    Verbal report given to [facility]) on Jeet Alvarado  being transferred to (unit) for routine progression of care       Report consisted of patients Situation, Background, Assessment and   Recommendations(SBAR). Information from the following report(s) SBAR, Kardex and MAR was reviewed with the receiving nurse. Lines:   PICC Double Lumen 02/24/22 Right;Brachial (Active)   Central Line Being Utilized Yes 02/24/22 1245   Criteria for Appropriate Use Long term IV/antibiotic administration 02/24/22 1600   Site Assessment Clean, dry, & intact 02/24/22 1600   Phlebitis Assessment 0 02/24/22 1600   Infiltration Assessment 0 02/24/22 1600   Date of Last Dressing Change 02/24/22 02/24/22 1600   Dressing Status Clean, dry, & intact 02/24/22 1600   Action Taken Open ports on tubing capped 02/24/22 1600   Dressing Type Disk with Chlorhexadine gluconate (CHG); Transparent 02/24/22 1600   Hub Color/Line Status Purple 02/24/22 1600   Positive Blood Return (Site #1) Yes 02/24/22 1600   Hub Color/Line Status Red 02/24/22 1600   Positive Blood Return (Site #2) Yes 02/24/22 1600   Alcohol Cap Used Yes 02/24/22 1600       Peripheral IV 02/20/22 Anterior;Right Forearm (Active)   Site Assessment Clean, dry, & intact 02/24/22 1600   Phlebitis Assessment 0 02/24/22 1600   Infiltration Assessment 0 02/24/22 1600   Dressing Status Clean, dry, & intact 02/24/22 1600   Dressing Type Tape;Transparent 02/24/22 1600   Hub Color/Line Status Blue;Capped 02/24/22 1600   Action Taken Open ports on tubing capped 02/24/22 1600   Alcohol Cap Used Yes 02/24/22 1600        Opportunity for questions and clarification was provided.       Patient transported with:   Monitor  Registered Nurse

## 2022-02-25 NOTE — HOME CARE
Patient did not discharge yesterday as planned, but discharge orders noted for today , notified [employer] rep Tatiana Garnica) that patient will discharge today,[employer] states that patient IV abx and supplies will be delivered this evening by [employer] delivery ,also [employer] will deliver an anaphylaxis kit since Daptomycin will be a 1st dose given , patient states her aunt Skip Geoff) is a nurse and will assist her with learning IV abx administration ; DME: RW has been ordered and provided to patient  ,[facility] will follow for SN for IV abx,wound care,PT ,OT; [facility] updated and [facility] central intake notified patient will discharge this evening and [facility] will need [facility] for tomorrow. CHE SOTO.

## 2022-02-25 NOTE — PROGRESS NOTES
Discharge order noted for today. Pt has been accepted to [facility] agency with IV ABX. Met with patient and she is agreeable to the transition plan today. Transport has been arranged through patient's family. Patient's home health  orders have been forwarded to  [address] [place] [address] via [facility]. Updated bedside RNGiana  to the transition plan.   Discharge information has been documented on the AVS.       SAYRA Glass, RN  Care Management

## 2022-02-25 NOTE — PROGRESS NOTES
Patient:Joy Saleh Kayla   Date:February 25, 2022     [facility]20[facility]. He/She will be able to return to work on 3/14/2022. If you have any questions, please call us at [phone].      Sincerely,     Dr. Kristian Arzate

## 2022-02-25 NOTE — PROGRESS NOTES
Infectious Disease progress Note        Reason: left breast tissue expander infection, gram-positive bacteremia    Current abx Prior abx   vancomycin since 2/20/22  cefepime 2/20  Zosyn 2/20-2/22     Lines:       Assessment :    31year old woman with left triple negative berast cancer, s/p bilateral mastectomy 8/26/21,  lap barrie  2/7/22 presented to [facility] on 2/20/2022 with left breast pain    Now with gram-positive bacteremia    Clinical presentation consistent with severe sepsis-present on admission due to  bloodstream infection-(positive blood culture 2/20, negative blood culture 2/22), left breast tissue expander infection/left breast  Patient has been appropriately managed with removal of tissue expander left breast on 2/20/2022  Intra-Op cultures 2/20-methicillin resistant Staph aureus    Most likely source of  infection is left breast tissue expander infection    Right-sided abdominal pain-likely postoperative changes due to recent cholecystectomy    Antibiotic management complicated due to documented history of penicillin allergy-patient does not think she has had any allergic reaction to penicillin. States that she has family history of penicillin allergy. Does not know the nature of allergic reaction. Currently has tolerated cefepime, piperacillin/tazobactam without difficulties-willing to use penicillin derivatives if needed    Recommendations:    1. Continue vancomycin while inpatient  2. Follow-up surgery recommendations  3.  patient will need outpatient IV antibiotics-recommend outpatient IV daptomycin till 3/7/2022. Recommend daptomycin since once daily dosing more convenient as outpatient    Home health orders on chart (click on \"chart review, other orders, IP home health). Discussed with . Antibiotics arranged     Above plan was discussed in details with patient, primary team, . Please call me if any further questions or concerns.  Will continue to participate in the care of this patient. HPI:    Feels better. Improved left breast discomfort    Past Medical History:   Diagnosis Date    Asthma     last attack years ago    Cancer [facility])     BREAST    Chronic pain of left thumb        Past Surgical History:   Procedure Laterality Date    HAND/FINGER SURGERY UNLISTED      left thumb    HX BREAST BIOPSY      HX BREAST RECONSTRUCTION Bilateral 2021    BILATERAL BREAST RECONSTRUCTION WITH TISSUE EXPANDERS & ADIPOFASCIAL FLAPS performed by Leyla Kramer MD at [address] HX  SECTION  2016    HX CYST INCISION AND DRAINAGE Left 2022    REMOVAL OF TISSUE EXPANDER LEFT BREAST, PLACEMENT OF DRAIN performed by Pancho Faria MD at [address] [facility]  [facility] HX MASTECTOMY Bilateral 2021    BILATERAL SKIN SPARING MASTECTOMY; LEFT SENTINEL NODE BIOPSY performed by Antony Villatoro MD at [address] [facility] Medication List    Details   ergocalciferol (Vitamin D2) 1,250 mcg (50,000 unit) capsule Take 50,000 Units by mouth every seven (7) days. escitalopram oxalate (Lexapro) 10 mg tablet Take 10 mg by mouth daily. loratadine 10 mg cap Take 10 mg by mouth. ondansetron hcl (ZOFRAN) 8 mg tablet Take 8 mg by mouth every eight (8) hours as needed for Nausea or Vomiting. OXYBUTYNIN CHLORIDE PO Take 5 mg by mouth daily. albuterol (PROVENTIL HFA, VENTOLIN HFA, PROAIR HFA) 90 mcg/actuation inhaler Take 1 Puff by inhalation every four (4) hours as needed for Wheezing.   Qty: 1 Inhaler, Refills: 0             Current Facility-Administered Medications   Medication Dose Route Frequency    traMADoL (ULTRAM) tablet 50 mg  50 mg Oral Q8H PRN    heparin (porcine) injection 5,000 Units  5,000 Units SubCUTAneous Q8H    vancomycin (VANCOCIN) 1500 mg in  ml infusion  1,500 mg IntraVENous Q18H    ibuprofen (MOTRIN) tablet 600 mg  600 mg Oral Q6H PRN    sodium chloride (NS) flush 5-10 mL  5-10 mL IntraVENous PRN    albuterol (PROVENTIL HFA, VENTOLIN HFA, PROAIR HFA) inhaler 1 Puff  1 Puff Inhalation Q4H PRN    [Held by provider] ergocalciferol capsule 50,000 Units  50,000 Units Oral Q7D    escitalopram oxalate (LEXAPRO) tablet 10 mg  10 mg Oral DAILY    cetirizine (ZYRTEC) tablet 10 mg  10 mg Oral DAILY    [Held by provider] ondansetron hcl (ZOFRAN) tablet 8 mg  8 mg Oral Q8H PRN    [Held by provider] oxybutynin (DITROPAN) tablet 5 mg  5 mg Oral DAILY    sodium chloride (NS) flush 5-40 mL  5-40 mL IntraVENous Q8H    sodium chloride (NS) flush 5-40 mL  5-40 mL IntraVENous PRN    ondansetron (ZOFRAN) injection 4 mg  4 mg IntraVENous Q6H PRN    glucose chewable tablet 16 g  16 g Oral PRN    glucagon (GLUCAGEN) injection 1 mg  1 mg IntraMUSCular PRN    dextrose 10% infusion 0-250 mL  0-250 mL IntraVENous PRN       Allergies: Percocet [oxycodone-acetaminophen], Tylenol [acetaminophen], Dilaudid [hydromorphone], Penicillins, and Gadavist [gadobutrol]    Family History   Problem Relation Age of Onset    Diabetes Mother     No Known Problems Father      Social History     Socioeconomic History    Marital status: SINGLE     Spouse name: Not on file    Number of children: Not on file    Years of education: Not on file    Highest education level: Not on file   Occupational History    Not on file   Tobacco Use    Smoking status: Never Smoker    Smokeless tobacco: Never Used   Substance and Sexual Activity    Alcohol use: Yes     Comment: OCASSIONAL    Drug use: Never    Sexual activity: Yes     Birth control/protection: None   Other Topics Concern     Service Not Asked    Blood Transfusions Not Asked    Caffeine Concern Not Asked    Occupational Exposure Not Asked    Hobby Hazards Not Asked    Sleep Concern Not Asked    Stress Concern Not Asked    Weight Concern Not Asked    Special Diet Not Asked    Back Care Not Asked    Exercise Not Asked    Bike Helmet Not Asked   [address] Not Asked    Self-Exams Not Asked   Social History Narrative    Not on file     Social Determinants of Health     Financial Resource Strain:     Difficulty of Paying Living Expenses: Not on file   Food Insecurity:     Worried About Running Out of Food in the Last Year: Not on file    Indy of Food in the Last Year: Not on file   Transportation Needs:     Lack of Transportation (Medical): Not on file    Lack of Transportation (Non-Medical): Not on file   Physical Activity:     Days of Exercise per Week: Not on file    Minutes of Exercise per Session: Not on file   Stress:     Feeling of Stress : Not on file   Social Connections:     Frequency of Communication with Friends and Family: Not on file    Frequency of Social Gatherings with Friends and Family: Not on file    Attends Jew Services: Not on file    Active Member of [address] [place] [address] or Organizations: Not on file    Attends Club or Organization Meetings: Not on file    Marital Status: Not on file   Intimate Partner Violence:     Fear of Current or Ex-Partner: Not on file    Emotionally Abused: Not on file    Physically Abused: Not on file    Sexually Abused: Not on file   Housing Stability:     Unable to Pay for Housing in the Last Year: Not on file    Number of [place] in the Last Year: Not on file    Unstable Housing in the Last Year: Not on file     Social History     Tobacco Use   Smoking Status Never Smoker   Smokeless Tobacco Never Used        Temp (24hrs), Av.6 °F (36.4 °C), Min:96.9 °F (36.1 °C), Max:97.9 °F (36.6 °C)    Visit Vitals  /77   Pulse 61   Temp 97.6 °F (36.4 °C)   Resp 16   Ht 5' (1.524 m)   Wt 105.2 kg (232 lb)   SpO2 99%   BMI 45.31 kg/m²       ROS: 12 point ROS obtained in details. Pertinent positives as mentioned in HPI,   otherwise negative    Physical Exam:    General: Well developed, well nourished female /sitting on the  bed AAOx3 in no acute distress.     General:   awake alert and oriented   HEENT:  Normocephalic, atraumatic,  EOMI, no scleral icterus or pallor; no conjunctival hemmohage;  nasal and oral mucous are moist and without evidence of lesions. Neck supple, no bruits. Lymph Nodes:   not examined   Lungs:   non-labored, bilaterally clear to auscultation- no crackles wheezes rales or rhonchi   Heart:  RRR, s1 and s2; no rubs or gallops, no edema   Abdomen:  soft, non-distended, active bowel sounds, no hepatomegaly, no splenomegaly. Right upper/lower quadrant tenderness-no guarding/rigidity   Genitourinary:  deferred   Extremities:   no clubbing, cyanosis; no joint effusions or swelling; Full ROM of all large joints to the upper and lower extremities; muscle mass appropriate for age   Neurologic:  No gross focal sensory abnormalities; 5/5 muscle strength to upper and lower extremities. Speech appropriate. Cranial nerves intact                        Skin:  Surgical changes on chest.  Left chest dressing not removed   Back:  no spinal or paraspinal muscle tenderness or rigidity, no CVA tenderness     Psychiatric:  No suicidal or homicidal ideations, appropriate mood and affect         Labs: Results:   Chemistry Recent Labs     02/25/22  0525 02/24/22  1625 02/24/22  0409 02/23/22  0512 02/23/22 0512   * 135* 134*   < > 115*    141 144   < > 142   K 4.0 3.9 3.2*   < > 3.7    106 109   < > 110   CO2 31 29 29   < > 27   BUN 2* 3* 2*   < > 4*   CREA 0.75 0.80 0.80   < > 0.76   CA 8.8 8.8 8.6   < > 8.3*   AGAP 4 6 6   < > 5   BUCR 3* 4* 3*   < > 5*   AP 85  --  83  --  73   TP 5.8*  --  6.1*  --  5.0*   ALB 1.9*  --  1.8*  --  1.7*   GLOB 3.9  --  4.3*  --  3.3   AGRAT 0.5*  --  0.4*  --  0.5*    < > = values in this interval not displayed.       CBC w/Diff Recent Labs     02/25/22  0525 02/24/22  0409 02/23/22 0512   WBC 8.6 11.3 10.3   RBC 3.13* 2.88* 2.82*   HGB 8.4* 8.0* 7.9*   HCT 28.2* 25.4* 24.9*    393 345   GRANS 55 59 62   LYMPH 28 22 20*   EOS 7* 5 3      Microbiology Recent Labs 02/22/22  1138   CULT NO GROWTH 3 DAYS          RADIOLOGY:    All available imaging studies/reports in [facility] for this admission were reviewed        Disclaimer: Sections of this note are dictated utilizing voice recognition software, which may have resulted in some phonetic based errors in grammar and contents. Even though attempts were made to correct all the mistakes, some may have been missed, and remained in the body of the document. If questions arise, please contact our department.     Dr. Alma Adorno, Infectious Disease Specialist  [phone]  February 25, 2022  7:23 AM

## 2022-02-25 NOTE — ROUTINE PROCESS
TRANSFER - IN REPORT:    Verbal report received from DAIANA Barth(name) on Jewel Smiles  being received from CVT SD(unit) for routine progression of care      Report consisted of patients Situation, Background, Assessment and   Recommendations(SBAR). Information from the following report(s) SBAR, Kardex, Procedure Summary, Intake/Output, MAR and Recent Results was reviewed with the receiving nurse. Opportunity for questions and clarification was provided. Assessment completed upon patients arrival to unit and care assumed. Primary Nurse Wicho Membreno RN and Socorro Caro RN performed a dual skin assessment on this patient Impairment noted- see wound doc flow sheet  Jose score is 21    Bedside and Verbal shift change report given to [facility] (oncoming nurse) by me (offgoing nurse). Report included the following information SBAR, Kardex, Procedure Summary, Intake/Output, MAR and Recent Results.

## 2022-02-25 NOTE — PROGRESS NOTES
*ATTENTION:  This note has been created by a medical student for educational purposes only. Please do not refer to the content of this note for clinical decision-making, billing, or other purposes. Please see attending physicians note to obtain clinical information on this patient. *         Medical Student Progress Note  [facility]       Patient: Latasha Oliveros MRN: [id number]  CSN: [id number]    YOB: 1995  Age: 31 y.o. Sex: female    DOA: 2/20/2022 LOS:  LOS: 5 days                    Subjective:      Acute events: Patient feels better today and pain is improved. She is ready for d/c for appointment at 3:45 today with general surgery, Dr. Nile Dyer. Patient stayed overnight due to CTA head, per request radiology. Objective:     Patient Vitals for the past 24 hrs:   Temp Pulse Resp BP SpO2   02/25/22 0501 97.6 °F (36.4 °C) 61 16 131/77 99 %   02/25/22 0232 96.9 °F (36.1 °C) 66 18 132/75 100 %   02/25/22 0030 97.9 °F (36.6 °C) 91 19 131/77 98 %   02/24/22 2034 97.7 °F (36.5 °C) 67 20 126/85 100 %   02/24/22 1528 97.8 °F (36.6 °C) 73 19 (!) 123/58 100 %   02/24/22 1221 97.5 °F (36.4 °C) 78 16 126/70 100 %         Intake/Output Summary (Last 24 hours) at 2/25/2022 0901  Last data filed at 2/25/2022 0030  Gross per 24 hour   Intake 740 ml   Output 1065 ml   Net -325 ml       Physical Exam:   General: well-appearing, NAD  HEENT: Conjunctiva pink, sclera anicteric. PERRL. EOMI   CV:  RRR, no M/G/R  RESP: Unlabored breathing. Lungs CTAB, no wheezes, rales or rhonchi appreciated. Equal expansion bilaterally. BREAST: left breast bandage c/d/i without erythema, warmth or edema; ALVARO drain with scant pinkish serosanguinous fluid   ABD:  Soft, nontender, nondistended. No hepatosplenomegaly. .  Ext:  No edema. Skin: Warm & dry. Psych: Normal mood and affect.      Lab/Data Reviewed:  Recent Results (from the past 24 hour(s))   METABOLIC PANEL, BASIC    Collection Time: 02/24/22 4:25 PM   Result Value Ref Range    Sodium 141 136 - 145 mmol/L    Potassium 3.9 3.5 - 5.5 mmol/L    Chloride 106 100 - 111 mmol/L    CO2 29 21 - 32 mmol/L    Anion gap 6 3.0 - 18 mmol/L    Glucose 135 (H) 74 - 99 mg/dL    BUN 3 (L) 7.0 - 18 MG/DL    Creatinine 0.80 0.6 - 1.3 MG/DL    BUN/Creatinine ratio 4 (L) 12 - 20      GFR est AA >60 >60 ml/min/1.73m2    GFR est non-AA >60 >60 ml/min/1.73m2    Calcium 8.8 8.5 - 10.1 MG/DL   CBC WITH AUTOMATED DIFF    Collection Time: 02/25/22  5:25 AM   Result Value Ref Range    WBC 8.6 4.6 - 13.2 K/uL    RBC 3.13 (L) 4.20 - 5.30 M/uL    HGB 8.4 (L) 12.0 - 16.0 g/dL    HCT 28.2 (L) 35.0 - 45.0 %    MCV 90.1 78.0 - 100.0 FL    MCH 26.8 24.0 - 34.0 PG    MCHC 29.8 (L) 31.0 - 37.0 g/dL    RDW 20.3 (H) 11.6 - 14.5 %    PLATELET 473 633 - 627 K/uL    MPV 10.6 9.2 - 11.8 FL    NRBC 0.4 (H) 0  WBC    ABSOLUTE NRBC 0.03 (H) 0.00 - 0.01 K/uL    NEUTROPHILS 55 40 - 73 %    BAND NEUTROPHILS 3 %    LYMPHOCYTES 28 21 - 52 %    MONOCYTES 4 3 - 10 %    EOSINOPHILS 7 (H) 0 - 5 %    BASOPHILS 2 0 - 2 %    METAMYELOCYTES 1 %    IMMATURE GRANULOCYTES 0 0.0 - 0.5 %    ABS. NEUTROPHILS 5.0 1.8 - 8.0 K/UL    ABS. LYMPHOCYTES 2.4 0.9 - 3.6 K/UL    ABS. MONOCYTES 0.3 0.05 - 1.2 K/UL    ABS. EOSINOPHILS 0.6 (H) 0.0 - 0.4 K/UL    ABS. BASOPHILS 0.2 (H) 0.0 - 0.1 K/UL    ABS. IMM.  GRANS. 0.0 0.00 - 0.04 K/UL    DF MANUAL      PLATELET COMMENTS ADEQUATE PLATELETS      RBC COMMENTS ANISOCYTOSIS  2+        RBC COMMENTS TARGET CELLS  1+        RBC COMMENTS TEARDROP CELLS  1+        RBC COMMENTS POIKILOCYTOSIS  2+        RBC COMMENTS POLYCHROMASIA  1+        RBC COMMENTS OVALOCYTES  1+       METABOLIC PANEL, COMPREHENSIVE    Collection Time: 02/25/22  5:25 AM   Result Value Ref Range    Sodium 141 136 - 145 mmol/L    Potassium 4.0 3.5 - 5.5 mmol/L    Chloride 106 100 - 111 mmol/L    CO2 31 21 - 32 mmol/L    Anion gap 4 3.0 - 18 mmol/L    Glucose 115 (H) 74 - 99 mg/dL    BUN 2 (L) 7.0 - 18 MG/DL Creatinine 0.75 0.6 - 1.3 MG/DL    BUN/Creatinine ratio 3 (L) 12 - 20      GFR est AA >60 >60 ml/min/1.73m2    GFR est non-AA >60 >60 ml/min/1.73m2    Calcium 8.8 8.5 - 10.1 MG/DL    Bilirubin, total 0.3 0.2 - 1.0 MG/DL    ALT (SGPT) 15 13 - 56 U/L    AST (SGOT) 10 10 - 38 U/L    Alk. phosphatase 85 45 - 117 U/L    Protein, total 5.8 (L) 6.4 - 8.2 g/dL    Albumin 1.9 (L) 3.4 - 5.0 g/dL    Globulin 3.9 2.0 - 4.0 g/dL    A-G Ratio 0.5 (L) 0.8 - 1.7         CTA HEAD 02/24/2022    Narrative  EXAM: CT VENOGRAPHY HEAD    CLINICAL INDICATIONS: abnormal head CT- radiologist rec'd CT head venogram  dizziness, visual changes    TECHNIQUE: Helical CT scan of the head was performed during rapid IV bolus  contrast administration with delayed for venous status. These data were  reconstructed at .625 mm intervals for vascular analysis. The data was also  reviewed at 2.5 mm intervals for accompanying soft tissue analysis. 3D post  processed images, including surface shaded displays, were produced for this exam  on independent console, permanently archived and interpreted. All CT scans at this facility are performed using dose optimization technique as  appropriate to a performed exam, to include automated exposure control,  adjustment of the MA and/or kV according to patient size (including appropriate  matching for site-specific examinations) or use of  iterative reconstruction  technique. CONTRAST: 100 cc [id number]    COMPARISON: None    CTV HEAD VASCULAR ANALYSIS:  Major dural venous sinuses: Patent major intracranial vasculature. The superior  sagittal, inferior sagittal, bilateral transverse, and sigmoid sinuses are  patent. There is a 1.7 cm ovoid hypodensity displacing the margins of the left  transverse sinus adjacent to the torcula. Venous structures are present within  the lesion which may represent the site of arachnoid base.  The vessels drain to  the superior vermin vein which appears enlarged with relative decreased size of  the straight sinus. SOFT TISSUE ANALYSIS:  Orbits: Unremarkable  Paranasal sinuses: Clear  Brain: No hemorrhage or mass effect. Bones: Minimal scalloping of the left paramedian calvarium adjacent to the left  transverse sinus lesion suggestive of chronic lesion. Impression  1.7 cm nonenhancing lesion within the proximal left transverse sinus. Imaging  features are consistent with a giant arachnoid granulation. This markedly  narrows but does not the left transverse sinus. Configuration is not consistent with venous sinus thrombosis. Assessment & Plan:     Ms. Ebony Rose is a 31year old  female with history of breast cancer s/p bilateral mastectomy with reconstruction (8/26/21) and cholecystitis with cholecystectomy (2/7/22) admitted for abdominal pain and syncope, found to have sepsis secondary to left breast abscess. Sepsis Secondary to Left Breast Abscess  Patient initially presented in the ED on 2/20 with tachycardia (140s-150s, now nsr), lactic acidosis (5.25, most recently 2.4) and leukocytosis (13.7, now 11.3) with left shift. CTA chest showed large fluid collection in the left breast, concerning for abscess, and was subsequently taken to OR same day with breast surgeon Dr. Brie Ordonez for removal of tissue expander with drainage and irrigation. Patient had an VESTA at that time with creatinine of 1.4, which has since resolved with creatinine back at baseline at 0.8 today. CT abdomen ruled out any postoperative abscess in abdomen s/p cholecystectomy. Blood cultures on 2/20 grew MRSA, sensitive to many antibiotics. Repeat blood cultures on 2/22 show no growth 48 hours. Echo on 2/21 showing EF of 55-60% with normal wall motion and thickness, no evidence of vegetation. PICC line was placed on 2/24. Patient currently on IV Vancomycin, will have daptomycin per ID on discharge. She has follow up appointment today with general surgery Dr. Rodrigo Cummings at 3:45.      Motrin 600mg PO Q6 and Tramadol 50mg PO Q8 PRN for pain. Will write for outpatient Tramadol for breakthrough pain with five pills 50mg PO. Syncope and fall  CT head non-contrast on 2/22 without acute intracranial findings. EKG and troponin were negative. Initially, pain had left hip pain, which has since resolved. Neurological exam wnl. PT and OT worked with patient on 2/23 and she was able to walk 250 feet in hallways and demonstrate ability to perform ADLs. 2/24 radiology recommended CTA due to hypodensity found on CT head that was likely arachnoid granulation. CTA head 2/24 showing 1.7cm non-enhancing lesion in left proximal transverse sinus, likely giant arachnoid granulation and no concern for venous sinus thrombosis. This finding seems to be incidental and large arachnoid granulations are most commonly incidental findings in patients, but can rarely cause symptoms related to sinus obstruction, leading to venous hypertension and potential for endovascular stenting. This does not appear to be an emergent concern and would recommend outpatient follow up with neurology, especially if patient begins to develop uncharacteristic headaches or increased episodes of presyncope. Hypokalemia  Potassium 3.2 on 2/24. K+ was replenished and K+ this a.m. now 4.0. Most recent magnesium 1.8, consider repeat magnesium if recurrent hypokalemia. Prediabetes  Blood glucose on admission in 250s-270s, currently 115 today. Hbg A1c 6.1%, putting patient in range of prediabetes. She is on low dose sliding scale insulin. Diabetic educator met with patient 2/23 and provided meal planning education, encouraged weight loss and exercise and provided Contour meter and strips as well as education on how to monitor blood glucose. Breast cancer   Scheduled to begin Capecitabine 500mg BID on 2/23, but consulted with patient's oncologist, Dr. Romario Wooten, and she recommended not beginning medication during this hospitalization.  Will f/u with oncologist in March. Follow up with plastics and reconstruction with Dr. Norma Etienne on 2/28. Asthma   Well controlled on albuterol prn. No concerns for asthma exacerbation during hospitalization. Depression  Continue home Lexapro 10mg PO every day.      Diet Adult regular   DVT Prophylaxis Subcutaneous heparin   GI Prophylaxis none   Code status Full   Disposition TBD     Point of Contact Soo Jarvis  Relationship:   [phone]      YVON Langston   [facility]   February 25, 2022, 7:30 AM

## 2022-02-25 NOTE — PROGRESS NOTES
Problem: Patient Education: Go to Patient Education Activity  Goal: Patient/Family Education  Outcome: Progressing Towards Goal     Problem: Sepsis: Day 6  Goal: Off Pathway (Use only if patient is Off Pathway)  Outcome: Progressing Towards Goal  Goal: *Oxygen saturation within defined limits  Outcome: Progressing Towards Goal  Goal: *Vital signs within defined limits  Outcome: Progressing Towards Goal  Goal: *Tolerating diet  Outcome: Progressing Towards Goal  Goal: *Demonstrates progressive activity  Outcome: Progressing Towards Goal  Goal: Influenza immunization  Outcome: Progressing Towards Goal  Goal: *Pneumococcal immunization  Outcome: Progressing Towards Goal  Goal: Activity/Safety  Outcome: Progressing Towards Goal  Goal: Diagnostic Test/Procedures  Outcome: Progressing Towards Goal  Goal: Nutrition/Diet  Outcome: Progressing Towards Goal  Goal: Discharge Planning  Outcome: Progressing Towards Goal  Goal: Medications  Outcome: Progressing Towards Goal  Goal: Respiratory  Outcome: Progressing Towards Goal  Goal: Treatments/Interventions/Procedures  Outcome: Progressing Towards Goal  Goal: Psychosocial  Outcome: Progressing Towards Goal     Problem: Falls - Risk of  Goal: *Absence of Falls  Description: Document Alesha Fall Risk and appropriate interventions in the flowsheet.   Outcome: Progressing Towards Goal  Note: Fall Risk Interventions:  Mobility Interventions: Assess mobility with egress test,Bed/chair exit alarm,Patient to call before getting OOB         Medication Interventions: Assess postural VS orthostatic hypotension,Bed/chair exit alarm,Evaluate medications/consider consulting pharmacy,Patient to call before getting OOB,Teach patient to arise slowly    Elimination Interventions: Bed/chair exit alarm,Call light in reach,Elevated toilet seat,Patient to call for help with toileting needs,Toilet paper/wipes in reach    History of Falls Interventions: Bed/chair exit alarm,Consult care management for discharge planning,Door open when patient unattended,Evaluate medications/consider consulting pharmacy,Investigate reason for fall,Room close to nurse's station         Problem: Patient Education: Go to Patient Education Activity  Goal: Patient/Family Education  Outcome: Progressing Towards Goal     Problem: Pain  Goal: *Control of Pain  Outcome: Progressing Towards Goal  Goal: *PALLIATIVE CARE:  Alleviation of Pain  Outcome: Progressing Towards Goal     Problem: Patient Education: Go to Patient Education Activity  Goal: Patient/Family Education  Outcome: Progressing Towards Goal     Problem: Surgical Wound Care  Goal: *Non-infected Wound: Absence of infection signs and symptoms  Description: Infection control procedures (eg: clean dressings, clean gloves, hand washing, precautions to isolate wound from contamination, sterile instruments used for wound debridement) should be implemented. Outcome: Progressing Towards Goal  Goal: *Infected Wound: Prevention of further infection and promotion of healing  Description: Consider the use of systemic antibiotics in patients with cellulitis, osteomyelitis, bacteremia, or sepsis if there are no contraindications.   Outcome: Progressing Towards Goal  Goal: *Improvement of existing wound and maintenance of skin integrity  Outcome: Progressing Towards Goal     Problem: Patient Education: Go to Patient Education Activity  Goal: Patient/Family Education  Outcome: Progressing Towards Goal

## 2022-02-26 ENCOUNTER — HOME CARE VISIT (OUTPATIENT)
Dept: SCHEDULING | Facility: HOME HEALTH | Age: 27
End: 2022-02-26
Payer: MEDICAID

## 2022-02-26 LAB
BACTERIA SPEC CULT: NORMAL
SERVICE CMNT-IMP: NORMAL

## 2022-02-26 PROCEDURE — 400013 HH SOC

## 2022-02-26 PROCEDURE — G0299 HHS/HOSPICE OF RN EA 15 MIN: HCPCS

## 2022-02-26 NOTE — PROGRESS NOTES
I have seen and examined the patient. Please see resident physician's note for details of HPI, assessment and plan. Overall, pt reports feeling well, no acute changes/concerns. She has not had recurrent headache and vision remains at baseline. We reviewed CT venography results and recommendations for outpt Neurology follow-up. We reviewed return precautions. Pt is stable for discharge home at this time.

## 2022-02-27 ENCOUNTER — HOME CARE VISIT (OUTPATIENT)
Dept: SCHEDULING | Facility: HOME HEALTH | Age: 27
End: 2022-02-27
Payer: MEDICAID

## 2022-02-27 VITALS
TEMPERATURE: 97.6 F | RESPIRATION RATE: 18 BRPM | OXYGEN SATURATION: 99 % | SYSTOLIC BLOOD PRESSURE: 108 MMHG | HEART RATE: 88 BPM | DIASTOLIC BLOOD PRESSURE: 78 MMHG

## 2022-02-27 PROCEDURE — G0299 HHS/HOSPICE OF RN EA 15 MIN: HCPCS

## 2022-02-28 ENCOUNTER — HOME CARE VISIT (OUTPATIENT)
Dept: HOME HEALTH SERVICES | Facility: HOME HEALTH | Age: 27
End: 2022-02-28
Payer: MEDICAID

## 2022-02-28 ENCOUNTER — HOME CARE VISIT (OUTPATIENT)
Dept: SCHEDULING | Facility: HOME HEALTH | Age: 27
End: 2022-02-28
Payer: MEDICAID

## 2022-02-28 VITALS
RESPIRATION RATE: 20 BRPM | DIASTOLIC BLOOD PRESSURE: 78 MMHG | TEMPERATURE: 98.4 F | SYSTOLIC BLOOD PRESSURE: 118 MMHG | OXYGEN SATURATION: 99 % | HEART RATE: 90 BPM

## 2022-02-28 VITALS
HEART RATE: 78 BPM | DIASTOLIC BLOOD PRESSURE: 70 MMHG | TEMPERATURE: 97.8 F | OXYGEN SATURATION: 98 % | SYSTOLIC BLOOD PRESSURE: 130 MMHG | RESPIRATION RATE: 14 BRPM

## 2022-02-28 LAB
BACTERIA SPEC CULT: NORMAL
SERVICE CMNT-IMP: NORMAL

## 2022-02-28 PROCEDURE — G0299 HHS/HOSPICE OF RN EA 15 MIN: HCPCS

## 2022-02-28 NOTE — HOME HEALTH
Summary of clinical health condition: 31 y.o. femal e with PMH breast cancer status post bilateral mastectomies with implant placement, most recently cholecystitis requiring cholecystectomy on 2/8/2022 presenting w/ syncope and abdominal pain. Pt  completed neoadjuvant chemotherapy. Pt states that she is seen by Dr. Josefina Villalobos. Patient states that she was taking some chemotherapy capecitabine 500mg twice a day for 2 weeks. On admission pt left breast was swollen and painful with fluid collection on imaging. She was felt to be septic from infected expander, possibly infected hematoma from recent trauma. Copius pus was evacuated and wound was irrigated. A 15 round Ced drain was placed. Pt was stable after procedure. Pt was started are on Vanc and Zosyn after initially being on cefepime in the ED. Abx were narrowed to just IV Vanc once bcx came back (+ for gram positive bacteremia). ID recycled outpatie nt IV daptomycin till 3/7/2022 if repeat blood cultures 2/22 remain negative in a.m. ID Recommend daptomycin since once daily dosing more convenient as outpatient. PICC line was place prior to d/c. Discharge was on hold pending repeat cranial imaging on 2/24, so pt got CT head venography which had no acutely concerning findings. Dx: Left breast tissue expander infection,s/p removal of tissue expander.   ,   Medications reconciled, all medications are at home. Tramadol needs to be  pharmacy,  Loratidine & Zofran d/c per pt. The following education was provided regarding medications, medication interactions, and look a like medications: N/A all meds reviewed. Discussed importance of compliance, timely taking all prescribed meds, proper dosage and freq. Teaching provided with high risk medication: Daptomycin S/E: N/V, constipation, diarrhea, dizziness, trouble of sleeping and sweating. Reviewed Tramadol S/E; constipation, dry mouth, heart burn or indigestion, nervousness, sleepiness & changes in mood.  Medications are effective at this time. No new medication at this time. (SN call [employer] verified regarding Daptomycin dose mg/kg, spoke to [facility] pharmacist, replied Dr. Sofi Anthony will be called and f/u. Verbalized to continue current dosage until new order received. Caregiver: Pt has caregiver/Aunt Lisandro is available to assist with groceries, run errands, willing to assist with IV med infusion, provide reminders with daily medications and accompany to MD hoang. Skilled care provided: Teaching disease and medication management, completed assessment, L breast surgical wound is closed, no swelling or redness and dry (Scheduled to Dr. Angelita Loo 3/1 for suture removal). KEDAR PICC site no swelling or redness, dressing intact (dated 2/24), red ports flush with NS then 3 ml NS, Purple port plush 10 ml NS then infuse Daptomycin 465 ml over 30 ml NS to be infused 30 mins. (Name and exp date verified before infusing) Procedure with IV Daptomycin infusion including flushing with 10 ml NS and 3ml Heparin discussed properly to pt/CG and informed to continue infusion during non-nursing days. L breast ALVARO drain dressing changed; cleansed site and cath with alcohol pads then covered with Tegaderm. Drained 15 ml serosanguineous amt of fluid. Pt demonstrated how to empty ALVARO drain daily and record reading. Completed/explained admission packet, POC, SNV freq and D/C plan to pt with good understanding. Pt. tolerated well during IV infusion no adverse affect and ALVARO drain dressing with no c/o. Patient education provided this visit to include: Discussed intervention to prevent infection; hand washing or using hand  when contact contaminated items, wearing face mask during clinician's visit or going out to public and avoiding sick person. Discussed importance of hand washing, gloving and preparing clean non -sterile field for medical supplies during IV infusion.  Keeping PICC line dressing intact, dry and clean, reinforce with tape when starts coming off. Continue to empty ALVARO drain daily. Keeping L breast surgical dry and clean and monitor for drainage or swelling. Encouraged ambulate q 2hrs as tolerated, safety and fall prec; clearing walkway, removing clutters to prevent falls, avoid getting up too quickly when feeling weak, dizzy, and to call for assistance prn. Pacing activity/energy conservation; rest in bet activity and deep breathing exercises. Increasing protein intake to enhance healing process. Reviewed S/S of infection; fever 100.4, increase pain, redness, swelling, coughing with yellow thick sputum, wound dehisced, purulent wound drainage with foul smell, cloudy urine with foul smell, not feeling well 2-3 days, increase weight 3-5 lbs within a week. SOB, and to call [facility] or MD for assistance if experiencing any of these S/S. To call 911 with chest pains, facial drooping, difficulty talking, non arousable/unconscious and uncontrollable bleeding. Patient/caregiver degree of understanding: Pt has good understanding with the education provided during visit. Home health supplies by type and quantity ordered/delivered this visit include: N/A  Pt./CG instructed on plan of care, PT, OT eval/treat SN freq visit; 1d1, 3w1, 1w3, 2 prn and D/C plan. Home exercise program/Homework provided: Ambulation, HEP deep breathing exercises 10x when having SOB, pain and anxiety, IS 10x q 1-2 hrs. Plan of care and admission to home health status called to attending physician: Dr. Wade Baugh MD  was informed POC and admission completed 1/26/22. Discharge planning discussed with patient and caregiver. Discharge planning as follows: Pt/CG will be able to manage disease and medication independently, and health condition stable. Pt/Caregiver did verbalize understanding of discharge planning.   Patient/caregiver encouraged/instructed to keep appointment as lack of follow through with physician appointment could result in discontinuation of home care services for non-compliance. The [facility] [address] of Rights was verbally reviewed and signed by pt on this visit.  The patient or representative was given the opportunity to ask pertinent questions regarding the bill of rights                  COVID - 19 Screening completed before visit:     Denies and no family member  has any of these S/S:  Fever, dry cough, chills, sore throat diarrhea, body aches, not feeling well and loss of taste

## 2022-02-28 NOTE — HOME HEALTH
Caregiver involvement: Rio Ayon is caregiver, she  Assists with ADLs, Medication management, Transportation to appointments, Meal prep and assists with ambulation. .    Medications reconciled and all medications are available in the home this visit. Medications  are effective at this time. Home health supplies by type and quantity ordered/delivered this visit include: All IV supplies in the home. Patient education provided this visit:Patient is a fall risk, went over the need of having someone with him when ambulating, keep hallways and living areas free of clutter and throw rugs. ENCOURAGED PATIENT TO HAVE PROTEIN WITH EACH MEAL TO PROMOTE WOUND HEALING. Discussed step by step IV abx administration with patient. discussed flushing using SASH method, using aseptic technique. with SN instruction, patient performed IV abx administration and port flushing. Each port flushed using SASH method and capped- IV abx infusing with no complications noted. Patient is made aware to monitor for any adverse reactions to abx. no adverse reactions noted. Observe breast wound for any new signs of infection. Progress toward goals: Left Breast is now NIALL with no drainage. Patient managed giving herself her IV medication with me watchng. She did well. Home exercise program: Continue to monitor for signs of infection. Discussed s/s of infection to monitor for, s/s of UTI, who to report to/when. Instructed cg to notify staff/md/seek tx if complications occur. Patient instructed to maintain clear pathways in home and to minimize clutter to prevent falls from occurring/minimize fall potential.   Patient needing a well balanced diet with the 5 food groups, patient to increase fiber in diet, fresh fruits and vegetables, whole grains and increase water intake. Maintain healthy high protein diet. Observe for signs of infection. Work with PT to increase strength and f/u with PCP.     :I went over the importance of taking all prescriptions as ordered. I discussed how to avoid extra sodium in her diet. We discussed the signs of infection and when to call MD.  We discussed the high risk for falls and ways to prevent falls in the future. We also discussed the need of a healthy diet, and the need to change positions frequently. Gaining strength with PT for increased mobility. Continued need for the following skills: Nursing, Physical Therapy and Occupational Therapy    The following discharge planning was discussed with the pt/caregiver:  Will discharge from nursing when education is complete and patient is medically stable and IV PICC has been discontinued by MD.

## 2022-02-28 NOTE — HOME HEALTH
Skilled reason for visit: teach IV pump how to attach and remove, assess ALVARO drain, physical assessment and vital signs    Caregiver involvement: Carla Jasondler, iadls, adls, meal prep, med management, taking to md appointments. Medications reviewed and all medications are available in the home this visit. The following education was provided regarding medications:  discussed step by step IV abx administration with patient. discussed flushing using SASH method, using aseptic technique. with SN instruction, patient performed IV abx administration and port flushing. each port flushed using SASH method and capped- IV abx infusing with no complications noted. patient is made aware to monitor for any adverse reactions to abx. no adverse reactions noted. .    MD notified of any discrepancies/look a-like medications/medication interactions: n/a  Medications are effective  at this time. Home health supplies by type and quantity ordered/delivered this visit include: patient has adequate supplies    Patient education provided this visit:   patient made aware to monitor for s/s of infection [increased swelling, increased redness around site, increased pain, foul smelling drainage, fever] aware who to report to/when.  activity as tolerated, trying to get physical activity 4-5 x weekly, 30 minutes daily. stopping activity if causing shortness of breath or chest pain, dizziness or weakness. patient instructed to follow copd diet- patient needing a well balanced diet with the 5 food groups, patient to increase fiber in diet, fresh fruits and vegetables, whole grains and increase water intake. patient to limit sodium intake, read nutritional labels and monitor sodium content. encouraged patient to get three nutritional meals daily and to stay hydrated, drink plenty of fluids.       Sharps education provided:n/a    Patient level of understanding of education provided: patient able to do return demonstration for iv antibioitics ball    Skilled Care Performed this visit: same as above    Patient response to procedure performed:  patient states that she has no increased pain    Agency Progress toward goals: progressing towards d/c    Patient's Progress towards personal goals: remain free of infections    Home exercise program: PATIENT TO TAKE ALL MEDS AS ORDERED, DO ICS X10/HR WHILE AWAKE, DRINK PLENTY OF FLUIDS,      Continued need for the following skills: Nursing    Plan for next visit: same as above    Patient and/or caregiver notified and agrees to changes in the Plan of Care N/A      The following discharge planning was discussed with the pt/caregiver: Patient will be discharged once education has completed, patient is medically stable and picc line has been d/c'd

## 2022-03-01 ENCOUNTER — HOME CARE VISIT (OUTPATIENT)
Dept: SCHEDULING | Facility: HOME HEALTH | Age: 27
End: 2022-03-01
Payer: MEDICAID

## 2022-03-01 VITALS — RESPIRATION RATE: 16 BRPM | HEART RATE: 92 BPM | OXYGEN SATURATION: 100 %

## 2022-03-01 PROCEDURE — G0151 HHCP-SERV OF PT,EA 15 MIN: HCPCS

## 2022-03-01 PROCEDURE — G0152 HHCP-SERV OF OT,EA 15 MIN: HCPCS

## 2022-03-01 NOTE — HOME HEALTH
PT INITIAL EVALUATION    Ariel Black is a 31 y.o. female with PMH breast cancer status post bilateral mastectomies with implant placement, most recently cholecystitis requiring cholecystectomy on 2/8/2022 presenting w/ syncope and abdominal pain. Patient states that she has not been eating much or drinking much. Patient also states that she had 2 episodes of emesis starting yesterday. She said that she had a syncopal episode yesterday wher e she hit her head and she also had some left thigh pain. Also states having syncopal episode 2/15. She denies any chest pain or palpitation. She states that she is somewhat shortness short of breath on exertion . patient states that about 10 days ago or so she had a gallbladder surgery. Patient states her incision site are not painful unless you press on it hard. Of note patient had mastectomy several months ago. .  Incis ion sites have healed well but she does endorse that about 6 days ago her left breast was swollen and painful to touch. She has not sought out any care for the left breast that is swollen painful. .  She denies any tampon use she states that she has not had her period for several months. Pt states that she is seen by Dr. Kylah Blackwood. Patient states that she was taking some chemotherapy capecitabine 500mg twice a day for 2 week s. She is supposed to start this medication again on 2/23. Past Medical Hx:   Sepsis    L breast abcess    Syncope    Breast cancer    Asthma    Bladder Spasms    Depression     Recent H/o current illness:  31 year old female presents with MD referral for HHPT s/p hospitalization due to left breast tissue expander infection   Medication Management: patient manages own medication  Social hx and home eval:  Pt lives in single story apartment with family close by.    Caregiver Involvement: assist with ADL's, medical appointments  PLOF:  Pt PLOF is ambulation w no AD, pts base level of function independent with all ADL's  BALANCE:     Seated unsupported balance is good   Standing static balance is good  Standing dynamic balance is good  SANDOVAL 56/56  BLE Strength:  Left Hip flexion 4/5 , hip abduction 4/5, hip adduction 4/5, Knee flexion 4/5  knee extension 4/5, ankle dorsiflexion 4/5  Right Hip flexion 4/5 , hip abduction 4/5, hip adduction 4/5, Knee flexion 4/5  knee extension 4/5, ankle dorsiflexion 4/5  BLE ROM:  Right hip/knee/ankle: WFL  Left hip/knee/ankle: WFL  Bed mobility:  independent   Transfers:  independent with sit<->stand for bed to chair, with no AD. no cues and instruction needed for safety and sequencing  GAIT:  Patient ambulated  >300 ft  with no AD  on uneven  surfaces independently. Pt demonstrates with normal hip and knee flexion on BLE in pre and mid swing phase of gait as well as normal stride-length and juan c. Patient is unable to safely ambulate without assistance at this time. Pt required no cues for  safety and sequencing  Stairs: NT  Patient education provided this visit: Safety with functional mobility and instruction with HEP. Patient level of understanding of education provided: good  Patient response to treatment:  tolerated evaluation well  Assessment: Referral for HHPT following recent hospitalization due to eft breast tissue expander infection. HHPT is medically not necessary as patient is at her PLOF. Pt is not a fall risk as indicated by Sandoval score of 52/52. Patient will be seen for HHPT for evaluation only.

## 2022-03-01 NOTE — Clinical Note
Therapy Functional Score Assessment  Question   Score   Grooming  0       Upper Dressing 0      Lower Dressing 0      Bathing  4      Toilet Transfer  0    Transfer  0            Ambulation  0   Dyspnea                     1       Pain Interfering with activity 1  Est number therapy visits      1

## 2022-03-02 VITALS — OXYGEN SATURATION: 100 % | HEART RATE: 92 BPM | TEMPERATURE: 98.1 F

## 2022-03-02 NOTE — HOME HEALTH
Caregiver involvement: Elly Morocho comes daily to visit pt and assist with IV medications. Medications reviewed and all medications are available in the home this visit. The following education was provided regarding medications, medication interactions, and look alike medications (specify): Continue as directed by MD.    Medications  are effective at this time. Patient education provided this visit: see ADL note. Educated pt on use of energy conservation strategies such as sitting during functional tasks, taking frequent rest breaks and keeping frequently used items within reach. Sharps education provided:  na    Patient level of understanding of education provided: Ms. Adrian Hayes is agreeable to consider using a shower seat once cleared to return to showers. She is agreeable to remove throw rug if edges do not flatten out after repositioning to reduce risk of falls and able to provide teach back on energy conservation strategies. Skilled Care Performed this visit: completed OT evaluation and assessment for safety with particiaption of self care and functional mobility in the home setting. Patient response to procedure performed:  Ms. Adrian Hayes had an excellent response to therapy and was agreeable to participate during the assessment. She had no c/o increased dizziness during assessment. Patient's Progress towards personal goals: Ms. Adrian Hayes has excellent potential to return to her independent PLOF. She is currently performing all ADL and mobility independently and without use of AD. Home exercise program: na    Continued need for the following skills: Nursing    Discharge Plans:  1w1 with plans to discharge to self. Ms. Adrian Hayes does not require continued skilled OT as she is independent with all self care and mobility at this time. Pt in agreement with OT discharge. Will notify MD office.     List of Comorbitites:  Asthma        last attack years ago    Cancer [facility])        BREAST    Chronic pain of left thumb      Reason for Admission:    Sepsis ([facility] [address] [A41.9]    Breast abscess [N61.1]    Abscess of left breast [N61.1]         Discharge Diagnoses:    Sepsis    L breast abcess    Syncope    Breast cancer    Asthma    Bladder Spasms    Depression                   Inpatient Notes    HPI:    Fransisca Gardner is a 31 y.o. female with PMH breast cancer status post bilateral mastectomies with implant placement, most recently cholecystitis requiring cholecystectomy on 2/8/2022 presenting w/ syncope and abdominal pain. Patient states that she has not been eating much or drinking much. Patient also states that she had 2 episodes of emesis starting yesterday. She said that she had a syncopal episode yesterday wher e she hit her head and she also had some left thigh pain. Also states having syncopal episode 2/15. She denies any chest pain or palpitation. She states that she is somewhat shortness short of breath on exertion . patient states that about 10 days ago or so she had a gallbladder surgery. Patient states her incision site are not painful unless you press on it hard. Of note patient had mastectomy several months ago. .  Incis ion sites have healed well but she does endorse that about 6 days ago her left breast was swollen and painful to touch. She has not sought out any care for the left breast that is swollen painful. .  She denies any tampon use she states that she has not had her period for several months. Pt states that she is seen by Dr. Eloina Yin. Patient states that she was taking some chemotherapy capecitabine 500mg twice a day for 2 week s. She is supposed to start this medication again on 2/23.

## 2022-03-04 ENCOUNTER — HOME CARE VISIT (OUTPATIENT)
Dept: SCHEDULING | Facility: HOME HEALTH | Age: 27
End: 2022-03-04
Payer: MEDICAID

## 2022-03-04 PROCEDURE — G0299 HHS/HOSPICE OF RN EA 15 MIN: HCPCS

## 2022-03-07 ENCOUNTER — HOME CARE VISIT (OUTPATIENT)
Dept: SCHEDULING | Facility: HOME HEALTH | Age: 27
End: 2022-03-07
Payer: MEDICAID

## 2022-03-07 VITALS
TEMPERATURE: 97.2 F | SYSTOLIC BLOOD PRESSURE: 110 MMHG | RESPIRATION RATE: 16 BRPM | OXYGEN SATURATION: 97 % | HEART RATE: 102 BPM | DIASTOLIC BLOOD PRESSURE: 80 MMHG

## 2022-03-07 PROCEDURE — G0299 HHS/HOSPICE OF RN EA 15 MIN: HCPCS

## 2022-03-08 ENCOUNTER — HOME CARE VISIT (OUTPATIENT)
Dept: HOME HEALTH SERVICES | Facility: HOME HEALTH | Age: 27
End: 2022-03-08
Payer: MEDICAID

## 2022-03-08 VITALS
OXYGEN SATURATION: 97 % | TEMPERATURE: 97 F | DIASTOLIC BLOOD PRESSURE: 80 MMHG | SYSTOLIC BLOOD PRESSURE: 132 MMHG | HEART RATE: 104 BPM | RESPIRATION RATE: 16 BRPM

## 2022-03-08 PROCEDURE — G0299 HHS/HOSPICE OF RN EA 15 MIN: HCPCS

## 2022-03-08 NOTE — HOME HEALTH
Caregiver involvement: family cooks, cleans and takes patient to md appts. Medications reconciled and all medications are available in the home this visit. The following education was provided regarding medications, medication interactions, and look a like medications: medications reconciled with patient. Medications  are effective at this time. iv therapy, patient is independant. Home health supplies by type and quantity ordered/delivered this visit include: na    Patient education provided this visit:    Progress toward goals:good    Home exercise program:breathing exercises to help prevent respiratory problems.     Continued need for the following skills: Nursing    The following discharge planning was discussed with the pt/caregiver:PATIENT WILL BE DISCHARGED ONCE ALL GOALS HAVE BEEN MET AND MEDICALLY STABLE

## 2022-03-09 NOTE — HOME HEALTH
Caregiver involvement: family cooks, cleans and takes patient to md appts. Medications reconciled and all medications are available in the home this visit. The following education was provided regarding medications, medication interactions, and look a like medications: medications reconciled with patient. Medications  are effective at this time. iv therapy, patient is independant. Home health supplies by type and quantity ordered/delivered this visit include: na    Patient education provided this visit: taught patient on s/s of infection at wound site. instructed on medication and dietary compliancy. instructed on when to notify md of medical changes r/t left breast infection. patient is independant with IV therapy. picc line intact and patent. no labs ordered. picc line to be removed dwhen last dose of medication is taken. next md appt. 3-8--22. Progress toward goals:good    Home exercise program:breathing exercises to help prevent respiratory problems.     Continued need for the following skills: Nursing    The following discharge planning was discussed with the pt/caregiver:PATIENT WILL BE DISCHARGED ONCE ALL GOALS HAVE BEEN MET AND MEDICALLY STABLE

## 2022-03-10 VITALS
OXYGEN SATURATION: 97 % | RESPIRATION RATE: 16 BRPM | TEMPERATURE: 97.2 F | DIASTOLIC BLOOD PRESSURE: 80 MMHG | SYSTOLIC BLOOD PRESSURE: 132 MMHG | HEART RATE: 103 BPM

## 2022-03-11 NOTE — HOME HEALTH
Caregiver involvement: family cooks, cleans and takes patient to md appts. Medications reconciled and all medications are available in the home this visit. The following education was provided regarding medications, medication interactions, and look a like medications: medications reconciled with patient. Medications  are effective at this time. iv therapy, patient is independant. Home health supplies by type and quantity ordered/delivered this visit include: na    Patient education provided this visit: taught patient on s/s of infection at wound site. instructed on medication and dietary compliancy. instructed on when to notify md of medical changes r/t left breast infection. patient. picc line removed thia visit. , no problems noted. instructed on when to notify with any medical changes r/t lt breast infection. Progress toward goals:good    Home exercise program:breathing exercises to help prevent respiratory problems. Continued need for the following skills: Nursing    The following discharge planning was discussed with the pt/caregiver:PATIENT WILL BE DISCHARGED this visit.

## 2022-03-18 PROBLEM — K81.9 CHOLECYSTITIS: Status: ACTIVE | Noted: 2022-02-06

## 2022-03-18 PROBLEM — L03.011 FELON OF FINGER OF RIGHT HAND: Status: ACTIVE | Noted: 2021-07-05

## 2022-03-18 PROBLEM — N61.1 ABSCESS OF LEFT BREAST: Status: ACTIVE | Noted: 2022-02-20

## 2022-03-18 PROBLEM — N61.1 BREAST ABSCESS: Status: ACTIVE | Noted: 2022-02-20

## 2022-03-19 PROBLEM — A41.9 SEPSIS (HCC): Status: ACTIVE | Noted: 2022-02-20

## 2022-03-19 PROBLEM — L02.519 CELLULITIS AND ABSCESS OF HAND: Status: ACTIVE | Noted: 2021-07-05

## 2022-03-19 PROBLEM — L03.119 CELLULITIS AND ABSCESS OF HAND: Status: ACTIVE | Noted: 2021-07-05

## 2022-03-19 PROBLEM — C50.919 BREAST CANCER (HCC): Status: ACTIVE | Noted: 2021-08-26

## 2022-04-20 ENCOUNTER — TELEPHONE (OUTPATIENT)
Dept: SURGERY | Age: 27
End: 2022-04-20

## 2022-05-17 ENCOUNTER — OFFICE VISIT (OUTPATIENT)
Dept: SURGERY | Age: 27
End: 2022-05-17
Payer: MEDICAID

## 2022-05-17 VITALS
RESPIRATION RATE: 16 BRPM | HEART RATE: 75 BPM | SYSTOLIC BLOOD PRESSURE: 108 MMHG | TEMPERATURE: 95.6 F | HEIGHT: 61 IN | OXYGEN SATURATION: 100 % | WEIGHT: 225 LBS | DIASTOLIC BLOOD PRESSURE: 60 MMHG | BODY MASS INDEX: 42.48 KG/M2

## 2022-05-17 DIAGNOSIS — K21.9 GASTROESOPHAGEAL REFLUX DISEASE, UNSPECIFIED WHETHER ESOPHAGITIS PRESENT: ICD-10-CM

## 2022-05-17 DIAGNOSIS — Z01.818 PRE-OP TESTING: ICD-10-CM

## 2022-05-17 DIAGNOSIS — E66.01 MORBID OBESITY DUE TO EXCESS CALORIES (HCC): ICD-10-CM

## 2022-05-17 DIAGNOSIS — E66.01 MORBID OBESITY DUE TO EXCESS CALORIES (HCC): Primary | ICD-10-CM

## 2022-05-17 DIAGNOSIS — J45.909 ASTHMA, UNSPECIFIED ASTHMA SEVERITY, UNSPECIFIED WHETHER COMPLICATED, UNSPECIFIED WHETHER PERSISTENT: ICD-10-CM

## 2022-05-17 PROBLEM — Z15.09 BRCA1 GENE MUTATION POSITIVE: Status: ACTIVE | Noted: 2021-05-04

## 2022-05-17 PROBLEM — Z15.01 BRCA1 GENE MUTATION POSITIVE: Status: ACTIVE | Noted: 2021-05-04

## 2022-05-17 PROCEDURE — 99215 OFFICE O/P EST HI 40 MIN: CPT | Performed by: SURGERY

## 2022-05-17 RX ORDER — CAPECITABINE 150 MG/1
1250 TABLET, FILM COATED ORAL
COMMUNITY

## 2022-05-17 NOTE — PROGRESS NOTES
Cindy Rosario is a 28 y.o. female (: 1995) presenting to address:    Chief Complaint   Patient presents with    New Patient     bariatric consult       Medication list and allergies have been reviewed with Cindy Rosario and updated as of today's date. I have gone over all Medical, Surgical and Social History with Cindy Rosario and updated/added the information accordingly.

## 2022-05-17 NOTE — LETTER
5/17/2022    Patient: Jessica Dumont   YOB: 1995   Date of Visit: 5/17/2022     Zuleima Cardenas MD  [address] [facility]  [place]    Dear Zuleima Cardenas MD,      [facility] [address] [facility]. My notes for this consultation are attached. If you have questions, please do not hesitate to call me. I look forward to following your patient along with you.       Sincerely,    James Ferraro MD

## 2022-05-17 NOTE — PROGRESS NOTES
Bariatric Surgery Consultation    CC: Consultation from Dr. Noemí Sy MD regarding surgical treatment options for morbid obesity and related comorbidities  Subjective: The patient is a 28 y.o. obese  female with a Body mass index is 43.22 kg/m². . They have been considering surgery for some time now. The patient presents to the clinic today to discuss surgical weight loss options. They have made multiple attempts at weight loss over the years without success. They have tried low-carb, low calorie, high-protein diets. Unfortunately, none of these have lead to meaningful, sustained weight loss. They have attended the bariatric seminar before the visit. Ongoing cancer treatment for breast cancer (Dr. Callie Apple with [facility]). Scheduled for radiation therapy to conclude June. Ongoing Xeloda treatment. Denies nausea, vomiting, dysphagia  Reports reflux; triggered by spicy foods. 2-3 episodes per week, self-limited without medication    Sleep Apnea assessment:    STOPBANG questionnaire     Do you Snore loudly? yes  Do you often feel Tired, fatigued, or sleepy during the daytime? yes  Has anyone Observed you stop breathing during your sleep? no  Are you being treated for high blood Pressure? no  BMI more than 35 kg/m2? yes  Age over 48years old? no  Neck Circumference >16 inches? no  Gender male? no  ______________________________________     SCORE: 3     If YES to 0 - 2, low risk of sleep apnea  If YES to 3 - 4 intermediate risk of having sleep apnea  If YES to 5 - 8 high risk of having sleep apnea (or 2 + BMI 35 or Neck > 17\" or Male)     Pre op weight: 225  EBW: 103  Goal Weight Calc Women: 142. 6  Wt loss to date: 0     Patient Active Problem List    Diagnosis Date Noted    Morbid obesity due to excess calories ([address] 05/17/2022    Asthma 05/17/2022    Breast abscess 02/20/2022    Sepsis ([facility] 02/20/2022    Abscess of left breast 02/20/2022    Cholecystitis 02/06/2022    Breast cancer [facility] 2021    Felon of finger of right hand 2021    Cellulitis and abscess of hand 2021    BRCA1 gene mutation positive 2021      Past Medical History:   Diagnosis Date    Anemia     Asthma     last attack years ago    Cancer [facility])     BREAST Left    Chronic pain of left thumb      Past Surgical History:   Procedure Laterality Date    HAND/FINGER SURGERY UNLISTED      left thumb    HX BREAST BIOPSY      HX BREAST RECONSTRUCTION Bilateral 2021    BILATERAL BREAST RECONSTRUCTION WITH TISSUE EXPANDERS & ADIPOFASCIAL FLAPS performed by Ruth Unger MD at [address] HX BREAST RECONSTRUCTION Bilateral 3/16/2022    REPLACE BILATERAL BREAST TISSUE EXPANDER WITH PERMANENT IMPLANT performed by Ruth Unger MD at [address] HX  SECTION  2016    HX CYST INCISION AND DRAINAGE Left 2022    REMOVAL OF TISSUE EXPANDER LEFT BREAST, PLACEMENT OF DRAIN performed by Parris Sparks MD at [address] [facility]  [facility] HX MASTECTOMY Bilateral 2021    BILATERAL SKIN SPARING MASTECTOMY; LEFT SENTINEL NODE BIOPSY performed by Valentina Orozco MD at [facility] MAIN OR      Social History     Tobacco Use    Smoking status: Never Smoker    Smokeless tobacco: Never Used   Substance Use Topics    Alcohol use: Yes     Comment: OCASSIONAL      Family History   Problem Relation Age of Onset    Diabetes Mother     No Known Problems Father       Prior to Admission medications    Medication Sig Start Date End Date Taking? Authorizing Provider   capecitabine (XELODA) 150 mg tablet 1,250 mg/m2 two (2) times daily (after meals). Yes Provider, Historical   ergocalciferol (Vitamin D2) 1,250 mcg (50,000 unit) capsule Take 50,000 Units by mouth every seven (7) days. Yes Provider, Historical   escitalopram oxalate (Lexapro) 10 mg tablet Take 10 mg by mouth daily as needed.    Yes Provider, Historical   albuterol (PROVENTIL HFA, VENTOLIN HFA, PROAIR HFA) 90 mcg/actuation inhaler Take 1 Puff by inhalation every four (4) hours as needed for Wheezing. 5/2/18  Yes Lucero Davis MD   OXYBUTYNIN CHLORIDE PO Take 5 mg by mouth daily. Provider, Historical     Allergies   Allergen Reactions    Percocet [Oxycodone-Acetaminophen] Swelling     Pt can not remember if her mom told her  percocet or penicillin allergy so she does not take either. patient reports tylenol makes her eyes swell. States she is not allergic to Percocet. She has never taken it.  Tylenol [Acetaminophen] Anaphylaxis    Dilaudid [Hydromorphone] Hives    Penicillins Other (comments)     Pt stated had allergy as a child as per her mother.  Gadavist [Gadobutrol] Nausea Only and Other (comments)     CO NAUSEA AND HEADACHE POST INJECTION.         Review of Systems:    Constitutional: No fever or chills  Neurologic: No headache  Eyes: No scleral icterus or irritated eyes  Nose: No nasal pain or drainage  Mouth: No oral lesions or sore throat  Cardiac: No palpations or chest pain  Pulmonary: No cough or shortness of breath  Gastrointestinal: No nausea, emesis, diarrhea, or constipation  Genitourinary: No dysuria  Musculoskeletal: No muscle or joint tenderness  Skin: No rashes or lesions  Psychiatric: No anxiety or depressed mood    Pertinent positives: see HPI      Objective:     Physical Exam:  Visit Vitals  /60   Pulse 75   Temp (!) 95.6 °F (35.3 °C)   Resp 16   Ht 5' 0.5\" (1.537 m)   Wt 102.1 kg (225 lb)   SpO2 100%   BMI 43.22 kg/m²     General: No acute distress, conversant  Eyes: PERRLA, no scleral icterus  HENT: Normocephalic without oral lesions  Neck: Trachea midline  Cardiac: Normal pulse rate and rhythm, no edema, capillary refill normal 1 second  Pulmonary: Symmetric chest rise with normal effort, clear to auscultation though mildly obscured by body habitus  GI: Soft, NT, ND, no hernia, no splenomegaly  Skin: Warm without rash  Extremities: No edema or joint stiffness, moves all extremities symmetrically, steady gait  Psych: Appropriate mood and affect    Assessment:     Morbid obesity with comorbidities  Plan:   The patient presents today with severe obesity and obesity related risks/comorbidities as detailed above. The patient has made multiple unsuccessful attempts at weight loss as detailed above. The patient desires surgical weight loss for a better chance of lifelong weight control and control of co-morbidities. They have attended the bariatric seminar and meet the qualifications for surgery based on the NIH criteria. We have discussed the various surgical options including the sleeve gastrectomy, gastric bypass, duodenal switch/modified duodenal switch. We also discussed non operative alternatives. The patient is currently interested in the sleeve gastrectomy. I believe they are a good candidate for this procedure. They will need to a/an UGI, EGD with me to evaluate their anatomy pre operatively. H pylori antigen/breath test ordered as well. We have discussed the possible complications of bariatric surgery which include but are not limited to failed weight loss, weight regain, malnutrition, leak, bleed, stricture, gastric ulcer, gastric fistula, gastric bleed, gallstones, new or worsening gastric reflux, nausea, emesis, internal hernia, abdominal wall hernia, gastric perforation, need for revision / conversion / or reversal, pregnancy complications and loss, intestinal ischemia, post operative skin complications, possible thinning of their hair, bowel obstruction, dumping syndrome, wound infection, blood clots (DVT, mesenteric thrombus, pulmonary embolism), increased addictive tendency, risk of anesthesia, and death. The patient understands this is a life altering decision and will require compliance to the program for the remainder of their life in order to be monitored to avoid complication and ensure successful, sustained weight control.  They will be placed on a lifelong low carbohydrate and low sugar diet, exercise, and vitamin regimen and will require frequent blood draws and office visits to ensure adequate nutrition and program compliance. Visits and follow up will be in compliance with the guidelines set forth by [facility]. I have specifically mentioned the need to avoid all personal and second-hand tobacco exposure, systemic steroids, and NSAIDS after any bariatric surgery to help avoid the above listed complications. The patient has expressed understanding of the above and would like to enroll in the program. The patient will be submitted for medical and psychological clearance along with establishing with out dietician and joining the pre / post operative support group. They will be screened for depression and sleep apnea and treated pre operatively if needed. After successful completion of the preoperative regimen the patient will be submitted for insurance approval and pending this will be scheduled for surgery. Tests/clearances ordered:  CBC, CMP, A1c, H pylori, Vitamin D, CXR, EKG, UGI, EGD with me  Nutrition, support group, PCP clearance, psychological clearance, ONCOLOGIST CLEARANCE (Dr. Viviana S[facility] at [facility])       All questions from the patient have been answered and they have demonstrated appropriate understanding of the process.       Signed By: Farheen Rose MD [facility]  Bariatric and General Surgeon  [facility]    May 17, 2022

## 2022-05-18 ENCOUNTER — TELEPHONE (OUTPATIENT)
Dept: SURGERY | Age: 27
End: 2022-05-18

## 2022-05-18 NOTE — TELEPHONE ENCOUNTER
Patient called, she would like to know what the oncologist said about her being cleared for surgery. She called them this morning and they stated that they talked to Dr. Selina Cohn. She would like a phone call to discuss.

## 2022-05-19 NOTE — TELEPHONE ENCOUNTER
5/19/2022    I called and I pass the message from Dr. Christina Barrett. The patient is not to start the program for the following reason: \"Yesterday I saw patient Zaynab Moseley. She was quite unclear about her breast cancer therapy course, so I reached out to her oncologist, Dr. Guillermina Hastings. She let me know that Ms. Thony De La Rosa is still actively under treatment for breast cancer, with oral chemotherapy and radiation scheduled, and there is also a plan for surgical removal of her uterus and ovaries due to having a cancer gene mutation. In addition, Ms. Thony De La Rosa has struggled with compliance with her cancer therapy. As such, she is not clear to start the program yet. I will notify her that Dr. Celestine Rosario and her team will send us documentation showing her compliance and completion of her cancer therapy and then we can start her in our program.     Let me know if there are any questions. \"    [facility]

## 2022-05-20 LAB — UREA BREATH TEST QL: NEGATIVE

## 2022-07-26 ENCOUNTER — TELEPHONE (OUTPATIENT)
Dept: SURGERY | Age: 27
End: 2022-07-26

## 2022-07-26 NOTE — TELEPHONE ENCOUNTER
Patient called stating that she spoke with np for /oncology and was told it was ok for her to start the liquid diet. I advised patient usually liquid diet is started couple days before actual surgery date. I asked patient was she a;ready scheduled for surgery and patient stated she has not started anything on the check list yet. Patient states Dr. Elvira Amaro spoke with oncologist and was told she could not start the WLT but patient states she can start liquid diet. I offered patient an appt to discuss situation with Dr Elvira Amaro or NP. Patient states will wait for a call from Dr Elvira Amaro or nurse. Patient states ok to call [phone] at any time.

## 2022-08-01 NOTE — TELEPHONE ENCOUNTER
Spoke to the patient advised per Dr. Abdifatah Turk that at this time she needs to maintain the current weight until after she completes the oncology treatment. Patient states understanding.

## 2022-08-24 ENCOUNTER — TELEPHONE (OUTPATIENT)
Dept: SURGERY | Age: 27
End: 2022-08-24

## 2022-08-24 NOTE — TELEPHONE ENCOUNTER
Patient called and left name, number and date of birth on nurse line. She did not leave a message. I called patient back and it went to her voicemail. I left message to please call office at earliest convenience.

## 2023-01-31 RX ORDER — ESCITALOPRAM OXALATE 10 MG/1
10 TABLET ORAL DAILY PRN
COMMUNITY

## 2023-01-31 RX ORDER — ERGOCALCIFEROL 1.25 MG/1
50000 CAPSULE ORAL
COMMUNITY

## 2023-01-31 RX ORDER — ALBUTEROL SULFATE 90 UG/1
1 AEROSOL, METERED RESPIRATORY (INHALATION) EVERY 4 HOURS PRN
COMMUNITY
Start: 2018-05-02

## 2023-01-31 RX ORDER — CAPECITABINE 150 MG/1
1250 TABLET, FILM COATED ORAL
COMMUNITY

## 2024-08-14 NOTE — LETTER
Continue medication as below  Return in clinic in 2 months for possible lipid panel    NOTIFICATION RETURN TO WORK / SCHOOL 
 
2/11/2019 8:17 PM 
 
Ms. Amber Aly [address] [place] [id number] To Whom It May Concern: 
 
Amber Aly is currently under the care of [facility] EMERGENCY DEPT. She will return to work/school on: 2/13/19. If there are questions or concerns please have the patient contact our office.  
 
 
 
Sincerely, 
 
 
Juliet Zhu PA-C

## 2024-08-26 NOTE — PROGRESS NOTES
IB[facility] Steven Grijalva M.D. FACS  PROGRESS NOTE    Name: Brennan Dickinson MRN: [id number]   : 1995 [facility]   Date: 2022 Admission Date: 2022  5:00 AM     Hospital Day: 2     Subjective:  Patient without acute overnight events. Objective:  Vitals:    22 1847 22 1943 22 2325 22 0443   BP: 105/66 110/71 (!) 144/87 (!) 95/55   Pulse: 98 90 93 91   Resp:    Temp: 98.5 °F (36.9 °C) 98.2 °F (36.8 °C) 98.6 °F (37 °C) 98.9 °F (37.2 °C)   SpO2: 100% 99% 100% 98%   Weight:       Height:         Date 22 0700 - 22 0659 22 0700 - 22 0659   Shift 2628-3860 2057-0392 24 Hour Total 9414-6635 0789-2339 24 Hour Total   INTAKE   Shift Total(mL/kg)         OUTPUT   Urine(mL/kg/hr)           Urine Occurrence(s)  5 x 5 x      Shift Total(mL/kg)         NET         Weight (kg) 101.6 101.6 101.6 101.6 101.6 101.6         Physical Exam:    General: Awake and alert, oriented x4, no apparent distress   Abdomen: abdomen is soft with minimal right upper quadrant tenderness.   No masses, organomegaly or guarding   Extremities: No c/c/e BLE  Labs:  Recent Results (from the past 24 hour(s))   POC LACTIC ACID    Collection Time: 22  6:35 AM   Result Value Ref Range    Lactic Acid (POC) 3.13 (HH) 0.40 - 2.00 mmol/L   COVID-19 RAPID TEST    Collection Time: 22 11:37 AM   Result Value Ref Range    Specimen source Nasopharyngeal      COVID-19 rapid test Not detected NOTD     LACTIC ACID    Collection Time: 22 11:50 AM   Result Value Ref Range    Lactic acid 3.9 (HH) 0.4 - 2.0 MMOL/L   GLUCOSE, POC    Collection Time: 22  6:01 PM   Result Value Ref Range    Glucose (POC) 198 (H) 70 - 274 mg/dL   METABOLIC PANEL, BASIC    Collection Time: 22  5:18 AM   Result Value Ref Range    Sodium 144 136 - 145 mmol/L    Potassium PENDING mmol/L    Chloride 112 (H) 100 - 111 mmol/L    CO2 25 21 - 32 mmol/L    Anion gap 7 3.0 - 18 mmol/L Glucose 172 (H) 74 - 99 mg/dL    BUN 3 (L) 7.0 - 18 MG/DL    Creatinine 0.91 0.6 - 1.3 MG/DL    BUN/Creatinine ratio 3 (L) 12 - 20      GFR est AA >60 >60 ml/min/1.73m2    GFR est non-AA >60 >60 ml/min/1.73m2    Calcium 8.0 (L) 8.5 - 10.1 MG/DL   HEPATIC FUNCTION PANEL    Collection Time: 02/07/22  5:18 AM   Result Value Ref Range    Protein, total PENDING g/dL    Albumin 2.1 (L) 3.4 - 5.0 g/dL    Globulin PENDING g/dL    A-G Ratio PENDING      Bilirubin, total PENDING MG/DL    Bilirubin, direct 1.0 (H) 0.0 - 0.2 MG/DL    Alk. phosphatase PENDING U/L    AST (SGOT) PENDING U/L    ALT (SGPT) PENDING U/L   LIPASE    Collection Time: 02/07/22  5:18 AM   Result Value Ref Range    Lipase 53 (L) 73 - 393 U/L   CBC WITH AUTOMATED DIFF    Collection Time: 02/07/22  5:18 AM   Result Value Ref Range    WBC 1.9 (L) 4.6 - 13.2 K/uL    RBC 3.23 (L) 4.20 - 5.30 M/uL    HGB 8.5 (L) 12.0 - 16.0 g/dL    HCT 26.8 (L) 35.0 - 45.0 %    MCV 83.0 78.0 - 100.0 FL    MCH 26.3 24.0 - 34.0 PG    MCHC 31.7 31.0 - 37.0 g/dL    RDW 20.4 (H) 11.6 - 14.5 %    PLATELET 961 606 - 778 K/uL    MPV 9.2 9.2 - 11.8 FL    NRBC 1.6 (H) 0  WBC    ABSOLUTE NRBC 0.03 (H) 0.00 - 0.01 K/uL    NEUTROPHILS PENDING %    LYMPHOCYTES PENDING %    MONOCYTES PENDING %    EOSINOPHILS PENDING %    BASOPHILS PENDING %    IMMATURE GRANULOCYTES PENDING %    ABS. NEUTROPHILS PENDING K/UL    ABS. LYMPHOCYTES PENDING K/UL    ABS. MONOCYTES PENDING K/UL    ABS. EOSINOPHILS PENDING K/UL    ABS. BASOPHILS PENDING K/UL    ABS. IMM. GRANS.  PENDING K/UL    DF PENDING      All Micro Results     Procedure Component Value Units Date/Time    COVID-19 RAPID TEST [[id number]] Collected: 02/06/22 1137    Order Status: Completed Specimen: Nasopharyngeal Updated: 02/06/22 1225     Specimen source Nasopharyngeal        COVID-19 rapid test Not detected        Comment: Rapid Abbott ID Now       Rapid NAAT:  The specimen is NEGATIVE for SARS-CoV-2, the novel coronavirus associated with COVID-19. Negative results should be treated as presumptive and, if inconsistent with clinical signs and symptoms or necessary for patient management, should be tested with an alternative molecular assay. Negative results do not preclude SARS-CoV-2 infection and should not be used as the sole basis for patient management decisions. This test has been authorized by the FDA under an Emergency Use Authorization (EUA) for use by authorized laboratories.    Fact sheet for Healthcare Providers: [facility]  Fact sheet for Patients: [employer]       Methodology: Isothermal Nucleic Acid Amplification         CULTURE, BLOOD [[id number]] Collected: 02/06/22 0610    Order Status: Completed Specimen: Blood Updated: 02/06/22 0744    CULTURE, BLOOD [[id number]] Collected: 02/06/22 0620    Order Status: Completed Specimen: Blood Updated: 02/06/22 0744          Current Medications:  Current Facility-Administered Medications   Medication Dose Route Frequency Provider Last Rate Last Admin    sodium chloride (NS) flush 5-40 mL  5-40 mL IntraVENous Q8H Candice Euceda MD   10 mL at 02/06/22 1616    sodium chloride (NS) flush 5-40 mL  5-40 mL IntraVENous PRN Candice Euceda MD        dextrose 5% and 0.9% NaCl infusion  125 mL/hr IntraVENous CONTINUOUS Candice Euceda  mL/hr at 02/07/22 0011 125 mL/hr at 02/07/22 0011    naloxone (NARCAN) injection 0.4 mg  0.4 mg IntraVENous PRN Candice Euceda MD        morphine injection 4 mg  4 mg IntraVENous Q2H PRN Candice Euceda MD        [facility]ron [facility]) injection 4 mg  4 mg IntraVENous Q4H PRN Candice Euceda MD        diphenhydrAMINE (BENADRYL) injection 12.5 mg  12.5 mg IntraVENous Q4H PRN Candice Euceda MD        bisacodyL (DULCOLAX) suppository 10 mg  10 mg Rectal DAILY PRN Candice Euceda MD        LORazepam (ATIVAN) injection 1 mg  1 mg IntraVENous Q6H PRN Clement Bhat MD        enoxaparin (LOVENOX) injection 40 mg  40 mg SubCUTAneous DAILY Clement Bhat MD   40 mg at 02/06/22 1450    sodium chloride (NS) flush 5-40 mL  5-40 mL IntraVENous Q8H Hosie Model, CRNA   10 mL at 02/06/22 1440    sodium chloride (NS) flush 5-40 mL  5-40 mL IntraVENous PRN Hosie Model, CRNA        levoFLOXacin (LEVAQUIN) 500 mg in D5W IVPB  500 mg IntraVENous Q24H Clement Bhat MD        metroNIDAZOLE (FLAGYL) IVPB premix 500 mg  500 mg IntraVENous Q12H Clement Bhat  mL/hr at 02/06/22 2028 500 mg at 02/06/22 2028       Chart and notes reviewed. Data reviewed. I have evaluated and examined the patient. IMPRESSION:   · Patient with acute calculus cholecystitis.       PLAN:/DISCUSION:   · Robot-assisted laparoscopic cholecystectomy with firefly today  · Consent on chart  · Preoperative orders written        Ashlee Pickens MD

## (undated) DEVICE — MASTISOL ADHESIVE LIQ 2/3ML

## (undated) DEVICE — SUTURE PERMA-HAND SZ 3-0 L18IN NONABSORBABLE BLK L24MM PS-1 1684G

## (undated) DEVICE — ARM DRAPE

## (undated) DEVICE — DRESSING,GAUZE,XEROFORM,CURAD,1"X8",ST: Brand: CURAD

## (undated) DEVICE — SEAL UNIV 5-8MM DISP BX/10 -- DA VINCI XI - SNGL USE

## (undated) DEVICE — BLADE ASSEMB CLP HAIR FINE --

## (undated) DEVICE — SWAB CULT LIQ STUART AGR AERB MOD IN BRK SGL RAYON TIP PLAS 220099] BECTON DICKINSON MICRO]

## (undated) DEVICE — GLOVE SURG SZ 8 L11.77IN FNGR THK9.8MIL STRW LTX POLYMER

## (undated) DEVICE — INTENDED FOR TISSUE SEPARATION, AND OTHER PROCEDURES THAT REQUIRE A SHARP SURGICAL BLADE TO PUNCTURE OR CUT.: Brand: BARD-PARKER SAFETY BLADES SIZE 10, STERILE

## (undated) DEVICE — DRAIN SURG 15FR L3/16IN DIA4.7MM SIL CHN RND HUBLESS FULL

## (undated) DEVICE — SOLUTION IV 1000ML 0.9% SOD CHL

## (undated) DEVICE — PACK PROCEDURE SURG MAJ W/ BASIN LF

## (undated) DEVICE — GAUZE PKG STRP IODOFRM 1/4X5YD --

## (undated) DEVICE — PREP SKN CHLRAPRP APL 26ML STR --

## (undated) DEVICE — COVER LT HNDL BLU STRL -- MEDICHOICE

## (undated) DEVICE — REM POLYHESIVE ADULT PATIENT RETURN ELECTRODE: Brand: VALLEYLAB

## (undated) DEVICE — STERILE POLYISOPRENE POWDER-FREE SURGICAL GLOVES: Brand: PROTEXIS

## (undated) DEVICE — COVER MPLR TIP CRV SCIS ACC DA VINCI

## (undated) DEVICE — LIGHT HANDLE: Brand: DEVON

## (undated) DEVICE — GLOVE SURG BIOGEL 8.0 STRL -- SKINSENSE

## (undated) DEVICE — CULTURETTE SGL EVAC TUBE PALL -- 100/CA

## (undated) DEVICE — COLUMN DRAPE

## (undated) DEVICE — KIT CLN UP BON SECOURS MARYV

## (undated) DEVICE — SPONGE LAP 18X18IN STRL -- 5/PK

## (undated) DEVICE — 3M™ TEGADERM™ TRANSPARENT FILM DRESSING FRAME STYLE, 1626W, 4 IN X 4-3/4 IN (10 CM X 12 CM), 50/CT 4CT/CASE: Brand: 3M™ TEGADERM™

## (undated) DEVICE — SYR 10ML LUER LOK 1/5ML GRAD --

## (undated) DEVICE — ELECTRO LUBE IS A SINGLE PATIENT USE DEVICE THAT IS INTENDED TO BE USED ON ELECTROSURGICAL ELECTRODES TO REDUCE STICKING.: Brand: KEY SURGICAL ELECTRO LUBE

## (undated) DEVICE — BANDAGE,GAUZE,BULKEE II,4.5"X4.1YD,STRL: Brand: MEDLINE

## (undated) DEVICE — GAUZE,SPONGE,4"X4",16PLY,STRL,LF,10/TRAY: Brand: MEDLINE

## (undated) DEVICE — 4-PORT MANIFOLD: Brand: NEPTUNE 2

## (undated) DEVICE — TOURNIQUET PHLEB L EXSANGUINATING FOR AD DGT TOURNI-COT

## (undated) DEVICE — CLIP INT M L POLYMER LOK LIG HEM O LOK

## (undated) DEVICE — Device

## (undated) DEVICE — THREE-QUARTER SHEET: Brand: CONVERTORS

## (undated) DEVICE — DRSG PATCH ANTIMIC 1INX7.0MM -- CONVERT TO ITEM 356054

## (undated) DEVICE — DRAPE,HAND,STERILE: Brand: MEDLINE

## (undated) DEVICE — SUTURE VCRL SZ 0 L18IN ABSRB UD POLYGLACTIN 910 BRAID TIE J912G

## (undated) DEVICE — AIRSEAL BIFURCATED SMOKE EVAC FILTERED TUBE SET: Brand: AIRSEAL

## (undated) DEVICE — BANDAGE COBAN 4 IN COMPR W4INXL5YD FOAM COHESIVE QUIK STK SELF ADH SFT

## (undated) DEVICE — PAD,ABDOMINAL,8"X10",ST,LF: Brand: MEDLINE

## (undated) DEVICE — NEEDLE HYPO 25GA L1.5IN BVL ORIENTED ECLIPSE

## (undated) DEVICE — SUTURE MCRYL SZ 4-0 L27IN ABSRB UD L24MM PS-1 3/8 CIR PRIM Y935H

## (undated) DEVICE — GOWN,SIRUS,POLYRNF,SETINSLV,L,20/CS: Brand: MEDLINE

## (undated) DEVICE — INTENDED FOR TISSUE SEPARATION, AND OTHER PROCEDURES THAT REQUIRE A SHARP SURGICAL BLADE TO PUNCTURE OR CUT.: Brand: BARD-PARKER ®  SAFETY SCALPED

## (undated) DEVICE — MEDI-VAC NON-CONDUCTIVE SUCTION TUBING: Brand: CARDINAL HEALTH

## (undated) DEVICE — TISSUE RETRIEVAL SYSTEM: Brand: INZII RETRIEVAL SYSTEM

## (undated) DEVICE — 3 ML SYRINGE WITH HYPODERMIC SAFETY NEEDLE: Brand: MAGELLAN

## (undated) DEVICE — INTENDED FOR TISSUE SEPARATION, AND OTHER PROCEDURES THAT REQUIRE A SHARP SURGICAL BLADE TO PUNCTURE OR CUT.: Brand: BARD-PARKER ® CARBON RIB-BACK BLADES

## (undated) DEVICE — STERILE LATEX POWDER-FREE SURGICAL GLOVESWITH NITRILE COATING: Brand: PROTEXIS

## (undated) DEVICE — SHEET, DRAPE, SPLIT, STERILE: Brand: MEDLINE

## (undated) DEVICE — MAYO STAND COVER: Brand: CONVERTORS

## (undated) DEVICE — DRAPE TOWEL: Brand: CONVERTORS

## (undated) DEVICE — GARMENT,MEDLINE,DVT,INT,CALF,MED, GEN2: Brand: MEDLINE

## (undated) DEVICE — SYSTEM EVAC SMOKE LAPARSCOPIC

## (undated) DEVICE — BLANKET WRM AD W50XL85.8IN PACU FULL BODY FORC AIR

## (undated) DEVICE — BANDAGE ADH W0.75XL3IN UNIV WVN FAB NAT GEN USE STRP N ADH

## (undated) DEVICE — LAPAROSCOPIC TROCAR SLEEVE/SINGLE USE: Brand: KII® OPTICAL ACCESS SYSTEM

## (undated) DEVICE — PREP SKN CHLRAPRP APPL 10.5ML --

## (undated) DEVICE — ADHESIVE TISS CLOSURE 22X4 CM 4 CC HI VISC EXOFIN

## (undated) DEVICE — GOWN,SIRUS,POLYRNF,SETINSLV,XL,20/CS: Brand: MEDLINE

## (undated) DEVICE — GLOVE SURG SZ 65 CRM LTX FREE POLYISOPRENE POLYMER BEAD ANTI